# Patient Record
Sex: FEMALE | Race: BLACK OR AFRICAN AMERICAN | Employment: OTHER | URBAN - METROPOLITAN AREA
[De-identification: names, ages, dates, MRNs, and addresses within clinical notes are randomized per-mention and may not be internally consistent; named-entity substitution may affect disease eponyms.]

---

## 2020-07-22 ENCOUNTER — HOSPITAL ENCOUNTER (INPATIENT)
Age: 65
LOS: 2 days | Discharge: HOME | DRG: 066 | End: 2020-07-24
Attending: EMERGENCY MEDICINE | Admitting: HOSPITALIST
Payer: MEDICARE

## 2020-07-22 ENCOUNTER — APPOINTMENT (OUTPATIENT)
Dept: CT IMAGING | Age: 65
DRG: 066 | End: 2020-07-22
Attending: EMERGENCY MEDICINE
Payer: MEDICARE

## 2020-07-22 ENCOUNTER — APPOINTMENT (OUTPATIENT)
Dept: MRI IMAGING | Age: 65
DRG: 066 | End: 2020-07-22
Attending: EMERGENCY MEDICINE
Payer: MEDICARE

## 2020-07-22 DIAGNOSIS — E78.2 MIXED HYPERLIPIDEMIA: ICD-10-CM

## 2020-07-22 DIAGNOSIS — M54.30 SCIATICA: ICD-10-CM

## 2020-07-22 DIAGNOSIS — E11.9 TYPE 2 DIABETES MELLITUS WITHOUT COMPLICATION, WITHOUT LONG-TERM CURRENT USE OF INSULIN (HCC): ICD-10-CM

## 2020-07-22 DIAGNOSIS — M54.2 NECK PAIN: Primary | ICD-10-CM

## 2020-07-22 DIAGNOSIS — I63.511 CEREBROVASCULAR ACCIDENT (CVA) DUE TO STENOSIS OF RIGHT MIDDLE CEREBRAL ARTERY (HCC): ICD-10-CM

## 2020-07-22 DIAGNOSIS — I63.9 CEREBROVASCULAR ACCIDENT (CVA), UNSPECIFIED MECHANISM (HCC): ICD-10-CM

## 2020-07-22 DIAGNOSIS — I10 ESSENTIAL HYPERTENSION, BENIGN: ICD-10-CM

## 2020-07-22 DIAGNOSIS — R20.2 PARESTHESIAS IN LEFT HAND: ICD-10-CM

## 2020-07-22 LAB
ALBUMIN SERPL-MCNC: 3.5 G/DL (ref 3.5–5)
ALBUMIN/GLOB SERPL: 0.7 {RATIO} (ref 1.1–2.2)
ALP SERPL-CCNC: 84 U/L (ref 45–117)
ALT SERPL-CCNC: 14 U/L (ref 12–78)
ANION GAP SERPL CALC-SCNC: 6 MMOL/L (ref 5–15)
AST SERPL-CCNC: 9 U/L (ref 15–37)
BASOPHILS # BLD: 0.1 K/UL (ref 0–0.1)
BASOPHILS NFR BLD: 1 % (ref 0–1)
BILIRUB SERPL-MCNC: 0.5 MG/DL (ref 0.2–1)
BUN SERPL-MCNC: 14 MG/DL (ref 6–20)
BUN/CREAT SERPL: 16 (ref 12–20)
CALCIUM SERPL-MCNC: 9.5 MG/DL (ref 8.5–10.1)
CHLORIDE SERPL-SCNC: 100 MMOL/L (ref 97–108)
CO2 SERPL-SCNC: 30 MMOL/L (ref 21–32)
CREAT SERPL-MCNC: 0.9 MG/DL (ref 0.55–1.02)
DIFFERENTIAL METHOD BLD: NORMAL
EOSINOPHIL # BLD: 0 K/UL (ref 0–0.4)
EOSINOPHIL NFR BLD: 0 % (ref 0–7)
ERYTHROCYTE [DISTWIDTH] IN BLOOD BY AUTOMATED COUNT: 13 % (ref 11.5–14.5)
GLOBULIN SER CALC-MCNC: 4.8 G/DL (ref 2–4)
GLUCOSE BLD STRIP.AUTO-MCNC: 115 MG/DL (ref 65–100)
GLUCOSE BLD STRIP.AUTO-MCNC: 178 MG/DL (ref 65–100)
GLUCOSE SERPL-MCNC: 111 MG/DL (ref 65–100)
HCT VFR BLD AUTO: 42.7 % (ref 35–47)
HGB BLD-MCNC: 14 G/DL (ref 11.5–16)
IMM GRANULOCYTES # BLD AUTO: 0 K/UL (ref 0–0.04)
IMM GRANULOCYTES NFR BLD AUTO: 0 % (ref 0–0.5)
INR PPP: 1 (ref 0.9–1.1)
LYMPHOCYTES # BLD: 2.7 K/UL (ref 0.8–3.5)
LYMPHOCYTES NFR BLD: 29 % (ref 12–49)
MCH RBC QN AUTO: 29.7 PG (ref 26–34)
MCHC RBC AUTO-ENTMCNC: 32.8 G/DL (ref 30–36.5)
MCV RBC AUTO: 90.5 FL (ref 80–99)
MONOCYTES # BLD: 0.9 K/UL (ref 0–1)
MONOCYTES NFR BLD: 10 % (ref 5–13)
NEUTS SEG # BLD: 5.7 K/UL (ref 1.8–8)
NEUTS SEG NFR BLD: 60 % (ref 32–75)
NRBC # BLD: 0 K/UL (ref 0–0.01)
NRBC BLD-RTO: 0 PER 100 WBC
PLATELET # BLD AUTO: 384 K/UL (ref 150–400)
PMV BLD AUTO: 10 FL (ref 8.9–12.9)
POTASSIUM SERPL-SCNC: 3.4 MMOL/L (ref 3.5–5.1)
PROT SERPL-MCNC: 8.3 G/DL (ref 6.4–8.2)
PROTHROMBIN TIME: 10.9 SEC (ref 9–11.1)
RBC # BLD AUTO: 4.72 M/UL (ref 3.8–5.2)
SERVICE CMNT-IMP: ABNORMAL
SERVICE CMNT-IMP: ABNORMAL
SODIUM SERPL-SCNC: 136 MMOL/L (ref 136–145)
TROPONIN I SERPL-MCNC: <0.05 NG/ML
WBC # BLD AUTO: 9.5 K/UL (ref 3.6–11)

## 2020-07-22 PROCEDURE — 74011250636 HC RX REV CODE- 250/636: Performed by: INTERNAL MEDICINE

## 2020-07-22 PROCEDURE — 96374 THER/PROPH/DIAG INJ IV PUSH: CPT

## 2020-07-22 PROCEDURE — 85025 COMPLETE CBC W/AUTO DIFF WBC: CPT

## 2020-07-22 PROCEDURE — 74011250636 HC RX REV CODE- 250/636: Performed by: EMERGENCY MEDICINE

## 2020-07-22 PROCEDURE — 36415 COLL VENOUS BLD VENIPUNCTURE: CPT

## 2020-07-22 PROCEDURE — 74011636320 HC RX REV CODE- 636/320: Performed by: EMERGENCY MEDICINE

## 2020-07-22 PROCEDURE — 99285 EMERGENCY DEPT VISIT HI MDM: CPT

## 2020-07-22 PROCEDURE — 84484 ASSAY OF TROPONIN QUANT: CPT

## 2020-07-22 PROCEDURE — 82962 GLUCOSE BLOOD TEST: CPT

## 2020-07-22 PROCEDURE — 93005 ELECTROCARDIOGRAM TRACING: CPT

## 2020-07-22 PROCEDURE — 80053 COMPREHEN METABOLIC PANEL: CPT

## 2020-07-22 PROCEDURE — 70496 CT ANGIOGRAPHY HEAD: CPT

## 2020-07-22 PROCEDURE — 74011250637 HC RX REV CODE- 250/637: Performed by: INTERNAL MEDICINE

## 2020-07-22 PROCEDURE — 74011250637 HC RX REV CODE- 250/637: Performed by: EMERGENCY MEDICINE

## 2020-07-22 PROCEDURE — 70551 MRI BRAIN STEM W/O DYE: CPT

## 2020-07-22 PROCEDURE — 85610 PROTHROMBIN TIME: CPT

## 2020-07-22 PROCEDURE — 70450 CT HEAD/BRAIN W/O DYE: CPT

## 2020-07-22 PROCEDURE — 65660000000 HC RM CCU STEPDOWN

## 2020-07-22 RX ORDER — ACETAMINOPHEN 325 MG/1
650 TABLET ORAL
Status: DISCONTINUED | OUTPATIENT
Start: 2020-07-22 | End: 2020-07-23

## 2020-07-22 RX ORDER — CYCLOBENZAPRINE HCL 10 MG
10 TABLET ORAL
Qty: 30 TAB | Refills: 0 | Status: SHIPPED | OUTPATIENT
Start: 2020-07-22 | End: 2020-07-22

## 2020-07-22 RX ORDER — SODIUM CHLORIDE 0.9 % (FLUSH) 0.9 %
10 SYRINGE (ML) INJECTION
Status: COMPLETED | OUTPATIENT
Start: 2020-07-22 | End: 2020-07-22

## 2020-07-22 RX ORDER — ACETAMINOPHEN 650 MG/1
650 SUPPOSITORY RECTAL
Status: DISCONTINUED | OUTPATIENT
Start: 2020-07-22 | End: 2020-07-24 | Stop reason: HOSPADM

## 2020-07-22 RX ORDER — ENOXAPARIN SODIUM 100 MG/ML
40 INJECTION SUBCUTANEOUS EVERY 24 HOURS
Status: DISCONTINUED | OUTPATIENT
Start: 2020-07-22 | End: 2020-07-24 | Stop reason: HOSPADM

## 2020-07-22 RX ORDER — MAGNESIUM SULFATE 100 %
4 CRYSTALS MISCELLANEOUS AS NEEDED
Status: DISCONTINUED | OUTPATIENT
Start: 2020-07-22 | End: 2020-07-24 | Stop reason: HOSPADM

## 2020-07-22 RX ORDER — ONDANSETRON 2 MG/ML
4 INJECTION INTRAMUSCULAR; INTRAVENOUS
Status: DISCONTINUED | OUTPATIENT
Start: 2020-07-22 | End: 2020-07-24 | Stop reason: HOSPADM

## 2020-07-22 RX ORDER — ACETAMINOPHEN 500 MG
1000 TABLET ORAL ONCE
Status: COMPLETED | OUTPATIENT
Start: 2020-07-22 | End: 2020-07-22

## 2020-07-22 RX ORDER — DIAZEPAM 10 MG/2ML
1 INJECTION INTRAMUSCULAR
Status: COMPLETED | OUTPATIENT
Start: 2020-07-22 | End: 2020-07-22

## 2020-07-22 RX ORDER — AMLODIPINE BESYLATE 5 MG/1
10 TABLET ORAL DAILY
Status: DISCONTINUED | OUTPATIENT
Start: 2020-07-23 | End: 2020-07-24 | Stop reason: HOSPADM

## 2020-07-22 RX ORDER — LOSARTAN POTASSIUM 100 MG/1
100 TABLET ORAL DAILY
COMMUNITY

## 2020-07-22 RX ORDER — HYDRALAZINE HYDROCHLORIDE 20 MG/ML
10 INJECTION INTRAMUSCULAR; INTRAVENOUS
Status: DISCONTINUED | OUTPATIENT
Start: 2020-07-22 | End: 2020-07-24 | Stop reason: HOSPADM

## 2020-07-22 RX ORDER — ALPRAZOLAM 0.5 MG/1
1 TABLET ORAL
Status: DISCONTINUED | OUTPATIENT
Start: 2020-07-22 | End: 2020-07-24 | Stop reason: HOSPADM

## 2020-07-22 RX ORDER — LOSARTAN POTASSIUM 50 MG/1
100 TABLET ORAL DAILY
Status: DISCONTINUED | OUTPATIENT
Start: 2020-07-23 | End: 2020-07-22

## 2020-07-22 RX ORDER — ASPIRIN 325 MG
325 TABLET ORAL ONCE
Status: COMPLETED | OUTPATIENT
Start: 2020-07-22 | End: 2020-07-22

## 2020-07-22 RX ORDER — LOSARTAN POTASSIUM 50 MG/1
100 TABLET ORAL DAILY
Status: DISCONTINUED | OUTPATIENT
Start: 2020-07-22 | End: 2020-07-24 | Stop reason: HOSPADM

## 2020-07-22 RX ORDER — GUAIFENESIN 100 MG/5ML
81 LIQUID (ML) ORAL DAILY
Status: DISCONTINUED | OUTPATIENT
Start: 2020-07-23 | End: 2020-07-24 | Stop reason: HOSPADM

## 2020-07-22 RX ORDER — ACETAMINOPHEN 325 MG/1
650 TABLET ORAL
Status: DISCONTINUED | OUTPATIENT
Start: 2020-07-23 | End: 2020-07-24 | Stop reason: HOSPADM

## 2020-07-22 RX ORDER — BACLOFEN 10 MG/1
5 TABLET ORAL 2 TIMES DAILY
COMMUNITY

## 2020-07-22 RX ORDER — ACETAMINOPHEN 325 MG/1
650 TABLET ORAL
Status: DISCONTINUED | OUTPATIENT
Start: 2020-07-22 | End: 2020-07-24 | Stop reason: HOSPADM

## 2020-07-22 RX ORDER — ATORVASTATIN CALCIUM 40 MG/1
40 TABLET, FILM COATED ORAL
Status: DISCONTINUED | OUTPATIENT
Start: 2020-07-22 | End: 2020-07-24 | Stop reason: HOSPADM

## 2020-07-22 RX ORDER — DEXTROSE 50 % IN WATER (D50W) INTRAVENOUS SYRINGE
12.5-25 AS NEEDED
Status: DISCONTINUED | OUTPATIENT
Start: 2020-07-22 | End: 2020-07-24 | Stop reason: HOSPADM

## 2020-07-22 RX ORDER — POLYETHYLENE GLYCOL 3350 17 G/17G
17 POWDER, FOR SOLUTION ORAL DAILY
Status: DISCONTINUED | OUTPATIENT
Start: 2020-07-23 | End: 2020-07-24 | Stop reason: HOSPADM

## 2020-07-22 RX ORDER — CYCLOBENZAPRINE HCL 10 MG
5 TABLET ORAL
Status: DISCONTINUED | OUTPATIENT
Start: 2020-07-22 | End: 2020-07-24 | Stop reason: HOSPADM

## 2020-07-22 RX ORDER — INSULIN LISPRO 100 [IU]/ML
INJECTION, SOLUTION INTRAVENOUS; SUBCUTANEOUS
Status: DISCONTINUED | OUTPATIENT
Start: 2020-07-22 | End: 2020-07-24 | Stop reason: HOSPADM

## 2020-07-22 RX ORDER — METFORMIN HYDROCHLORIDE 1000 MG/1
1000 TABLET ORAL 2 TIMES DAILY WITH MEALS
COMMUNITY

## 2020-07-22 RX ADMIN — ENOXAPARIN SODIUM 40 MG: 40 INJECTION SUBCUTANEOUS at 21:41

## 2020-07-22 RX ADMIN — ASPIRIN 325 MG: 325 TABLET, FILM COATED ORAL at 14:27

## 2020-07-22 RX ADMIN — ATORVASTATIN CALCIUM 40 MG: 40 TABLET, FILM COATED ORAL at 21:41

## 2020-07-22 RX ADMIN — ACETAMINOPHEN 650 MG: 325 TABLET ORAL at 17:52

## 2020-07-22 RX ADMIN — Medication 10 ML: at 11:20

## 2020-07-22 RX ADMIN — IOPAMIDOL 100 ML: 755 INJECTION, SOLUTION INTRAVENOUS at 11:20

## 2020-07-22 RX ADMIN — ONDANSETRON 4 MG: 2 INJECTION INTRAMUSCULAR; INTRAVENOUS at 17:21

## 2020-07-22 RX ADMIN — ACETAMINOPHEN 1000 MG: 500 TABLET ORAL at 12:03

## 2020-07-22 RX ADMIN — LOSARTAN POTASSIUM 100 MG: 50 TABLET ORAL at 21:41

## 2020-07-22 RX ADMIN — ALPRAZOLAM 1 MG: 0.5 TABLET ORAL at 21:41

## 2020-07-22 RX ADMIN — DIAZEPAM 1 MG: 5 INJECTION, SOLUTION INTRAMUSCULAR; INTRAVENOUS at 12:04

## 2020-07-22 NOTE — ACP (ADVANCE CARE PLANNING)
Advance Care Planning Note    Name: Johan Walter  YOB: 1955  MRN: [id number]  Admission Date: 7/22/2020 10:23 AM    Date of discussion: 7/22/2020    Active Diagnoses:    Hospital Problems  Date Reviewed: 9/22/2015          Codes Class Noted POA    Stroke (cerebrum) [facility]) ICD-10-CM: I63.9  ICD-9-CM: 434.91  7/22/2020 Unknown                These active diagnoses are of sufficient risk that focused discussion on advance care planning is indicated in order to allow the patient to thoughtfully consider personal goals of care, and if situations arise that prevent the ability to personally give input, to ensure appropriate representation of their personal desires for different levels and aggressiveness of care. Persons present and participating in discussion: Patient Nu Bloom who is AAOx4 and her daughter Thaddeus Smith     Discussion: Code status addressed due to above mentioned medical problems and also her chronic comorbidity /advance age. Patient and MPOA wants her  to be a Full Code. Patient  would like to assign her daughter Thaddeus Smith as the surrogate decision maker. Time Spent:   Total time spent face-to-face in education and discussion:16 minutes.      Kristie Lyon MD  7/22/2020  3:52 PM

## 2020-07-22 NOTE — ED NOTES
Pt in from home with family. Pt reports left hand pain/numbness, headache/earache and neck pain and stiffness since Sunday. Pt states she feels like her left hand is asleep and tingling. Pt states her speech became slurred for several minutes on Sunday and her PCP told her to make an appointment for an ultrasound. Pt reports 6/10 pain at this time and is monitored x2.

## 2020-07-22 NOTE — ED PROVIDER NOTES
EMERGENCY DEPARTMENT HISTORY AND PHYSICAL EXAM          Date: 7/22/2020  Patient Name: Vivek Bentley    History of Presenting Illness     Chief Complaint   Patient presents with    Neck Pain     Ambulatory into the ED with c/o nausea, non-traumatic neck pain, and Lt hand numbness (denies numbness in her arm).  Hand Pain    Nausea       History Provided By: Patient    HPI: Vivek Bentley is a 71 y.o. female, pmhx high cholesterol, DM, fibromyalgia, depression, arthritis and HTN, who presents ambulatory with family to the ED c/o neck pain     Patient notes she started last night with right sided neck pain radiating up into her ear and numbness to her left hand with has progressively worsened through the day. Currently her pain is rated at a 5-6/10; she has taken her routine meds today but nothing extra for the pain. She also notes that she feels nauseated with the pain. Patient specifically denies any recent fevers, chills, vomiting, diarrhea, abd pain, CP, SOB, urinary sxs, changes in BM, or headache. PCP: Ozzie Espinoza MD    Allergies: Percocet, dilaudid and codeine cause nausea  Social Hx: occasional tobacco, -vaping, +EtOH, -Illicit Drugs; Lives with children    There are no other complaints, changes, or physical findings at this time. Current Facility-Administered Medications   Medication Dose Route Frequency Provider Last Rate Last Dose    aspirin tablet 325 mg  325 mg Oral ONCE JuditheeJazmin jones MD         Current Outpatient Medications   Medication Sig Dispense Refill    cyclobenzaprine (FLEXERIL) 10 mg tablet Take 1 Tab by mouth three (3) times daily as needed for Muscle Spasm(s). 30 Tab 0    escitalopram oxalate (LEXAPRO) 10 mg tablet Take 1 Tab by mouth daily. Indications: ANXIETY WITH DEPRESSION 90 Tab 0    ALPRAZolam (XANAX) 1 mg tablet Take 1 Tab by mouth two (2) times daily as needed for Anxiety. Max Daily Amount: 2 mg.  Indications: ANXIETY WITH DEPRESSION 30 Tab 0  amLODIPine (NORVASC) 10 mg tablet Take 1 Tab by mouth daily. 90 Tab 1    atorvastatin (LIPITOR) 10 mg tablet TAKE ONE TABLET BY MOUTH ONCE DAILY 90 Tab 1    Nebulizer Accessories kit Use as directed. 1 Kit 0    glimepiride (AMARYL) 4 mg tablet TAKE ONE TABLET BY MOUTH IN THE MORNING 90 Tab 3    ondansetron (ZOFRAN ODT) 4 mg disintegrating tablet Take 1 Tab by mouth every eight (8) hours as needed for Nausea. 16 Tab 1    metoprolol succinate (TOPROL-XL) 25 mg XL tablet Take 1 Tab by mouth daily. 30 Tab 5    glucose blood VI test strips (ASCENSIA AUTODISC VI, ONE TOUCH ULTRA TEST VI) strip Test blood sugar three time daily. 90 Each 2    acetaminophen (TYLENOL EXTRA STRENGTH) 500 mg tablet Take  by mouth every six (6) hours as needed for Pain.  fluticasone-salmeterol (ADVAIR) 100-50 mcg/dose diskus inhaler Take 1 Puff by inhalation two (2) times a day. 1 Inhaler 2    fluticasone (FLONASE) 50 mcg/actuation nasal spray 2 Sprays by Both Nostrils route daily. 1 Bottle 2    lisinopril-hydrochlorothiazide (PRINZIDE, ZESTORETIC) 20-12.5 mg per tablet Take 2 Tabs by mouth daily. 180 Tab 1    aspirin 81 mg chewable tablet Take 81 mg by mouth daily.  albuterol (PROAIR HFA) 90 mcg/actuation inhaler Take 1 puff by inhalation every four (4) hours as needed for Wheezing or Shortness of Breath. 1 Inhaler 2    loratadine (CLARITIN) 10 mg tablet Take 10 mg by mouth daily.  celecoxib (CELEBREX) 200 mg capsule Take 1 capsule by mouth two (2) times daily as needed for Pain. 180 capsule 1    cholecalciferol, vitamin D3, (VITAMIN D3) 2,000 unit tab Take  by mouth daily.  0.9 % SODIUM CHLORIDE (NASAL MIST NA) by Nasal route.  albuterol (PROVENTIL VENTOLIN) 2.5 mg /3 mL (0.083 %) nebulizer solution 3 mL by Nebulization route every four (4) hours as needed for Wheezing. 1 Package 4    epinephrine (EPIPEN) 0.3 mg/0.3 mL (1:1,000) injection 0.3 mL by IntraMUSCular route once as needed.  1 each 1    zolpidem CR (AMBIEN CR) 12.5 mg tablet Take 1 tablet by mouth nightly as needed. 90 tablet 1       Past History     Past Medical History:  Past Medical History:   Diagnosis Date    Arthritis     Breast cyst     Depressive disorder, not elsewhere classified 6/17/2013    Fibromyalgia 6/17/2013    Hypertension     Menopause     Mixed hyperlipidemia 6/17/2013    Type II or unspecified type diabetes mellitus without mention of complication, not stated as uncontrolled 6/17/2013    Unspecified vitamin D deficiency 6/17/2013       Past Surgical History:  Past Surgical History:   Procedure Laterality Date    ABDOMEN SURGERY PROC UNLISTED  6/2013    Colon resection    HX BREAST BIOPSY Left     cyst removal    HX CHOLECYSTECTOMY      HX COLONOSCOPY  7/28/14    HX HYSTERECTOMY      HX MOHS PROCEDURES      HX OOPHORECTOMY      HX OTHER SURGICAL      cyst removed from spine       Family History:  Family History   Problem Relation Age of Onset    Diabetes Mother     Hypertension Mother     Kidney Disease Mother     Diabetes Father     Heart Disease Father     Hypertension Sister     Stroke Sister     Heart Disease Sister     Hypertension Sister     Hypertension Brother     Hypertension Sister        Social History:  Social History     Tobacco Use    Smoking status: Former Smoker    Tobacco comment: quit 2010   Substance Use Topics    Alcohol use: No    Drug use: Not on file       Allergies: Allergies   Allergen Reactions    Bees [Hymenoptera Allergenic Extract] Nausea Only    Menthol Chest Rub [Camph-Eucalypt-Men-Turp-Pet] Rash    Percocet [Oxycodone-Acetaminophen] Unknown (comments)         Review of Systems   Review of Systems   Constitutional: Negative for activity change, appetite change, chills, fever and unexpected weight change. HENT: Negative for congestion. Eyes: Negative for pain and visual disturbance. Respiratory: Negative for cough and shortness of breath.     Cardiovascular: Negative for chest pain. Gastrointestinal: Negative for abdominal pain, diarrhea, nausea and vomiting. Genitourinary: Negative for dysuria. Musculoskeletal: Positive for gait problem (walks with a cane at baseline), neck pain and neck stiffness. Negative for back pain. Skin: Negative for rash. Neurological: Positive for numbness. Negative for headaches. Physical Exam   Physical Exam  Vitals signs and nursing note reviewed. Constitutional:       Appearance: She is well-developed. She is not diaphoretic. Comments: Depressed appearing female with flattened voice currently with elevated blood pressure in mild-mod distress due to pain. HENT:      Head: Normocephalic and atraumatic. Eyes:      General:         Right eye: No discharge. Left eye: No discharge. Conjunctiva/sclera: Conjunctivae normal.      Pupils: Pupils are equal, round, and reactive to light. Neck:      Musculoskeletal: Normal range of motion and neck supple. Cardiovascular:      Rate and Rhythm: Normal rate and regular rhythm. Heart sounds: Normal heart sounds. No murmur. Pulmonary:      Effort: Pulmonary effort is normal. No respiratory distress. Breath sounds: Normal breath sounds. No stridor. No wheezing, rhonchi or rales. Abdominal:      General: Bowel sounds are normal. There is no distension. Palpations: Abdomen is soft. Tenderness: There is no abdominal tenderness. There is no guarding or rebound. Musculoskeletal: Normal range of motion. Right lower leg: No edema. Left lower leg: No edema. Skin:     General: Skin is warm and dry. Findings: No rash. Neurological:      Mental Status: She is alert and oriented to person, place, and time. Cranial Nerves: No cranial nerve deficit. Motor: No weakness or abnormal muscle tone. Comments: Normal finger-to-nose; negative pronator drift.        Diagnostic Study Results     Labs -     Recent Results (from the past 12 hour(s))   GLUCOSE, POC    Collection Time: 07/22/20 10:58 AM   Result Value Ref Range    Glucose (POC) 115 (H) 65 - 100 mg/dL    Performed by Reid Smith    CBC WITH AUTOMATED DIFF    Collection Time: 07/22/20 10:59 AM   Result Value Ref Range    WBC 9.5 3.6 - 11.0 K/uL    RBC 4.72 3.80 - 5.20 M/uL    HGB 14.0 11.5 - 16.0 g/dL    HCT 42.7 35.0 - 47.0 %    MCV 90.5 80.0 - 99.0 FL    MCH 29.7 26.0 - 34.0 PG    MCHC 32.8 30.0 - 36.5 g/dL    RDW 13.0 11.5 - 14.5 %    PLATELET 723 473 - 093 K/uL    MPV 10.0 8.9 - 12.9 FL    NRBC 0.0 0  WBC    ABSOLUTE NRBC 0.00 0.00 - 0.01 K/uL    NEUTROPHILS 60 32 - 75 %    LYMPHOCYTES 29 12 - 49 %    MONOCYTES 10 5 - 13 %    EOSINOPHILS 0 0 - 7 %    BASOPHILS 1 0 - 1 %    IMMATURE GRANULOCYTES 0 0.0 - 0.5 %    ABS. NEUTROPHILS 5.7 1.8 - 8.0 K/UL    ABS. LYMPHOCYTES 2.7 0.8 - 3.5 K/UL    ABS. MONOCYTES 0.9 0.0 - 1.0 K/UL    ABS. EOSINOPHILS 0.0 0.0 - 0.4 K/UL    ABS. BASOPHILS 0.1 0.0 - 0.1 K/UL    ABS. IMM. GRANS. 0.0 0.00 - 0.04 K/UL    DF AUTOMATED     METABOLIC PANEL, COMPREHENSIVE    Collection Time: 07/22/20 10:59 AM   Result Value Ref Range    Sodium 136 136 - 145 mmol/L    Potassium 3.4 (L) 3.5 - 5.1 mmol/L    Chloride 100 97 - 108 mmol/L    CO2 30 21 - 32 mmol/L    Anion gap 6 5 - 15 mmol/L    Glucose 111 (H) 65 - 100 mg/dL    BUN 14 6 - 20 MG/DL    Creatinine 0.90 0.55 - 1.02 MG/DL    BUN/Creatinine ratio 16 12 - 20      GFR est AA >60 >60 ml/min/1.73m2    GFR est non-AA >60 >60 ml/min/1.73m2    Calcium 9.5 8.5 - 10.1 MG/DL    Bilirubin, total 0.5 0.2 - 1.0 MG/DL    ALT (SGPT) 14 12 - 78 U/L    AST (SGOT) 9 (L) 15 - 37 U/L    Alk.  phosphatase 84 45 - 117 U/L    Protein, total 8.3 (H) 6.4 - 8.2 g/dL    Albumin 3.5 3.5 - 5.0 g/dL    Globulin 4.8 (H) 2.0 - 4.0 g/dL    A-G Ratio 0.7 (L) 1.1 - 2.2     PROTHROMBIN TIME + INR    Collection Time: 07/22/20 10:59 AM   Result Value Ref Range    INR 1.0 0.9 - 1.1      Prothrombin time 10.9 9.0 - 11.1 sec TROPONIN I    Collection Time: 07/22/20 10:59 AM   Result Value Ref Range    Troponin-I, Qt. <0.05 <0.05 ng/mL   EKG, 12 LEAD, INITIAL    Collection Time: 07/22/20 11:05 AM   Result Value Ref Range    Ventricular Rate 78 BPM    Atrial Rate 78 BPM    P-R Interval 166 ms    QRS Duration 78 ms    Q-T Interval 386 ms    QTC Calculation (Bezet) 440 ms    Calculated P Axis 40 degrees    Calculated R Axis -3 degrees    Calculated T Axis 16 degrees    Diagnosis       Normal sinus rhythm  Possible Left atrial enlargement  Left ventricular hypertrophy  No previous ECGs available         Radiologic Studies -   MRI BRAIN WO CONT   Final Result   IMPRESSION:       1. Multiple small acute infarcts in the right frontoparietal white matter. 2. Moderate chronic microvascular ischemic disease. Numerous chronic   microhemorrhages in the bilateral thalami, and few additional scattered   supratentorial and infratentorial chronic microhemorrhages as above. 23X            CT HEAD WO CONT   Final Result   IMPRESSION:   1. No evidence of acute intracranial abnormality. 2. Moderate chronic microvascular ischemic disease. CTA HEAD NECK W CONT   Final Result   IMPRESSION:       CTA Head:   1. No evidence of flow-limiting stenosis. 2. Multifocal atherosclerotic disease as above, including severe stenosis of the   left P2 segment, moderate stenosis of the distal right M1 segment and moderate   stenosis of the bilateral M2 segments. 3. A 2 mm right posterior communicating artery infundibulum versus aneurysm. CTA Neck:   1. No evidence of flow-limiting stenosis. 2. Mild stenosis (less than 50% by NASCET criteria) of the proximal left   internal carotid artery. CT Results  (Last 48 hours)               07/22/20 1116  CT HEAD WO CONT Final result    Impression:  IMPRESSION:   1. No evidence of acute intracranial abnormality. 2. Moderate chronic microvascular ischemic disease.                Narrative: EXAM:  CT HEAD WO CONT       INDICATION:   left arm paresthesia with weakness since Sunday       COMPARISON: None. TECHNIQUE: Unenhanced CT of the head was performed using 5 mm images. Brain and   bone windows were generated. CT dose reduction was achieved through use of a   standardized protocol tailored for this examination and automatic exposure   control for dose modulation. FINDINGS:   The ventricles are normal in size and position. Scattered periventricular and   deep white matter hypodensities, confluent in regions, consistent with moderate   chronic microangiopathic ischemic changes. Basilar cisterns are patent. No   midline shift. There is no evidence of acute infarct, hemorrhage, or extraaxial   fluid collection. Mild mucosal thickening in the bilateral ethmoidal air cells. The mastoid air   cells and middle ears are clear. The orbital contents are within normal limits. There are no significant osseous or extracranial soft tissue lesions. 07/22/20 1116  CTA HEAD NECK W CONT Final result    Impression:  IMPRESSION:        CTA Head:   1. No evidence of flow-limiting stenosis. 2. Multifocal atherosclerotic disease as above, including severe stenosis of the   left P2 segment, moderate stenosis of the distal right M1 segment and moderate   stenosis of the bilateral M2 segments. 3. A 2 mm right posterior communicating artery infundibulum versus aneurysm. CTA Neck:   1. No evidence of flow-limiting stenosis. 2. Mild stenosis (less than 50% by NASCET criteria) of the proximal left   internal carotid artery. Narrative:  EXAM:  CTA HEAD NECK W CONT       INDICATION:   rt neck pain with left arm paresthesias       COMPARISON:  CT head 7/22/2020. CONTRAST:  100 mL of Isovue-370. TECHNIQUE:  Unenhanced  images were obtained to localize the volume for   acquisition.   Multislice helical axial CT angiography was performed from the   aortic arch to the top of the head during uneventful rapid bolus intravenous   contrast administration. Coronal and sagittal reformations and 3D post   processing was performed. CT dose reduction was achieved through use of a   standardized protocol tailored for this examination and automatic exposure   control for dose modulation. FINDINGS:       CTA Head:   There is no evidence of large vessel occlusion or flow-limiting stenosis of the   intracranial internal carotid, anterior cerebral, and middle cerebral arteries. Calcific atherosclerosis of the bilateral carotid siphons with mild multifocal   stenosis on the right. Moderate stenosis of the distal right M1 segment and   moderate stenosis of the proximal right M2 segments. Moderate stenosis of the   proximal left M2 posterior division. The anterior communicating artery is   patent. There is no evidence of large vessel occlusion or flow-limiting stenosis of the   intracranial vertebral arteries, basilar artery, or posterior cerebral arteries. There is moderate stenosis of the distal right P2 segment. There is severe   stenosis in the mid left P2 segment. Mild stenosis of the proximal basilar   artery. The left posterior communicating artery is patent with an infundibulum   at its origin. There is a small 2 mm infundibulum versus aneurysm of the right posterior   communicating artery. The dural venous sinuses and deep cerebral venous system   are patent. No evidence of abnormal enhancement on delayed phase images. CTA NECK:   NASCET method was utilized for calculating stenosis. The aortic arch is unremarkable. The common carotid arteries demonstrate no   significant stenosis. Mild calcific atherosclerosis of the right carotid   bifurcation without significant stenosis. Eccentric calcified plaque in the   proximal left internal carotid artery with mild (less than 50%) stenosis.    Retropharyngeal course of the proximal bilateral internal carotid arteries. There is a codominant vertebrobasilar arterial system. The cervical vertebral   arteries are normal in course, size and contour without significant stenosis. Visualized soft tissues of the neck are unremarkable. Visualized lung apices are   clear. No acute fracture or aggressive osseous lesion. Mild degenerative disc   disease at C3-C4. CXR Results  (Last 48 hours)    None            Medical Decision Making   I am the first provider for this patient. I reviewed the vital signs, available nursing notes, past medical history, past surgical history, family history and social history. Vital Signs-Reviewed the patient's vital signs. Patient Vitals for the past 12 hrs:   Temp Pulse Resp BP SpO2   07/22/20 1230    153/86 99 %   07/22/20 1100    (!) 156/97 100 %   07/22/20 1022 98.2 °F (36.8 °C) (!) 103 17 (!) 159/108 100 %       Pulse Oximetry Analysis - 99% on RA    Cardiac Monitor:   Rate: 95bpm  Rhythm: Normal Sinus Rhythm      Records Reviewed: Nursing Notes, Old Medical Records, Previous Radiology Studies and Previous Laboratory Studies    Provider Notes (Medical Decision Making):   MDM: Middle-aged female presenting with right neck tension and left hand paresthesias. Patient states symptoms started yesterday evening but according to the family she has been complaining of pain since Sunday. Neurologic exam without any acute significant deficit, vascular exam without bruit. Will initiate nonacute evaluation given the time. .    ED Course:   Initial assessment performed. The patients presenting problems have been discussed, and they are in agreement with the care plan formulated and outlined with them. I have encouraged them to ask questions as they arise throughout their visit.     EKG interpretation: (Preliminary)  Rhythm: Normal sinus rhythm at a regular rate of 78 bpm; normal NJ; normal QRS; normal QTC; left axis deviation; T wave flattening noted in lead III.  This EKG was interpreted by ED Provider Marisol Rosario MD    11:30 AM   Spent 3 minutes discussing the risks of smoking and the benefits of smoking cessation as well as the long term sequelae of smoking with the pt who verbalized her understanding. Reviewed strategies for success, including gradually decreasing the number of cigarettes smoked a day. PROGRESS NOTE:    ED Course as of Jul 22 1423 Wed Jul 22, 2020   1259 Patient states she is feeling better. Had a long discussion with patient and her daughter regarding her CT and CT angios results. Recommend MRI of the brain to which patient and family agree. [JT]      ED Course User Index  [JT] Esthela Murray MD      2:23 PM   Patient sleeping but awakens easily. Her blood pressure remained stable. MRI shows multiple acute infarcts. Call placed through the hospitalist pager for admission. CONSULT NOTE:   2:34 PM   Marisol Rosario MD spoke with Dr. Noreen Perez,   Specialty: Hospitalist  Discussed pt's hx, disposition, and available diagnostic and imaging results. Reviewed care plans. Consultant agrees with plans as outlined. She will evaluate the patient for admission. Critical Care Time:   0      Diagnosis     Clinical Impression:   1. Neck pain    2. Paresthesias in left hand    3. Sciatica    4. Cerebrovascular accident (CVA), unspecified mechanism ([address]        PLAN:  1. Admission to the hospital for further treatment and evaluation      Please note, this dictation was completed with [employer], the computer voice recognition software. Quite often unanticipated grammatical, syntax, homophones, and other interpretive errors are inadvertently transcribed by the computer software. Please disregard these errors. Please excuse any errors that have escaped final proof reading.

## 2020-07-22 NOTE — PROGRESS NOTES

## 2020-07-23 ENCOUNTER — APPOINTMENT (OUTPATIENT)
Dept: NON INVASIVE DIAGNOSTICS | Age: 65
DRG: 066 | End: 2020-07-23
Attending: INTERNAL MEDICINE
Payer: MEDICARE

## 2020-07-23 LAB
ANION GAP SERPL CALC-SCNC: 7 MMOL/L (ref 5–15)
ATRIAL RATE: 78 BPM
BUN SERPL-MCNC: 14 MG/DL (ref 6–20)
BUN/CREAT SERPL: 21 (ref 12–20)
CALCIUM SERPL-MCNC: 9.1 MG/DL (ref 8.5–10.1)
CALCULATED P AXIS, ECG09: 40 DEGREES
CALCULATED R AXIS, ECG10: -3 DEGREES
CALCULATED T AXIS, ECG11: 16 DEGREES
CHLORIDE SERPL-SCNC: 101 MMOL/L (ref 97–108)
CHOLEST SERPL-MCNC: 170 MG/DL
CO2 SERPL-SCNC: 27 MMOL/L (ref 21–32)
CREAT SERPL-MCNC: 0.66 MG/DL (ref 0.55–1.02)
DIAGNOSIS, 93000: NORMAL
ECHO AV MEAN GRADIENT: 1.87 MMHG
ECHO AV PEAK GRADIENT: 4.91 MMHG
ECHO AV PEAK GRADIENT: 4.91 MMHG
ECHO AV PEAK VELOCITY: 110.82 CM/S
ECHO AV PEAK VELOCITY: 110.82 CM/S
ECHO AV VTI: 20.37 CM
ECHO LA AREA 4C: 21.63 CM2
ECHO LA VOL 4C: 67.52 ML (ref 22–52)
ECHO LA VOLUME INDEX A4C: 40.74 ML/M2 (ref 16–28)
ECHO LV E' LATERAL VELOCITY: 6.37 CM/S
ECHO LV E' SEPTAL VELOCITY: 5.45 CM/S
ECHO LV EDV A4C: 86.67 ML
ECHO LV EDV INDEX A4C: 52.3 ML/M2
ECHO LV EJECTION FRACTION A4C: 36 PERCENT
ECHO LV ESV A4C: 55.87 ML
ECHO LV ESV INDEX A4C: 33.7 ML/M2
ECHO LVOT PEAK GRADIENT: 4.09 MMHG
ECHO LVOT PEAK GRADIENT: 4.29 MMHG
ECHO LVOT PEAK VELOCITY: 101.16 CM/S
ECHO LVOT PEAK VELOCITY: 103.6 CM/S
ECHO LVOT VTI: 17.11 CM
ECHO MV A VELOCITY: 74.11 CM/S
ECHO MV AREA PHT: 4.18 CM2
ECHO MV E DECELERATION TIME (DT): 0.18 S
ECHO MV E VELOCITY: 72.28 CM/S
ECHO MV E/A RATIO: 0.98
ECHO MV E/E' LATERAL: 11.35
ECHO MV E/E' RATIO (AVERAGED): 12.3
ECHO MV E/E' SEPTAL: 13.26
ECHO MV MAX VELOCITY: 108.63 CM/S
ECHO MV MEAN GRADIENT: 1.57 MMHG
ECHO MV PEAK GRADIENT: 4.72 MMHG
ECHO MV PRESSURE HALF TIME (PHT): 0.05 S
ECHO MV VTI: 21.26 CM
ERYTHROCYTE [DISTWIDTH] IN BLOOD BY AUTOMATED COUNT: 12.8 % (ref 11.5–14.5)
EST. AVERAGE GLUCOSE BLD GHB EST-MCNC: 154 MG/DL
GLUCOSE BLD STRIP.AUTO-MCNC: 106 MG/DL (ref 65–100)
GLUCOSE BLD STRIP.AUTO-MCNC: 131 MG/DL (ref 65–100)
GLUCOSE BLD STRIP.AUTO-MCNC: 167 MG/DL (ref 65–100)
GLUCOSE BLD STRIP.AUTO-MCNC: 172 MG/DL (ref 65–100)
GLUCOSE BLD STRIP.AUTO-MCNC: 180 MG/DL (ref 65–100)
GLUCOSE SERPL-MCNC: 107 MG/DL (ref 65–100)
HBA1C MFR BLD: 7 % (ref 4–5.6)
HCT VFR BLD AUTO: 41.2 % (ref 35–47)
HDLC SERPL-MCNC: 49 MG/DL
HDLC SERPL: 3.5 {RATIO} (ref 0–5)
HGB BLD-MCNC: 13.5 G/DL (ref 11.5–16)
LDLC SERPL CALC-MCNC: 92.2 MG/DL (ref 0–100)
LIPID PROFILE,FLP: NORMAL
LVOT MG: 1.98 MMHG
MCH RBC QN AUTO: 29.8 PG (ref 26–34)
MCHC RBC AUTO-ENTMCNC: 32.8 G/DL (ref 30–36.5)
MCV RBC AUTO: 90.9 FL (ref 80–99)
NRBC # BLD: 0 K/UL (ref 0–0.01)
NRBC BLD-RTO: 0 PER 100 WBC
P-R INTERVAL, ECG05: 166 MS
PLATELET # BLD AUTO: 371 K/UL (ref 150–400)
PMV BLD AUTO: 9.8 FL (ref 8.9–12.9)
POTASSIUM SERPL-SCNC: 3.4 MMOL/L (ref 3.5–5.1)
Q-T INTERVAL, ECG07: 386 MS
QRS DURATION, ECG06: 78 MS
QTC CALCULATION (BEZET), ECG08: 440 MS
RBC # BLD AUTO: 4.53 M/UL (ref 3.8–5.2)
SERVICE CMNT-IMP: ABNORMAL
SODIUM SERPL-SCNC: 135 MMOL/L (ref 136–145)
TRIGL SERPL-MCNC: 144 MG/DL (ref ?–150)
TSH SERPL DL<=0.05 MIU/L-ACNC: 1.69 UIU/ML (ref 0.36–3.74)
VENTRICULAR RATE, ECG03: 78 BPM
VLDLC SERPL CALC-MCNC: 28.8 MG/DL
WBC # BLD AUTO: 8 K/UL (ref 3.6–11)

## 2020-07-23 PROCEDURE — 92610 EVALUATE SWALLOWING FUNCTION: CPT

## 2020-07-23 PROCEDURE — 74011250636 HC RX REV CODE- 250/636: Performed by: INTERNAL MEDICINE

## 2020-07-23 PROCEDURE — 83036 HEMOGLOBIN GLYCOSYLATED A1C: CPT

## 2020-07-23 PROCEDURE — 74011250637 HC RX REV CODE- 250/637: Performed by: PSYCHIATRY & NEUROLOGY

## 2020-07-23 PROCEDURE — 80061 LIPID PANEL: CPT

## 2020-07-23 PROCEDURE — 82962 GLUCOSE BLOOD TEST: CPT

## 2020-07-23 PROCEDURE — 36415 COLL VENOUS BLD VENIPUNCTURE: CPT

## 2020-07-23 PROCEDURE — 74011250637 HC RX REV CODE- 250/637: Performed by: INTERNAL MEDICINE

## 2020-07-23 PROCEDURE — 93306 TTE W/DOPPLER COMPLETE: CPT

## 2020-07-23 PROCEDURE — 97162 PT EVAL MOD COMPLEX 30 MIN: CPT

## 2020-07-23 PROCEDURE — 97165 OT EVAL LOW COMPLEX 30 MIN: CPT

## 2020-07-23 PROCEDURE — 74011250636 HC RX REV CODE- 250/636: Performed by: EMERGENCY MEDICINE

## 2020-07-23 PROCEDURE — 65660000000 HC RM CCU STEPDOWN

## 2020-07-23 PROCEDURE — 97116 GAIT TRAINING THERAPY: CPT

## 2020-07-23 PROCEDURE — 85027 COMPLETE CBC AUTOMATED: CPT

## 2020-07-23 PROCEDURE — 84443 ASSAY THYROID STIM HORMONE: CPT

## 2020-07-23 PROCEDURE — 80048 BASIC METABOLIC PNL TOTAL CA: CPT

## 2020-07-23 PROCEDURE — 74011636637 HC RX REV CODE- 636/637: Performed by: INTERNAL MEDICINE

## 2020-07-23 PROCEDURE — 97535 SELF CARE MNGMENT TRAINING: CPT

## 2020-07-23 RX ORDER — ACETAMINOPHEN 500 MG
1000 TABLET ORAL
Status: DISCONTINUED | OUTPATIENT
Start: 2020-07-23 | End: 2020-07-24 | Stop reason: HOSPADM

## 2020-07-23 RX ORDER — METOPROLOL TARTRATE 25 MG/1
12.5 TABLET, FILM COATED ORAL EVERY 12 HOURS
Status: DISCONTINUED | OUTPATIENT
Start: 2020-07-23 | End: 2020-07-24 | Stop reason: HOSPADM

## 2020-07-23 RX ORDER — LANOLIN ALCOHOL/MO/W.PET/CERES
3 CREAM (GRAM) TOPICAL
Status: DISCONTINUED | OUTPATIENT
Start: 2020-07-23 | End: 2020-07-24 | Stop reason: HOSPADM

## 2020-07-23 RX ORDER — BACLOFEN 10 MG/1
5 TABLET ORAL
Status: DISCONTINUED | OUTPATIENT
Start: 2020-07-23 | End: 2020-07-24 | Stop reason: HOSPADM

## 2020-07-23 RX ORDER — CLOPIDOGREL BISULFATE 75 MG/1
75 TABLET ORAL DAILY
Status: DISCONTINUED | OUTPATIENT
Start: 2020-07-23 | End: 2020-07-24 | Stop reason: HOSPADM

## 2020-07-23 RX ORDER — LOSARTAN POTASSIUM 50 MG/1
100 TABLET ORAL DAILY
Status: DISCONTINUED | OUTPATIENT
Start: 2020-07-24 | End: 2020-07-23

## 2020-07-23 RX ORDER — POTASSIUM CHLORIDE 750 MG/1
40 TABLET, FILM COATED, EXTENDED RELEASE ORAL ONCE
Status: COMPLETED | OUTPATIENT
Start: 2020-07-23 | End: 2020-07-23

## 2020-07-23 RX ORDER — TRAMADOL HYDROCHLORIDE 50 MG/1
50 TABLET ORAL
Status: DISCONTINUED | OUTPATIENT
Start: 2020-07-23 | End: 2020-07-24 | Stop reason: HOSPADM

## 2020-07-23 RX ADMIN — ATORVASTATIN CALCIUM 40 MG: 40 TABLET, FILM COATED ORAL at 21:46

## 2020-07-23 RX ADMIN — POLYETHYLENE GLYCOL 3350 17 G: 17 POWDER, FOR SOLUTION ORAL at 08:14

## 2020-07-23 RX ADMIN — AMLODIPINE BESYLATE 10 MG: 5 TABLET ORAL at 08:14

## 2020-07-23 RX ADMIN — METOPROLOL TARTRATE 12.5 MG: 25 TABLET, FILM COATED ORAL at 21:41

## 2020-07-23 RX ADMIN — ALPRAZOLAM 1 MG: 0.5 TABLET ORAL at 21:40

## 2020-07-23 RX ADMIN — MELATONIN 3 MG: at 21:41

## 2020-07-23 RX ADMIN — INSULIN LISPRO 2 UNITS: 100 INJECTION, SOLUTION INTRAVENOUS; SUBCUTANEOUS at 11:40

## 2020-07-23 RX ADMIN — ONDANSETRON 4 MG: 2 INJECTION INTRAMUSCULAR; INTRAVENOUS at 09:38

## 2020-07-23 RX ADMIN — ASPIRIN 81 MG CHEWABLE TABLET 81 MG: 81 TABLET CHEWABLE at 08:14

## 2020-07-23 RX ADMIN — CLOPIDOGREL BISULFATE 75 MG: 75 TABLET ORAL at 11:40

## 2020-07-23 RX ADMIN — POTASSIUM CHLORIDE 40 MEQ: 750 TABLET, EXTENDED RELEASE ORAL at 11:40

## 2020-07-23 RX ADMIN — ENOXAPARIN SODIUM 40 MG: 40 INJECTION SUBCUTANEOUS at 21:40

## 2020-07-23 RX ADMIN — TRAMADOL HYDROCHLORIDE 50 MG: 50 TABLET, FILM COATED ORAL at 13:19

## 2020-07-23 NOTE — PROGRESS NOTES
Problem: Self Care Deficits Care Plan (Adult)  Goal: *Acute Goals and Plan of Care (Insert Text)  Description:   FUNCTIONAL STATUS PRIOR TO ADMISSION: Patient was modified independent using a single point cane for functional mobility. HOME SUPPORT: The patient lived alone with family to provide assistance. She does all her ADLS and drives    Occupational Therapy Goals  Initiated 7/23/2020  1. Patient will participate in upper extremity therapeutic exercise/activities for coordination and sensory deficits with independence for 10 minutes within 7 day(s). 2. Patient will be modified independent with all buttons and fasteners within 7 days. Outcome: Progressing Towards Goal       OCCUPATIONAL THERAPY EVALUATION  Patient: Dior Bucio (25 y.o. female)  Date: 7/23/2020  Primary Diagnosis: Stroke (cerebrum) [facility]) [I63.9]        Precautions: fall       ASSESSMENT  Based on the objective data described below, the patient presents with minor sensation and coordination deficits in LUE. She states the sensation bother her the most, though she is functional. She is also stable walking with SPC. Educated her on availability of outpatient services to address these deficits if she desires. Focused on general safety with decreased sensation. Will follow up for home HEP for LUE coordination deficits. Current Level of Function Impacting Discharge (ADLs/self-care): supervision to I    Functional Outcome Measure: The patient scored Total A-D  Total A-D (Motor Function): 62/66 on the Fugl-Shipley Assessment which is indicative of mild impairment in upper extremity functional status. Other factors to consider for discharge: none     Patient will benefit from skilled therapy intervention to address the above noted impairments.        PLAN :  Recommendations and Planned Interventions: self care training, functional mobility training, therapeutic exercise, balance training, therapeutic activities, endurance activities, neuromuscular re-education, patient education, home safety training, and family training/education    Frequency/Duration: Patient will be followed by occupational therapy 3 times a week to address goals. Recommendation for discharge: (in order for the patient to meet his/her long term goals)  Outpatient occupational therapy follow up recommended for LUE soordination/sensation deficits    This discharge recommendation:  A follow-up discussion with the attending provider and/or case management is planned    IF patient discharges home will need the following DME: patient owns DME required for discharge       SUBJECTIVE:   Patient stated it bothers me the way my hand feels.     OBJECTIVE DATA SUMMARY:   HISTORY:   Past Medical History:   Diagnosis Date    Arthritis     Breast cyst     Depressive disorder, not elsewhere classified 6/17/2013    Fibromyalgia 6/17/2013    Hypertension     Menopause     Mixed hyperlipidemia 6/17/2013    Type II or unspecified type diabetes mellitus without mention of complication, not stated as uncontrolled 6/17/2013    Unspecified vitamin D deficiency 6/17/2013     Past Surgical History:   Procedure Laterality Date    ABDOMEN SURGERY PROC UNLISTED  6/2013    Colon resection    HX BREAST BIOPSY Left     cyst removal    HX CHOLECYSTECTOMY      HX COLONOSCOPY  7/28/14    HX HYSTERECTOMY      HX MOHS PROCEDURES      HX OOPHORECTOMY      HX OTHER SURGICAL      cyst removed from spine     Expanded or extensive additional review of patient history:     Home Situation  Home Environment: Private residence  # Steps to Enter: 6  Rails to Enter: Yes  [employer] : Bilateral(spaced far apart)  One/Two Story Residence: One story  Living Alone: Yes  Support Systems: Child(lizzette)(kids come by and visit frequently)  Patient Expects to be Discharged to[de-identified] Private residence  Current DME Used/Available at Home: Shower chair, Cane, straight  Tub or Shower Type: Tub/Shower combination    Hand dominance: Right    EXAMINATION OF PERFORMANCE DEFICITS:  Cognitive/Behavioral Status:  Neurologic State: Alert  Orientation Level: Oriented X4  Cognition: Follows commands  Perception: Appears intact  Perseveration: No perseveration noted  Safety/Judgement: Awareness of environment    Skin: intact    Edema: none    Hearing: Auditory  Auditory Impairment: None    Vision/Perceptual:    Tracking: Able to track stimulus in all quadrants w/o difficulty                      Acuity: Impaired near vision; Impaired far vision    Corrective Lenses: Glasses    Range of Motion:    AROM: Within functional limits  PROM: Within functional limits                      Strength:    Strength: Within functional limits                Coordination:  Coordination: Within functional limits  Fine Motor Skills-Upper: Left Impaired;Right Intact    Gross Motor Skills-Upper: Left Intact; Right Intact    Tone & Sensation:    Tone: Normal  Sensation: Impaired(in L hand- 4 fingers)                      Balance:  Sitting: Intact; Without support  Standing: Intact; With support  Standing - Static: Good  Standing - Dynamic : Good    Functional Mobility and Transfers for ADLs:  Bed Mobility:  Rolling: Independent  Supine to Sit: Independent  Sit to Supine: Independent  Scooting: Independent    Transfers:  Sit to Stand: Supervision  Stand to Sit: Supervision  Bed to Chair: Supervision    ADL Assessment:  Feeding: Independent    Oral Facial Hygiene/Grooming: Independent    Bathing: Supervision    Upper Body Dressing: Supervision; Additional time(has some coordination delay and sensation deficits)    Lower Body Dressing: Supervision    Toileting: Supervision                ADL Intervention and task modifications:                           Lower Body Dressing Assistance  Socks:  Independent         Cognitive Retraining  Safety/Judgement: Awareness of environment    Functional Measure:  Fugl-Shipley Assessment of Motor Recovery after Stroke:   Reflex Activity  Flexors/Biceps/Fingers: Can be elicited  Extensors/Triceps: Can be elicited  Reflex Subtotal: 4    Volitional Movement Within Synergies  Shoulder Retraction: Full  Shoulder Elevation: Full  Shoulder Abduction (90 degrees): Full  Shoulder External Rotation: Full  Elbow Flexion: Full  Forearm Supination: Full  Shoulder Adduction/Internal Rotation: Full  Elbow Extension: Full  Forearm Pronation: Full  Subtotal: 18    Volitional Movement Mixing Synergies  Hand to Lumbar Spine: Full  Shoulder Flexion (0-90 degrees): Full  Pronation-Supination: Full  Subtotal: 6    Volitional Movement With Little or No Synergy  Shoulder Abduction (0-90 degrees): Full  Shoulder Flexion ( degrees): Full  Pronation/Supination: Full  Subtotal : 6    Normal Reflex Activity  Biceps, Triceps, Finger Flexors: Full  Subtotal : 2    Upper Extremity Total   Upper Extremity Total: 36    Wrist  Stability at 15 Degree Dorsiflexion: Full  Repeated Dorsiflexion/ Volar Flexion: Full  Stability at 15 Degree Dorsiflexion: Full  Repeated Dorsiflexion/ Volar Flexion: Full  Circumduction: Full  Wrist Total: 10    Hand  Mass Flexion: Full  Mass Extension: Full  Grasp A: Full  Grasp B: Full  Grasp C: Partial  Grasp D: Full  Grasp E: Full  Hand Total: 13    Coordination/Speed  Tremor: Slight  Dysmetria: Slight  Time: 2-5s  Coordination/Speed Total : 3    Total A-D  Total A-D (Motor Function): 62/66     This is a reliable/valid measure of arm function after a neurological event. It has established value to characterize functional status and for measuring spontaneous and therapy-induced recovery; tests proximal and distal motor functions. Fugl-Shipley Assessment - UE scores recorded between five and 30 days post neurologic event can be used to predict UE recovery at six months post neurologic event.   Severe = 0-21 points   Moderately Severe = 22-33 points   Moderate = 34-47 points   Mild = 48-66 points  KATELYN Almazan, Felecia, AN, & TRISTON Munguia (1992). Measurement of motor recovery after stroke: Outcome assessment and sample size requirements. Stroke, 23, pp. 2349-6378.   ------------------------------------------------------------------------------------------------------------------------------------------------------------------  MCID:  Stroke:   Brigid Quintanilla et al, 2001; n = 171; mean age 79 (6) years; assessed within 16 (12) days of stroke, Acute Stroke)  FMA Motor Scores from Admission to Discharge   10 point increase in FMA Upper Extremity = 1.5 change in discharge FIM   10 point increase in FMA Lower Extremity = 1.9 change in discharge FIM  MDC:   Stroke:   Karla Fuentes et al, 2008, n = 14, mean age = 59.9 (14.6) years, assessed on average 14 (6.5) months post stroke, Chronic Stroke)   FMA = 5.2 points for the Upper Extremity portion of the assessment     Occupational Therapy Evaluation Charge Determination   History Examination Decision-Making   LOW Complexity : Brief history review  LOW Complexity : 1-3 performance deficits relating to physical, cognitive , or psychosocial skils that result in activity limitations and / or participation restrictions  LOW Complexity : No comorbidities that affect functional and no verbal or physical assistance needed to complete eval tasks       Based on the above components, the patient evaluation is determined to be of the following complexity level: LOW   Pain Rating:  Mild headache    Activity Tolerance:   Good  Please refer to the flowsheet for vital signs taken during this treatment. After treatment patient left in no apparent distress:    Sitting in chair and Call bell within reach    COMMUNICATION/EDUCATION:   The patients plan of care was discussed with: Physical therapist and Registered nurse. Patient was educated regarding her deficit(s) of L sided coordination and sensation deficits as this relates to her diagnosis of CVA.   She demonstrated Good understanding as evidenced by verbal feedback . Patient and/or family was verbally educated on the BE FAST acronym for signs/symptoms of CVA and TIA. BE FAST was written on patient's communication board  for visual education and reinforcement. All questions answered with patient indicating good understanding. Home safety education was provided and the patient/caregiver indicated understanding., Patient/family have participated as able in goal setting and plan of care. , and Patient/family agree to work toward stated goals and plan of care. This patients plan of care is appropriate for delegation to \[facility] [place] [facility]\"".     Thank you for this referral.  Dipesh Tenorio  Time Calculation: 30 mins

## 2020-07-23 NOTE — PROGRESS NOTES
Problem: TIA/CVA Stroke: 0-24 hours  Goal: Off Pathway (Use only if patient is Off Pathway)  Outcome: Progressing Towards Goal  Goal: Activity/Safety  Outcome: Progressing Towards Goal  Goal: Consults, if ordered  Outcome: Progressing Towards Goal  Goal: Diagnostic Test/Procedures  Outcome: Progressing Towards Goal  Goal: Nutrition/Diet  Outcome: Progressing Towards Goal  Goal: Discharge Planning  Outcome: Progressing Towards Goal  Goal: Medications  Outcome: Progressing Towards Goal  Goal: Respiratory  Outcome: Progressing Towards Goal  Goal: Treatments/Interventions/Procedures  Outcome: Progressing Towards Goal  Goal: Minimize risk of bleeding post-thrombolytic infusion  Outcome: Progressing Towards Goal  Goal: Monitor for complications post-thrombolytic infusion  Outcome: Progressing Towards Goal  Goal: Psychosocial  Outcome: Progressing Towards Goal  Goal: *Hemodynamically stable  Outcome: Progressing Towards Goal  Goal: *Neurologically stable  Description: Absence of additional neurological deficits    Outcome: Progressing Towards Goal  Goal: *Verbalizes anxiety and depression are reduced or absent  Outcome: Progressing Towards Goal  Goal: *Absence of Signs of Aspiration on Current Diet  Outcome: Progressing Towards Goal  Goal: *Absence of deep venous thrombosis signs and symptoms(Stroke Metric)  Outcome: Progressing Towards Goal  Goal: *Ability to perform ADLs and demonstrates progressive mobility and function  Outcome: Progressing Towards Goal  Goal: *Stroke education started(Stroke Metric)  Outcome: Progressing Towards Goal  Goal: *Dysphagia screen performed(Stroke Metric)  Outcome: Progressing Towards Goal  Goal: *Rehab consulted(Stroke Metric)  Outcome: Progressing Towards Goal

## 2020-07-23 NOTE — PROGRESS NOTES
Hospitalist Progress Note    NAME: Myriam Santana   :  1955   MRN:  [id number]       Assessment / Plan:  Acute CVA  Neck pain  CT head -No evidence of acute intracranial abnormality.  Moderate chronic microvascular ischemic disease.   MRI head- Multiple small acute infarcts in the right frontoparietal white matter.  Moderate chronic microvascular ischemic disease. Numerous chronic microhemorrhages in the bilateral thalami, and few additional scattered supratentorial and infratentorial chronic microhemorrhages as above. Jilda Rend evidence of flow-limiting stenosis.  Multifocal atherosclerotic disease as above, including severe stenosis of the left P2 segment, moderate stenosis of the distal right M1 segment and moderate stenosis of the bilateral M2 segments.  A 2 mm right posterior communicating artery infundibulum versus aneurysm.   CTA Neck-  No evidence of flow-limiting stenosis.  Mild stenosis (less than 50% by NASCET criteria) of the proximal left internal carotid artery. Check Echo, labs: TSH wnl ,  A1C 7% . LDL 92  flexeril prn  C/w  ASA and statin.  Pt was not on ASA PTA.  Neurology consulted  PT/OT     hypokalemia  HTN  Cont' losartan and amlodipine, titrate prn. C/w iV hydralazine.   add metoprolol as SBP > 160   Replete K   Anxiety/depression  Cont' home xanax prn  Denies SI/HI     T2DM  On metformin and Januvia at home, will hold  SSI     Code Status: Full  Surrogate Decision Maker: pt's daughter  DVT Prophylaxis: lovenox  GI Prophylaxis: not indicated     Baseline: from home, independent  OP PT     25.0 - 29.9 Overweight / Body mass index is 27.87 kg/m². Subjective:     Chief Complaint / Reason for Physician Visit  FU CVA . C/o neck pain and HA . Also c/o numbness R hand . Discussed with RN events overnight.      Review of Systems:  Symptom Y/N Comments  Symptom Y/N Comments   Fever/Chills n   Chest Pain n    Poor Appetite    Edema     Cough n   Abdominal Pain n    Sputum Joint Pain     SOB/LANCASTER n   Pruritis/Rash     Nausea/vomit n   Tolerating PT/OT     Diarrhea n   Tolerating Diet y    Constipation    Other       Could NOT obtain due to:      Objective:     VITALS:   Last 24hrs VS reviewed since prior progress note. Most recent are:  Patient Vitals for the past 24 hrs:   Temp Pulse Resp BP SpO2   07/23/20 0844    (!) 141/99    07/23/20 0702 98 °F (36.7 °C) 82 16 (!) 144/111 98 %   07/23/20 0422 98.3 °F (36.8 °C) 73 18 (!) 147/105 97 %   07/22/20 2321 97.9 °F (36.6 °C) 81 18 (!) 136/104 98 %   07/22/20 2010  75  (!) 174/110 99 %   07/22/20 1732 99 °F (37.2 °C) 84 18 (!) 163/98 99 %   07/22/20 1230    153/86 99 %   07/22/20 1100    (!) 156/97 100 %     No intake or output data in the 24 hours ending 07/23/20 1055     PHYSICAL EXAM:  General: WD, WN. Alert, cooperative, no acute distress    EENT:  EOMI. Anicteric sclerae. MMM  Resp:  CTA bilaterally, no wheezing or rales. No accessory muscle use  CV:  Regular  rhythm,  No edema  GI:  Soft, Non distended, Non tender.  +Bowel sounds  Neurologic:  Alert and oriented X 3, normal speech, R hand weak   Psych:   Good insight. Not anxious nor agitated  Skin:  No rashes. No jaundice    Reviewed most current lab test results and cultures  YES  Reviewed most current radiology test results   YES  Review and summation of old records today    NO  Reviewed patient's current orders and MAR    YES  PMH/SH reviewed - no change compared to H&P  ________________________________________________________________________  Care Plan discussed with:    Comments   Patient x    Family      RN x    Care Manager     Consultant  x Neuro                      Multidiciplinary team rounds were held today with , nursing, pharmacist and clinical coordinator. Patient's plan of care was discussed; medications were reviewed and discharge planning was addressed.      ________________________________________________________________________  Total NON critical care TIME:  35 Minutes    Total CRITICAL CARE TIME Spent:   Minutes non procedure based      Comments   >50% of visit spent in counseling and coordination of care x    ________________________________________________________________________  Jose Freeman MD     Procedures: see electronic medical records for all procedures/Xrays and details which were not copied into this note but were reviewed prior to creation of Plan. LABS:  I reviewed today's most current labs and imaging studies.   Pertinent labs include:  Recent Labs     07/23/20  0333 07/22/20  1059   WBC 8.0 9.5   HGB 13.5 14.0   HCT 41.2 42.7    384     Recent Labs     07/23/20  0333 07/22/20  1059   * 136   K 3.4* 3.4*    100   CO2 27 30   * 111*   BUN 14 14   CREA 0.66 0.90   CA 9.1 9.5   ALB  --  3.5   TBILI  --  0.5   ALT  --  14   INR  --  1.0       Signed: Jose Freeman MD

## 2020-07-23 NOTE — H&P
Hospitalist Admission Note    NAME: Janusz Hogan   :  1955   MRN:  [id number]     Date/Time:  2020 3:41 PM    Patient PCP: Charly Thomason MD  ______________________________________________________________________  Given the patient's current clinical presentation, I have a high level of concern for decompensation if discharged from the emergency department. Complex decision making was performed, which includes reviewing the patient's available past medical records, laboratory results, and x-ray films. My assessment of this patient's clinical condition and my plan of care is as follows. Note:  This H&P was completed by Dr. Violeta Bloch on 2020 at time of admission. Her original note was classified under Progress note. I copied her note and placed in under H&P category. Assessment / Plan:  Acute CVA  Neck pain  CT head -No evidence of acute intracranial abnormality. Moderate chronic microvascular ischemic disease.   MRI head- Multiple small acute infarcts in the right frontoparietal white matter. Moderate chronic microvascular ischemic disease. Numerous chronic microhemorrhages in the bilateral thalami, and few additional scattered supratentorial and infratentorial chronic microhemorrhages as above.  CTA Head-   No evidence of flow-limiting stenosis. Multifocal atherosclerotic disease as above, including severe stenosis of the left P2 segment, moderate stenosis of the distal right M1 segment and moderate stenosis of the bilateral M2 segments. A 2 mm right posterior communicating artery infundibulum versus aneurysm.   CTA Neck-  No evidence of flow-limiting stenosis. Mild stenosis (less than 50% by NASCET criteria) of the proximal left internal carotid artery. Check Echo, labs: TSH, A1C, Lipid  Add flexeril prn  Start ASA and statin. Pt was not on ASA PTA.   I reviewed PTA meds with her daughter  Neurology consulted  PT/OT    HTN  Cont' losartan and amlodipine, titrate prn. Add iV hydralazine. May need to add another agent if BP not well control    Anxiety/depression  Cont' home xanax prn  Denies SI/HI    T2DM  On metformin and Januvia at home, will hold  SSI    Code Status: Full  Surrogate Decision Maker: pt's daughter  DVT Prophylaxis: lovenox  GI Prophylaxis: not indicated    Baseline: from home, independent      Subjective:   CHIEF COMPLAINT: L hand weakness    HISTORY OF PRESENT ILLNESS:     Maikel Orantes is a 76 y.o.  female who presents to the ER complaining of numbness and weakness to her L hand. Symptoms started \"on a Sunday\". Pt states she came in today because she got to the point where she can't sleep due to pain in R side of her neck, L hand numbness and tingling. She also had some difficulty with slurred speech about 2 weeks ago while she was in the heat, improved when she cooled down. She denies any difficulty with ambulation, weakness in her legs. She currently has a headache and nausea, but no vomiting. Denies any fever, chills, cp, sob, palpitations. Vitals/labs/images reviewed  In the ER, pt had an MRI head with multiple small acute infacrcts in the R frontoparietal white matter. We were asked to admit for work up and evaluation of the above problems.      Past Medical History:   Diagnosis Date    Arthritis     Breast cyst     Depressive disorder, not elsewhere classified 6/17/2013    Fibromyalgia 6/17/2013    Hypertension     Menopause     Mixed hyperlipidemia 6/17/2013    Type II or unspecified type diabetes mellitus without mention of complication, not stated as uncontrolled 6/17/2013    Unspecified vitamin D deficiency 6/17/2013        Past Surgical History:   Procedure Laterality Date    ABDOMEN SURGERY PROC UNLISTED  6/2013    Colon resection    HX BREAST BIOPSY Left     cyst removal    HX CHOLECYSTECTOMY      HX COLONOSCOPY  7/28/14    HX HYSTERECTOMY      HX MOHS PROCEDURES      HX OOPHORECTOMY  HX OTHER SURGICAL      cyst removed from spine       Social History     Tobacco Use    Smoking status: Former Smoker    Tobacco comment: quit 2010   Substance Use Topics    Alcohol use: No        Family History   Problem Relation Age of Onset    Diabetes Mother     Hypertension Mother     Kidney Disease Mother     Diabetes Father     Heart Disease Father     Hypertension Sister     Stroke Sister     Heart Disease Sister     Hypertension Sister     Hypertension Brother     Hypertension Sister      Allergies   Allergen Reactions    Bees [Hymenoptera Allergenic Extract] Nausea Only    Menthol Chest Rub [Camph-Eucalypt-Men-Turp-Pet] Rash    Percocet [Oxycodone-Acetaminophen] Unknown (comments)        Prior to Admission medications    Medication Sig Start Date End Date Taking? Authorizing Provider   metFORMIN (GLUCOPHAGE) 1,000 mg tablet Take 1,000 mg by mouth two (2) times daily (with meals). Yes Provider, Historical   losartan (COZAAR) 100 mg tablet Take 100 mg by mouth daily. Yes Provider, Historical   SITagliptin (Januvia) 100 mg tablet Take 100 mg by mouth daily. Yes Provider, Historical   baclofen (LIORESAL) 10 mg tablet Take 5 mg by mouth three (3) times daily as needed for Muscle Spasm(s). Yes Provider, Historical   amLODIPine (NORVASC) 10 mg tablet Take 1 Tab by mouth daily. 11/19/15  Yes Regina Velarde NP   atorvastatin (LIPITOR) 10 mg tablet TAKE ONE TABLET BY MOUTH ONCE DAILY  Patient taking differently: 20 mg. TAKE ONE TABLET BY MOUTH ONCE DAILY 11/17/15  Yes Regina Velarde NP   Nebulizer Accessories kit Use as directed. 10/1/15  Yes Regina Velarde NP   glucose blood VI test strips (ASCENSIA AUTODISC VI, ONE TOUCH ULTRA TEST VI) strip Test blood sugar three time daily. 6/29/15  Yes Regina Velarde NP   acetaminophen (TYLENOL EXTRA STRENGTH) 500 mg tablet Take  by mouth every six (6) hours as needed for Pain.    Yes Provider, Historical ALPRAZolam (XANAX) 1 mg tablet Take 1 Tab by mouth two (2) times daily as needed for Anxiety. Max Daily Amount: 2 mg. Indications: ANXIETY WITH DEPRESSION 1/19/16   Mauricio Rivera NP   albuterol [facility]) 90 mcg/actuation inhaler Take 1 puff by inhalation every four (4) hours as needed for Wheezing or Shortness of Breath. 12/5/14   Mauricio Rivera NP   cholecalciferol, vitamin D3, (VITAMIN D3) 2,000 unit tab Take  by mouth daily. Provider, Historical   0.9 % SODIUM CHLORIDE (NASAL MIST NA) by Nasal route. Provider, Historical   albuterol (PROVENTIL VENTOLIN) 2.5 mg /3 mL (0.083 %) nebulizer solution 3 mL by Nebulization route every four (4) hours as needed for Wheezing. 11/20/14   KAUR Mckeon       REVIEW OF SYSTEMS:     I am not able to complete the review of systems because:    The patient is intubated and sedated    The patient has altered mental status due to his acute medical problems    The patient has baseline aphasia from prior stroke(s)    The patient has baseline dementia and is not reliable historian    The patient is in acute medical distress and unable to provide information           Total of 12 systems reviewed as follows:       POSITIVE= underlined text  Negative = text not underlined  General:  fever, chills, sweats, generalized weakness, weight loss/gain,      loss of appetite   Eyes:    blurred vision, eye pain, loss of vision, double vision  ENT:    rhinorrhea, pharyngitis   Respiratory:   cough, sputum production, SOB, [place], wheezing, pleuritic pain   Cardiology:   chest pain, palpitations, orthopnea, PND, edema, syncope   Gastrointestinal:  abdominal pain , N/V, diarrhea, dysphagia, constipation, bleeding   Genitourinary:  frequency, urgency, dysuria, hematuria, incontinence   Muskuloskeletal :  arthralgia, myalgia, back pain  Hematology:  easy bruising, nose or gum bleeding, lymphadenopathy   Dermatological: rash, ulceration, pruritis, color change / jaundice  Endocrine:   hot flashes or polydipsia   Neurological:  headache, dizziness, confusion, focal weakness, paresthesia,     Speech difficulties, memory loss, gait difficulty  Psychological: Feelings of anxiety, depression, agitation    Objective:   VITALS:    Visit Vitals  BP (!) 144/111 (BP 1 Location: Left arm, BP Patient Position: At rest)   Pulse 82   Temp 98 °F (36.7 °C)   Resp 16   Ht 5' 1\" (1.549 m)   Wt 66.9 kg (147 lb 7.8 oz)   SpO2 98%   BMI 27.87 kg/m²       PHYSICAL EXAM:    General:    Alert, cooperative, no distress, appears stated age. HEENT: Atraumatic, anicteric sclerae, pink conjunctivae     No oral ulcers, mucosa moist, throat clear, dentition fair  Neck:  Supple, symmetrical,  thyroid: non tender  Lungs:   Clear to auscultation bilaterally. No Wheezing or Rhonchi. No rales. Chest wall:  No tenderness  No Accessory muscle use. Heart:   Regular  rhythm,  No  murmur   No edema  Abdomen:   Soft, non-tender. Not distended. Bowel sounds normal  Extremities: No cyanosis. No clubbing,      Skin turgor normal, Capillary refill normal, Radial dial pulse 2+  Skin:     Not pale. Not Jaundiced  No rashes   Psych:  Good insight. Not depressed. Not anxious or agitated. Neurologic: EOMs intact. No facial asymmetry. No aphasia or slurred speech. Symmetrical strength, some numbness on L hand.   Alert and oriented X 4.     _______________________________________________________________________  Care Plan discussed with:    Comments   Patient x    Family  x    RN x    Care Manager                    Consultant:      _______________________________________________________________________  Expected  Disposition:   Home with Family    HH/PT/OT/RN x   SNF/LTC    WILLIE    ________________________________________________________________________  TOTAL TIME: 72 Minutes    Critical Care Provided     Minutes non procedure based      Comments    x Reviewed previous records   >50% of visit spent in counseling and coordination of care x Discussion with patient and/or family and questions answered       ________________________________________________________________________  Signed: Yaron Fagan MD    Procedures: see electronic medical records for all procedures/Xrays and details which were not copied into this note but were reviewed prior to creation of Plan. LAB DATA REVIEWED:    Recent Results (from the past 24 hour(s))   GLUCOSE, POC    Collection Time: 07/22/20 10:58 AM   Result Value Ref Range    Glucose (POC) 115 (H) 65 - 100 mg/dL    Performed by [place] [facility]) Jonathan Ocampo    CBC WITH AUTOMATED DIFF    Collection Time: 07/22/20 10:59 AM   Result Value Ref Range    WBC 9.5 3.6 - 11.0 K/uL    RBC 4.72 3.80 - 5.20 M/uL    HGB 14.0 11.5 - 16.0 g/dL    HCT 42.7 35.0 - 47.0 %    MCV 90.5 80.0 - 99.0 FL    MCH 29.7 26.0 - 34.0 PG    MCHC 32.8 30.0 - 36.5 g/dL    RDW 13.0 11.5 - 14.5 %    PLATELET 956 277 - 233 K/uL    MPV 10.0 8.9 - 12.9 FL    NRBC 0.0 0  WBC    ABSOLUTE NRBC 0.00 0.00 - 0.01 K/uL    NEUTROPHILS 60 32 - 75 %    LYMPHOCYTES 29 12 - 49 %    MONOCYTES 10 5 - 13 %    EOSINOPHILS 0 0 - 7 %    BASOPHILS 1 0 - 1 %    IMMATURE GRANULOCYTES 0 0.0 - 0.5 %    ABS. NEUTROPHILS 5.7 1.8 - 8.0 K/UL    ABS. LYMPHOCYTES 2.7 0.8 - 3.5 K/UL    ABS. MONOCYTES 0.9 0.0 - 1.0 K/UL    ABS. EOSINOPHILS 0.0 0.0 - 0.4 K/UL    ABS. BASOPHILS 0.1 0.0 - 0.1 K/UL    ABS. IMM.  GRANS. 0.0 0.00 - 0.04 K/UL    DF AUTOMATED     METABOLIC PANEL, COMPREHENSIVE    Collection Time: 07/22/20 10:59 AM   Result Value Ref Range    Sodium 136 136 - 145 mmol/L    Potassium 3.4 (L) 3.5 - 5.1 mmol/L    Chloride 100 97 - 108 mmol/L    CO2 30 21 - 32 mmol/L    Anion gap 6 5 - 15 mmol/L    Glucose 111 (H) 65 - 100 mg/dL    BUN 14 6 - 20 MG/DL    Creatinine 0.90 0.55 - 1.02 MG/DL    BUN/Creatinine ratio 16 12 - 20      GFR est AA >60 >60 ml/min/1.73m2    GFR est non-AA >60 >60 ml/min/1.73m2    Calcium 9.5 8.5 - 10.1 MG/DL    Bilirubin, total 0.5 0.2 - 1.0 MG/DL    ALT (SGPT) 14 12 - 78 U/L    AST (SGOT) 9 (L) 15 - 37 U/L    Alk.  phosphatase 84 45 - 117 U/L    Protein, total 8.3 (H) 6.4 - 8.2 g/dL    Albumin 3.5 3.5 - 5.0 g/dL    Globulin 4.8 (H) 2.0 - 4.0 g/dL    A-G Ratio 0.7 (L) 1.1 - 2.2     PROTHROMBIN TIME + INR    Collection Time: 07/22/20 10:59 AM   Result Value Ref Range    INR 1.0 0.9 - 1.1      Prothrombin time 10.9 9.0 - 11.1 sec   TROPONIN I    Collection Time: 07/22/20 10:59 AM   Result Value Ref Range    Troponin-I, Qt. <0.05 <0.05 ng/mL   EKG, 12 LEAD, INITIAL    Collection Time: 07/22/20 11:05 AM   Result Value Ref Range    Ventricular Rate 78 BPM    Atrial Rate 78 BPM    P-R Interval 166 ms    QRS Duration 78 ms    Q-T Interval 386 ms    QTC Calculation (Bezet) 440 ms    Calculated P Axis 40 degrees    Calculated R Axis -3 degrees    Calculated T Axis 16 degrees    Diagnosis       Normal sinus rhythm  Possible Left atrial enlargement  Left ventricular hypertrophy  No previous ECGs available     GLUCOSE, POC    Collection Time: 07/22/20  9:15 PM   Result Value Ref Range    Glucose (POC) 178 (H) 65 - 100 mg/dL    Performed by Leigh Velázquez [facility]    LIPID PANEL    Collection Time: 07/23/20  3:33 AM   Result Value Ref Range    LIPID PROFILE          Cholesterol, total 170 <200 MG/DL    Triglyceride 144 <150 MG/DL    HDL Cholesterol 49 MG/DL    LDL, calculated 92.2 0 - 100 MG/DL    VLDL, calculated 28.8 MG/DL    CHOL/HDL Ratio 3.5 0.0 - 5.0     HEMOGLOBIN A1C WITH EAG    Collection Time: 07/23/20  3:33 AM   Result Value Ref Range    Hemoglobin A1c 7.0 (H) 4.0 - 5.6 %    Est. average glucose 154 mg/dL   TSH 3RD GENERATION    Collection Time: 07/23/20  3:33 AM   Result Value Ref Range    TSH 1.69 0.36 - 9.01 uIU/mL   METABOLIC PANEL, BASIC    Collection Time: 07/23/20  3:33 AM   Result Value Ref Range    Sodium 135 (L) 136 - 145 mmol/L    Potassium 3.4 (L) 3.5 - 5.1 mmol/L    Chloride 101 97 - 108 mmol/L    CO2 27 21 - 32 mmol/L    Anion gap 7 5 - 15 mmol/L    Glucose 107 (H) 65 - 100 mg/dL    BUN 14 6 - 20 MG/DL    Creatinine 0.66 0.55 - 1.02 MG/DL    BUN/Creatinine ratio 21 (H) 12 - 20      GFR est AA >60 >60 ml/min/1.73m2    GFR est non-AA >60 >60 ml/min/1.73m2    Calcium 9.1 8.5 - 10.1 MG/DL   CBC W/O DIFF    Collection Time: 07/23/20  3:33 AM   Result Value Ref Range    WBC 8.0 3.6 - 11.0 K/uL    RBC 4.53 3.80 - 5.20 M/uL    HGB 13.5 11.5 - 16.0 g/dL    HCT 41.2 35.0 - 47.0 %    MCV 90.9 80.0 - 99.0 FL    MCH 29.8 26.0 - 34.0 PG    MCHC 32.8 30.0 - 36.5 g/dL    RDW 12.8 11.5 - 14.5 %    PLATELET 728 416 - 954 K/uL    MPV 9.8 8.9 - 12.9 FL    NRBC 0.0 0  WBC    ABSOLUTE NRBC 0.00 0.00 - 0.01 K/uL   GLUCOSE, POC    Collection Time: 07/23/20  7:17 AM   Result Value Ref Range    Glucose (POC) 106 (H) 65 - 100 mg/dL    Performed by Edmundo Chiu (PCT)

## 2020-07-23 NOTE — ROUTINE PROCESS
Bedside and Verbal shift change report given to Meek (oncoming nurse) by Chong Francis (offgoing nurse). Report included the following information [facility].     [facility] Phone:   [phone]           Significant changes during shift: echo done, seen by OT, PT, speech        Patient Information     Gino Baer  76 y.o.  7/22/2020 10:23 AM by Bhavin Gaytan MD. Gino Baer was admitted from Home     Problem List          Patient Active Problem List     Diagnosis Date Noted    [facility] 07/22/2020    Sleep apnea 07/16/2015    Insomnia 01/02/2015    Essential hypertension, benign 06/17/2013    Mixed hyperlipidemia 06/17/2013    Type II diabetes mellitus ([facility] 06/17/2013    Fibromyalgia 06/17/2013    Dysthymic disorder 06/17/2013    Depressive disorder, not elsewhere classified 06/17/2013    Unspecified vitamin D deficiency 06/17/2013          Past Medical History:   Diagnosis Date    Arthritis      Breast cyst      Depressive disorder, not elsewhere classified 6/17/2013    Fibromyalgia 6/17/2013    Hypertension      Menopause      Mixed hyperlipidemia 6/17/2013    Type II or unspecified type diabetes mellitus without mention of complication, not stated as uncontrolled 6/17/2013    Unspecified vitamin D deficiency 6/17/2013           Core Measures:     CVA: Yes Yes  CHF:No No  PNA:No No        Activity Status:     Stand by assist        Supplemental O2: (If Applicable)     n/a        LINES AND DRAINS:     PIV        DVT prophylaxis:  Lovenox     Wounds: (If Applicable)     N/A     Patient Safety:     Falls Score Total Score: 0  Safety Level_______  Bed Alarm On? Yes  Sitter?  No     Plan for upcoming shift: safety           Discharge Plan: No ,ongoing     Active Consults:  IP CONSULT TO HOSPITALIST  IP CONSULT TO NEUROLOGY

## 2020-07-23 NOTE — PROGRESS NOTES
PHYSICAL THERAPY EVALUATION WITH DISCHARGE  Patient: Malinda Richter (69 y.o. female)  Date: 7/23/2020  Primary Diagnosis: Stroke (cerebrum) [facility]) [I63.9]       Precautions:          ASSESSMENT  Based on the objective data described below, the patient presents with good strength, good functional mobility, steady gait with SPC, and no balance deficits noted following admission for stroke. Patient is at her functional baseline except for L hand numbness in first 4 digits and moderate occipital headache. Patient does report occasional vision blurring although did not impact gait or balance. Patient with safe and steady gait with no LOB noted. Performed head turns and nods with no LOB noted and no path deviations. Encouraged patient to sit in the chair for all meals and continue ambulating in the room as she is at her baseline. RN in agreement for up ad alexsandra status. Functional Outcome Measure: The patient scored Total: 49/56 on the Vazquez Balance Assessment which is indicative of low fall risk. Other factors to consider for discharge: CVA     Further skilled acute physical therapy is not indicated at this time. PLAN :  Recommendation for discharge: (in order for the patient to meet his/her long term goals)  Outpatient physical therapy follow up recommended for balance- if patient interested    This discharge recommendation:  Has not yet been discussed the attending provider and/or case management    IF patient discharges home will need the following DME: none         SUBJECTIVE:   Patient stated I need to get home because today is my birthday.     OBJECTIVE DATA SUMMARY:   HISTORY:    Past Medical History:   Diagnosis Date    Arthritis     Breast cyst     Depressive disorder, not elsewhere classified 6/17/2013    Fibromyalgia 6/17/2013    Hypertension     Menopause     Mixed hyperlipidemia 6/17/2013    Type II or unspecified type diabetes mellitus without mention of complication, not stated as uncontrolled 6/17/2013    Unspecified vitamin D deficiency 6/17/2013     Past Surgical History:   Procedure Laterality Date    ABDOMEN SURGERY PROC UNLISTED  6/2013    Colon resection    HX BREAST BIOPSY Left     cyst removal    HX CHOLECYSTECTOMY      HX COLONOSCOPY  7/28/14    HX HYSTERECTOMY      HX MOHS PROCEDURES      HX OOPHORECTOMY      HX OTHER SURGICAL      cyst removed from spine       Prior level of function: patient lives alone although her children have been staying with her recently. She is independent with all aspects of functional mobility and ADLS. She reports she occasionally uses a SPC in the home and community. She reports one fall in which she slipped in mud. Personal factors and/or comorbidities impacting plan of care: CVA    Home Situation  Home Environment: Private residence  # Steps to Enter: 6  Rails to Enter: Yes  Hand Rails : Bilateral(spaced far apart)  One/Two Story Residence: One story  Living Alone: Yes  Support Systems: Child(lizzette)(kids come by and visit frequently)  Patient Expects to be Discharged to[de-identified] Private residence  Current DME Used/Available at Home: Shower chair, Cane, straight  Tub or Shower Type: Tub/Shower combination    EXAMINATION/PRESENTATION/DECISION MAKING:   Critical Behavior:  Neurologic State: Alert  Orientation Level: Oriented X4  Cognition: Follows commands  Safety/Judgement: Awareness of environment  Hearing: Auditory  Auditory Impairment: None  Skin:    Edema:   Range Of Motion:  AROM: Within functional limits           PROM: Within functional limits           Strength:    Strength: Within functional limits                    Tone & Sensation:   Tone: Normal              Sensation: Impaired(in L hand- 4 fingers)               Coordination:  Coordination: Within functional limits  Vision:   Tracking: Able to track stimulus in all quadrants w/o difficulty  Acuity: Impaired near vision; Impaired far vision  Corrective Lenses: Glasses  Functional Mobility:  Bed Mobility:  Rolling: Independent  Supine to Sit: Independent  Sit to Supine: Independent  Scooting: Independent  Transfers:  Sit to Stand: Supervision  Stand to Sit: Supervision        Bed to Chair: Supervision              Balance:   Sitting: Intact; Without support  Standing: Intact; With support  Standing - Static: Good  Standing - Dynamic : Good  Ambulation/Gait Training:  Distance (ft): 250 Feet (ft)  Assistive Device: Gait belt;Cane, straight  Ambulation - Level of Assistance: Modified independent     Gait Description (WDL): Exceptions to WDL  Gait Abnormalities: Decreased step clearance              Speed/Mile: Pace decreased (<100 feet/min)  Step Length: Left shortened;Right shortened                     Stairs:  Number of Stairs Trained: 5  Stairs - Level of Assistance: Supervision   Rail Use: Left       Functional Measure  Vazquez Balance Test:    Sitting to Standin  Standing Unsupported: 4  Sitting with Back Unsupported: 4  Standing to Sittin  Transfers: 4  Standing Unsupported with Eyes Closed: 3  Standing Unsupported with Feet Together: 4  Reach Forward with Outstretched Arm: 4   Object: 4  Turn to Look Over Shoulders: 4  Turn 360 Degrees: 4  Alternate Foot on Step/Stool: 3  Standing Unsupported One Foot in Front: 2  Stand on One Le  Total: 49/56         56=Maximum possible score;   0-20=High fall risk  21-40=Moderate fall risk   41-56=Low fall risk        Physical Therapy Evaluation Charge Determination   History Examination Presentation Decision-Making   MEDIUM  Complexity : 1-2 comorbidities / personal factors will impact the outcome/ POC  MEDIUM Complexity : 3 Standardized tests and measures addressing body structure, function, activity limitation and / or participation in recreation  MEDIUM Complexity : Evolving with changing characteristics  Other outcome measures Vazquez  LOW       Based on the above components, the patient evaluation is determined to be of the following complexity level: LOW       Activity Tolerance:   WNL and Good  Please refer to the flowsheet for vital signs taken during this treatment. After treatment patient left in no apparent distress:   Supine in bed, Call bell within reach and Caregiver / family present    COMMUNICATION/EDUCATION:   The patients plan of care was discussed with: Occupational therapist, Registered nurse and Case management. Patient was educated regarding Her deficit(s) of L hand numbness and headache as this relates to Her diagnosis of CVA workup. She demonstrated Good understanding as evidenced by verbal feedback. Patient and/or family was verbally educated on the BE FAST acronym for signs/symptoms of CVA and TIA. BE FAST was written on patient's communication board  for visual education and reinforcement. All questions answered with patient indicating good understanding. Fall prevention education was provided and the patient/caregiver indicated understanding., Patient/family have participated as able in goal setting and plan of care. and Patient/family agree to work toward stated goals and plan of care.     Thank you for this referral.  Aure Izquierdo, PT, DPT   Time Calculation: 23 mins

## 2020-07-23 NOTE — PROGRESS NOTES
Orders received, chart reviewed and patient evaluated by occupational therapy. Pending progression with skilled acute occupational therapy, recommend:  Outpatient occupational therapy follow up recommended for coordination and sensation deficits MAYA     Recommend with nursing patient to complete as able in order to maintain strength, endurance and independence: OOB to chair 3x/day with SBA and functional mobility to the bathroom with SBA and SPC. Thank you for your assistance. Full evaluation to follow.

## 2020-07-23 NOTE — PROGRESS NOTES
SPEECH PATHOLOGY BEDSIDE SWALLOW EVALUATION/DISCHARGE  Patient: Nara Martines (1 y.o. female)  Date: 7/23/2020  Primary Diagnosis: Stroke (cerebrum) ([facility] [address] [I63.9]       Precautions:       ASSESSMENT :  Patient with no oral or pharyngeal dysphagia. Timely and complete mastication of solids. Pharyngeal swallow initiation is timely and hyolaryngeal elevation/excursion functional via palpation. No overt s/s aspiration with any consistency. Patient with clear, fluent speech. She reports only a brief episode of slurred speech. She demonstrated great insight into deficits. She denies any changes with thinking/memory. Skilled acute therapy provided by a speech-language pathologist is not indicated at this time. PLAN :  Recommendations:  Regular/thin  Discharge Recommendations: None     SUBJECTIVE:   Patient stated I ate, took my miralax so I am good.     OBJECTIVE:     Past Medical History:   Diagnosis Date    Arthritis     Breast cyst     Depressive disorder, not elsewhere classified 6/17/2013    Fibromyalgia 6/17/2013    Hypertension     Menopause     Mixed hyperlipidemia 6/17/2013    Type II or unspecified type diabetes mellitus without mention of complication, not stated as uncontrolled 6/17/2013    Unspecified vitamin D deficiency 6/17/2013     Past Surgical History:   Procedure Laterality Date    ABDOMEN SURGERY PROC UNLISTED  6/2013    Colon resection    HX BREAST BIOPSY Left     cyst removal    HX CHOLECYSTECTOMY      HX COLONOSCOPY  7/28/14    HX HYSTERECTOMY      HX MOHS PROCEDURES      HX OOPHORECTOMY      HX OTHER SURGICAL      cyst removed from spine     Prior Level of Function/Home Situation:   Home Situation  Home Environment: Private residence  # Steps to Enter: 6  Rails to Enter: Yes  [employer] : Bilateral(spaced far apart)  One/Two Story Residence: One story  Living Alone: Yes  Support Systems: Child(lizzette)(kids come by and visit frequently)  Patient Expects to be Discharged to[de-identified] Private residence  Current DME Used/Available at Home: Shower chair, Cane, straight  Tub or Shower Type: Tub/Shower combination  Diet prior to admission: regular/thin  Current Diet:  Regular/thin   Cognitive and Communication Status:  Neurologic State: Alert  Orientation Level: Oriented X4  Cognition: Follows commands  Perception: Appears intact  Perseveration: No perseveration noted  Safety/Judgement: Awareness of environment  Oral Assessment:  Oral Assessment  Labial: No impairment  Oral Hygiene: wfl  Lingual: No impairment  Velum: Unable to visualize  Mandible: No impairment  P.O. Trials:  Patient Position: (up in bed)  Vocal quality prior to P.O.: No impairment  Consistency Presented: Thin liquid; Solid;Puree  How Presented: Successive swallows;Straw     Bolus Acceptance: No impairment  Bolus Formation/Control: No impairment     Propulsion: No impairment  Oral Residue: None  Initiation of Swallow: No impairment  Laryngeal Elevation: Functional  Aspiration Signs/Symptoms: None  Pharyngeal Phase Characteristics: No impairment, issues, or problems   Effective Modifications: None  Cues for Modifications: None       Oral Phase Severity: No impairment  Pharyngeal Phase Severity : No impairment  NOMS:   The NOMS functional outcome measure was used to quantify this patient's level of swallowing impairment. Based on the NOMS, the patient was determined to be at level 7 for swallow function     NOMS Swallowing Levels:  Level 1 (CN): NPO  Level 2 (CM): NPO but takes consistency in therapy  Level 3 (CL): Takes less than 50% of nutrition p.o. and continues with nonoral feedings; and/or safe with mod cues; and/or max diet restriction  Level 4 (CK):  Safe swallow but needs mod cues; and/or mod diet restriction; and/or still requires some nonoral feeding/supplements  Level 5 (CJ): Safe swallow with min diet restriction; and/or needs min cues  Level 6 (CI): Independent with p.o.; rare cues; usually self cues; may need to avoid some foods or needs extra time  Level 7 [facility]): Independent for all p.o.  JUSTINA. (2003). National Outcomes Measurement System (NOMS): Adult Speech-Language Pathology User's Guide. Pain:  Pain Scale 1: Numeric (0 - 10)        After treatment:   Patient left in no apparent distress sitting up in chair, Call bell within reach and Nursing notified    COMMUNICATION/EDUCATION:   Patient was educated regarding her deficit(s) of possible dysphagia as this relates to her diagnosis of cva. She demonstrated Excellent understanding as evidenced by verbalization. The patient's plan of care including recommendations, planned interventions, and recommended diet changes were discussed with: Registered nurse.      Thank you for this referral.  Ken Hercules SLP  Time Calculation: 13 mins

## 2020-07-23 NOTE — PROGRESS NOTES
REUBEN:  1) Home w/HH (PT, OT) vs OP PT  2) 2nd IM letter at d/c  3) Blank AMD with discharge papers  4) Care Card application  5) Daughter to transport at d/c    Reason for Admission:   Stroke                   RUR Score:   11 LOW                  Plan for utilizing home health:    Pt may discharge with [facility]    PCP: First and Last name:  Kal Balderrama or Tyron Thomason   Name of Practice: [id number] [facility]   Are you a current patient: Yes/No: Yes   Approximate date of last visit: 7-13-20   Can you participate in a virtual visit with your PCP: Yes                    Current Advanced Directive/Advance Care Plan: None on file. Pt would like blank AMD                         Transition of Care Plan:                      Pt is a 76year old  female admitted with stroke. Pt was alert and oriented when speaking to CM. Pt verified demographics, emergency contact, and PCP. Pt lives alone in a single story house with 6 steps to get in. Pt is independent with ADLs and uses a cane. Pt drives herself but her daughter Candido Sharma will pick her up at discharge. Pt has not had HH or SNF in the past. Pt uses the [address] in [place] or the pharmacy at the [facility] and sometimes has trouble paying for her medication. Pt has good support with her children and grandchildren and did not identify any concerns for discharge at this time. Case management consult noted. Pt will likely discharge home with OP PT but she may want [facility] services. Floor CM will follow up on blank AMD and Care Card application. CM will continue to follow up and assist with any needs she may have. Care Management Interventions  PCP Verified by CM: Yes  Mode of Transport at Discharge:  Other (see comment)(daughter)  Transition of Care Consult (CM Consult): Discharge Planning  Discharge Durable Medical Equipment: No  Health Maintenance Reviewed: Yes  Physical Therapy Consult: Yes  Occupational Therapy Consult: Yes  Speech Therapy Consult: Yes  Current Support Network: Own Home, Lives Alone  Confirm Follow Up Transport: Self  The Patient and/or Patient Representative was Provided with a Choice of Provider and Agrees with the Discharge Plan?: No  Freedom of Choice List was Provided with Basic Dialogue that Supports the Patient's Individualized Plan of Care/Goals, Treatment Preferences and Shares the Quality Data Associated with the Providers?: No  [facility] Resource Information Provided?: No    Talitha Schaumann, RN ,PAULO  [facility]  [phone]

## 2020-07-23 NOTE — CONSULTS
Date of Consultation:  July 23, 2020    Referring Physician: Steffi Zaidi MD     Reason for Consultation:  Stroke     Chief Complaint   Patient presents with    Neck Pain     Ambulatory into the ED with c/o nausea, non-traumatic neck pain, and Lt hand numbness (denies numbness in her arm).  Hand Pain    Nausea       History of Present Illness:   Gino Baer is a 76 y.o. female with hx of hypertension, hyperlipidemia and diabetes who is admitted tot the hospital after she was found to have a stroke in the right MCA territory. Patient reports that she was at her baseline state of health and then developed started developing pain along the right side of her neck and behind the ear. He also reports that she had tingling and numbness in the left hand. As her symptoms worsen and she was unable to sleep she was brought to the emergency room for evaluation. Patient does report that she has previously had symptoms of slurred speech that lasted a few seconds in the last month as well as left hand weakness again transient in nature. She did not seek medical attention for the symptoms. While in the emergency room patient underwent an MRI of the brain which showed areas of diffusion restriction within the right frontoparietal lobe. He also underwent a CTA head and neck which showed no large vessel occlusion but areas of multifocal stenosis in the intracerebral vasculature. This morning patient reports that she is doing well however still does have some neck stiffness and tightness on the right side.   Additionally she does have some tingling in her left upper extremity however this is mostly resolved    Past Medical History:   Diagnosis Date    Arthritis     Breast cyst     Depressive disorder, not elsewhere classified 6/17/2013    Fibromyalgia 6/17/2013    Hypertension     Menopause     Mixed hyperlipidemia 6/17/2013    Type II or unspecified type diabetes mellitus without mention of complication, not stated as uncontrolled 6/17/2013    Unspecified vitamin D deficiency 6/17/2013        Past Surgical History:   Procedure Laterality Date    ABDOMEN SURGERY PROC UNLISTED  6/2013    Colon resection    HX BREAST BIOPSY Left     cyst removal    HX CHOLECYSTECTOMY      HX COLONOSCOPY  7/28/14    HX HYSTERECTOMY      HX MOHS PROCEDURES      HX OOPHORECTOMY      HX OTHER SURGICAL      cyst removed from spine        Family History   Problem Relation Age of Onset    Diabetes Mother     Hypertension Mother     Kidney Disease Mother     Diabetes Father     Heart Disease Father     Hypertension Sister     Stroke Sister     Heart Disease Sister     Hypertension Sister     Hypertension Brother     Hypertension Sister         Social History     Tobacco Use    Smoking status: Former Smoker    Tobacco comment: quit 2010   Substance Use Topics    Alcohol use: No        Allergies   Allergen Reactions    Bees [Hymenoptera Allergenic Extract] Nausea Only    Menthol Chest Rub [Camph-Eucalypt-Men-Turp-Pet] Rash    Percocet [Oxycodone-Acetaminophen] Unknown (comments)        Prior to Admission medications    Medication Sig Start Date End Date Taking? Authorizing Provider   metFORMIN (GLUCOPHAGE) 1,000 mg tablet Take 1,000 mg by mouth two (2) times daily (with meals). Yes Provider, Historical   losartan (COZAAR) 100 mg tablet Take 100 mg by mouth daily. Yes Provider, Historical   SITagliptin (Januvia) 100 mg tablet Take 100 mg by mouth daily. Yes Provider, Historical   baclofen (LIORESAL) 10 mg tablet Take 5 mg by mouth three (3) times daily as needed for Muscle Spasm(s). Yes Provider, Historical   amLODIPine (NORVASC) 10 mg tablet Take 1 Tab by mouth daily. 11/19/15  Yes Mauricio Rivera NP   atorvastatin (LIPITOR) 10 mg tablet TAKE ONE TABLET BY MOUTH ONCE DAILY  Patient taking differently: 20 mg.  TAKE ONE TABLET BY MOUTH ONCE DAILY 11/17/15  Yes Mauricio Rivera NP   Nebulizer Accessories kit Use as directed. 10/1/15  Yes Mary Guan NP   glucose blood VI test strips (ASCENSIA AUTODISC VI, ONE TOUCH ULTRA TEST VI) strip Test blood sugar three time daily. 6/29/15  Yes Mary Guan NP   acetaminophen (TYLENOL EXTRA STRENGTH) 500 mg tablet Take  by mouth every six (6) hours as needed for Pain. Yes Provider, Historical   ALPRAZolam (XANAX) 1 mg tablet Take 1 Tab by mouth two (2) times daily as needed for Anxiety. Max Daily Amount: 2 mg. Indications: ANXIETY WITH DEPRESSION 1/19/16   Mary Guan NP   albuterol [facility] HFA) 90 mcg/actuation inhaler Take 1 puff by inhalation every four (4) hours as needed for Wheezing or Shortness of Breath. 12/5/14   Mary Guan NP   cholecalciferol, vitamin D3, (VITAMIN D3) 2,000 unit tab Take  by mouth daily. Provider, Historical   0.9 % SODIUM CHLORIDE (NASAL MIST NA) by Nasal route. Provider, Historical   albuterol (PROVENTIL VENTOLIN) 2.5 mg /3 mL (0.083 %) nebulizer solution 3 mL by Nebulization route every four (4) hours as needed for Wheezing.  11/20/14   KAUR Robles       Review of Systems:    General, constitutional: negative  Eyes, vision: negative  Ears, nose, throat: negative  Cardiovascular, heart: negative  Respiratory: negative  Gastrointestinal: negative  Genitourinary: negative  Musculoskeletal: negative  Skin and integumentary: negative  Psychiatric: negative  Endocrine: negative  Neurological: negative, except for HPI  Hematologic/lymphatic: negative  Allergy/immunology: negative    PHYSICAL EXAMINATION:   Visit Vitals  BP (!) 141/99 (BP 1 Location: Left arm, BP Patient Position: Sitting)   Pulse 82   Temp 98 °F (36.7 °C)   Resp 16   Ht 5' 1\" (1.549 m)   Wt 147 lb 7.8 oz (66.9 kg)   SpO2 98%   BMI 27.87 kg/m²       Physical Exam:  General:  no acute distress  Neck: no carotid bruits  Lungs: clear to auscultation  Heart:  no murmurs, regular rate and rhythm   Lower extremity: no edema    Neurological exam:  Mental Status: Awake, alert, oriented to person, place and time  Attention and Concentration: able to state the days of the week backwards   Speech and Language: No dysarthria. Able to name, repeat and follow commands   Fund of knowledge was preserved    Cranial nerves: II-XII  Pupils equal and reactive, visual fields intact by confrontation   Extraocular movements intact, no evidence of nystagmus or ptosis   Facial sensation intact   Facial movements symmetric, very slight nasolabial fold flattening on the left at rest.  Hearing intact to soft rub bilaterally   Shoulder shrug symmetric and strong   Tongue protrusion full and midline without fasciculation or atrophy    Motor:   Normal tone and Bulk   Drift: No evidence of pronator drift     Strength testing:   deltoid triceps biceps Wrist ext. Wrist flex. intrinsics   Right 5 5 5 5 5 5   Left 5 5 5 5 5 5      Hip flex. Hip ext. Knee ext. Knee flex Dorsi flex Plantar flex   Right  5 5 5 5 5 5   Left  5 5 5 5 5 5       Sensory:  Upper extremity: Incision intact to light touch and pinprick. No extinction  Lower extremity: Length dependent sensation loss to temperature in the lower extremities. Reflexes:   Biceps Triceps  Brachiorad Patellar Achilles Plantar Hoffmans   Right  2 2 2 2 2 Down Neg   Left  2 2 2 2 2 Down Neg      Cerebellar testing:  No dysmetria. Normal rapid alternating movements; finger-to-nose and heel-to- shin testing are within normal limits. Romberg: absent    Gait: Deferred due to patient safety.     LAB DATA REVIEWED:    Lab Results   Component Value Date/Time    Hemoglobin A1c 7.0 (H) 07/23/2020 03:33 AM    Sodium 135 (L) 07/23/2020 03:33 AM    Potassium 3.4 (L) 07/23/2020 03:33 AM    Chloride 101 07/23/2020 03:33 AM    Glucose 107 (H) 07/23/2020 03:33 AM    BUN 14 07/23/2020 03:33 AM    Creatinine 0.66 07/23/2020 03:33 AM    Calcium 9.1 07/23/2020 03:33 AM    WBC 8.0 07/23/2020 03:33 AM    HCT 41.2 07/23/2020 03:33 AM    HGB 13.5 07/23/2020 03:33 AM    PLATELET 955 75/32/7137 03:33 AM       Stroke workup    MRI Brain  Reviewed MRI brain which showed areas of diffusion restriction in the right frontoparietal hemisphere. These do appear to be watershed type strokes. Additionally, there was significant amount of T2 hyperintensities in the periventricular area. Also old microhemorrhages were also noted in the bilateral thalami. CTA head and neck   Independently reviewed which showed some mild stenosis at the left internal carotid artery just distal to the bifurcation. Additionally there appeared to be some mild atherosclerotic disease at the bilateral siphons stenosis of the distal right M1.    TTE:   Pending    Stroke labs:    HgBA1c    Lab Results   Component Value Date/Time    Hemoglobin A1c 7.0 (H) 07/23/2020 03:33 AM       LDL   Lab Results   Component Value Date/Time    LDL, calculated 92.2 07/23/2020 03:33 AM       EKG: Normal sinus rhythm      Assessment and Plan:   Angie Escalera is a 76 y.o. female with hx of hypertension, hyperlipidemia and diabetes who presents to the hospital with right-sided neck pain found to have a right MCA stroke. Right MCA stroke: Etiology secondary to intracranial atherosclerosis as her CTA showed right M1 stenosis. - Recommend maintaining SBP goal is less than 140/90 (this is the long term goal)   - would recommend to continue 81mg daily for secondary stroke prevention.   Add Plavix 75 mg per SAMMPRIS trial for 3 months.  - Risk factor modification: Globin A1c 7% and LDL 92.   - if LDL > 70, would start atorvastatin 40mg daily    - please check hepatic panel prior to initiation of statin  - please obtain TTE to rule out intracardiac thrombus   - would monitor on telemetry to rule out arrhythmias    - please obtain PT/OT and speech consultations     Hypertension:  - Recommend maintaining SBP goal is less than 140/90 (this is the long term goal)   - restart home medications     Diabetes: HbA1c 7.0%  - continue anti-glycemic agents. She is currently at goal for hemoglobin A1c. Hyperlipidemia: LDL 92  Continue atorvastatin 40 mg daily for an LDL goal of less than 70. We discussed extensively the importance of lifestyle modification including smoking cessation, diet, and incorporating exercise into daily routine. Thank you for the consult, will sign off. Please call with additional questions. Please have patient follow-up in neurology clinic in approximately 3 to 4 weeks.       Jacky Rodriguez MD

## 2020-07-23 NOTE — PROGRESS NOTES
Bedside and Verbal shift change report given to [address] [place] [address] (oncoming nurse) by Kolton Ayers (offgoing nurse). Report included the following information [facility]. [facility] Phone:   [phone]        Significant changes during shift: none          Patient Information    Zahra Amaya  76 y.o.  7/22/2020 10:23 AM by Marvin Ambrosio MD. Zahra Amaya was admitted from Home    Problem List    Patient Active Problem List    Diagnosis Date Noted    Stroke (cerebrum) [facility]) 07/22/2020    Sleep apnea 07/16/2015    Insomnia 01/02/2015    Essential hypertension, benign 06/17/2013    Mixed hyperlipidemia 06/17/2013    Type II diabetes mellitus ([facility] [address] 06/17/2013    Fibromyalgia 06/17/2013    Dysthymic disorder 06/17/2013    Depressive disorder, not elsewhere classified 06/17/2013    Unspecified vitamin D deficiency 06/17/2013     Past Medical History:   Diagnosis Date    Arthritis     Breast cyst     Depressive disorder, not elsewhere classified 6/17/2013    Fibromyalgia 6/17/2013    Hypertension     Menopause     Mixed hyperlipidemia 6/17/2013    Type II or unspecified type diabetes mellitus without mention of complication, not stated as uncontrolled 6/17/2013    Unspecified vitamin D deficiency 6/17/2013         Core Measures:    CVA: Yes Yes  CHF:No No  PNA:No No      Activity Status:    Stand by assist      Supplemental O2: (If Applicable)    n/a      LINES AND DRAINS:    PIV      DVT prophylaxis:  Lovenox    Wounds: (If Applicable)    N/A    Patient Safety:    Falls Score Total Score: 0  Safety Level_______  Bed Alarm On? Yes  Sitter?  No    Plan for upcoming shift: neuro, echo, pt,ot, slp        Discharge Plan: No ,ongoing    Active Consults:  IP CONSULT TO HOSPITALIST  IP CONSULT TO NEUROLOGY

## 2020-07-24 VITALS
OXYGEN SATURATION: 98 % | RESPIRATION RATE: 16 BRPM | SYSTOLIC BLOOD PRESSURE: 130 MMHG | HEART RATE: 66 BPM | DIASTOLIC BLOOD PRESSURE: 90 MMHG | BODY MASS INDEX: 27.75 KG/M2 | HEIGHT: 61 IN | TEMPERATURE: 97.9 F | WEIGHT: 147 LBS

## 2020-07-24 LAB
GLUCOSE BLD STRIP.AUTO-MCNC: 129 MG/DL (ref 65–100)
SERVICE CMNT-IMP: ABNORMAL

## 2020-07-24 PROCEDURE — 74011250637 HC RX REV CODE- 250/637: Performed by: PSYCHIATRY & NEUROLOGY

## 2020-07-24 PROCEDURE — 82962 GLUCOSE BLOOD TEST: CPT

## 2020-07-24 PROCEDURE — 74011250637 HC RX REV CODE- 250/637: Performed by: INTERNAL MEDICINE

## 2020-07-24 RX ORDER — GUAIFENESIN 100 MG/5ML
81 LIQUID (ML) ORAL DAILY
Qty: 30 TAB | Refills: 2 | Status: SHIPPED | OUTPATIENT
Start: 2020-07-25 | End: 2020-10-23

## 2020-07-24 RX ORDER — METOPROLOL TARTRATE 25 MG/1
12.5 TABLET, FILM COATED ORAL EVERY 12 HOURS
Qty: 30 TAB | Refills: 2 | Status: SHIPPED | OUTPATIENT
Start: 2020-07-24 | End: 2020-10-22

## 2020-07-24 RX ORDER — CLOPIDOGREL BISULFATE 75 MG/1
75 TABLET ORAL DAILY
Qty: 30 TAB | Refills: 2 | Status: SHIPPED | OUTPATIENT
Start: 2020-07-25 | End: 2020-10-23

## 2020-07-24 RX ORDER — CYCLOBENZAPRINE HCL 10 MG
5 TABLET ORAL
Qty: 21 TAB | Refills: 0 | Status: SHIPPED | OUTPATIENT
Start: 2020-07-24 | End: 2020-08-07

## 2020-07-24 RX ADMIN — POLYETHYLENE GLYCOL 3350 17 G: 17 POWDER, FOR SOLUTION ORAL at 08:26

## 2020-07-24 RX ADMIN — METOPROLOL TARTRATE 12.5 MG: 25 TABLET, FILM COATED ORAL at 08:24

## 2020-07-24 RX ADMIN — AMLODIPINE BESYLATE 10 MG: 5 TABLET ORAL at 08:23

## 2020-07-24 RX ADMIN — ASPIRIN 81 MG CHEWABLE TABLET 81 MG: 81 TABLET CHEWABLE at 08:23

## 2020-07-24 RX ADMIN — LOSARTAN POTASSIUM 100 MG: 50 TABLET ORAL at 08:23

## 2020-07-24 RX ADMIN — CLOPIDOGREL BISULFATE 75 MG: 75 TABLET ORAL at 08:23

## 2020-07-24 NOTE — PROGRESS NOTES
Problem: Patient Education: Go to Patient Education Activity  Goal: Patient/Family Education  Outcome: Progressing Towards Goal     Problem: TIA/CVA Stroke: 0-24 hours  Goal: Off Pathway (Use only if patient is Off Pathway)  Outcome: Progressing Towards Goal  Goal: Activity/Safety  Outcome: Progressing Towards Goal  Goal: Consults, if ordered  Outcome: Progressing Towards Goal  Goal: Diagnostic Test/Procedures  Outcome: Progressing Towards Goal  Goal: Nutrition/Diet  Outcome: Progressing Towards Goal  Goal: Discharge Planning  Outcome: Progressing Towards Goal  Goal: Medications  Outcome: Progressing Towards Goal  Goal: Respiratory  Outcome: Progressing Towards Goal  Goal: Treatments/Interventions/Procedures  Outcome: Progressing Towards Goal  Goal: Minimize risk of bleeding post-thrombolytic infusion  Outcome: Progressing Towards Goal  Goal: Monitor for complications post-thrombolytic infusion  Outcome: Progressing Towards Goal  Goal: Psychosocial  Outcome: Progressing Towards Goal  Goal: *Hemodynamically stable  Outcome: Progressing Towards Goal  Goal: *Neurologically stable  Description: Absence of additional neurological deficits    Outcome: Progressing Towards Goal  Goal: *Verbalizes anxiety and depression are reduced or absent  Outcome: Progressing Towards Goal  Goal: *Absence of Signs of Aspiration on Current Diet  Outcome: Progressing Towards Goal  Goal: *Absence of deep venous thrombosis signs and symptoms(Stroke Metric)  Outcome: Progressing Towards Goal  Goal: *Ability to perform ADLs and demonstrates progressive mobility and function  Outcome: Progressing Towards Goal  Goal: *Stroke education started(Stroke Metric)  Outcome: Progressing Towards Goal  Goal: *Dysphagia screen performed(Stroke Metric)  Outcome: Progressing Towards Goal  Goal: *Rehab consulted(Stroke Metric)  Outcome: Progressing Towards Goal     Problem: TIA/CVA Stroke: Day 2 Until Discharge  Goal: Off Pathway (Use only if patient is Off Pathway)  Outcome: Progressing Towards Goal  Goal: Activity/Safety  Outcome: Progressing Towards Goal  Goal: Diagnostic Test/Procedures  Outcome: Progressing Towards Goal  Goal: Nutrition/Diet  Outcome: Progressing Towards Goal  Goal: Discharge Planning  Outcome: Progressing Towards Goal  Goal: Medications  Outcome: Progressing Towards Goal  Goal: Respiratory  Outcome: Progressing Towards Goal  Goal: Treatments/Interventions/Procedures  Outcome: Progressing Towards Goal  Goal: Psychosocial  Outcome: Progressing Towards Goal  Goal: *Verbalizes anxiety and depression are reduced or absent  Outcome: Progressing Towards Goal  Goal: *Absence of aspiration  Outcome: Progressing Towards Goal  Goal: *Absence of deep venous thrombosis signs and symptoms(Stroke Metric)  Outcome: Progressing Towards Goal  Goal: *Optimal pain control at patient's stated goal  Outcome: Progressing Towards Goal  Goal: *Tolerating diet  Outcome: Progressing Towards Goal  Goal: *Ability to perform ADLs and demonstrates progressive mobility and function  Outcome: Progressing Towards Goal  Goal: *Stroke education continued(Stroke Metric)  Outcome: Progressing Towards Goal     Problem: Ischemic Stroke: Discharge Outcomes  Goal: *Verbalizes anxiety and depression are reduced or absent  Outcome: Progressing Towards Goal  Goal: *Verbalize understanding of risk factor modification(Stroke Metric)  Outcome: Progressing Towards Goal  Goal: *Hemodynamically stable  Outcome: Progressing Towards Goal  Goal: *Absence of aspiration pneumonia  Outcome: Progressing Towards Goal  Goal: *Aware of needed dietary changes  Outcome: Progressing Towards Goal  Goal: *Verbalize understanding of prescribed medications including anti-coagulants, anti-lipid, and/or anti-platelets(Stroke Metric)  Outcome: Progressing Towards Goal  Goal: *Tolerating diet  Outcome: Progressing Towards Goal  Goal: *Aware of follow-up diagnostics related to anticoagulants  Outcome: Progressing Towards Goal  Goal: *Ability to perform ADLs and demonstrates progressive mobility and function  Outcome: Progressing Towards Goal  Goal: *Absence of DVT(Stroke Metric)  Outcome: Progressing Towards Goal  Goal: *Absence of aspiration  Outcome: Progressing Towards Goal  Goal: *Optimal pain control at patient's stated goal  Outcome: Progressing Towards Goal  Goal: *Home safety concerns addressed  Outcome: Progressing Towards Goal  Goal: *Describes available resources and support systems  Outcome: Progressing Towards Goal  Goal: *Verbalizes understanding of activation of EMS(911) for stroke symptoms(Stroke Metric)  Outcome: Progressing Towards Goal  Goal: *Understands and describes signs and symptoms to report to providers(Stroke Metric)  Outcome: Progressing Towards Goal  Goal: *Neurolgocially stable (absence of additional neurological deficits)  Outcome: Progressing Towards Goal  Goal: *Verbalizes importance of follow-up with primary care physician(Stroke Metric)  Outcome: Progressing Towards Goal  Goal: *Smoking cessation discussed,if applicable(Stroke Metric)  Outcome: Progressing Towards Goal  Goal: *Depression screening completed(Stroke Metric)  Outcome: Progressing Towards Goal     Problem: Diabetes Self-Management  Goal: *Disease process and treatment process  Description: Define diabetes and identify own type of diabetes; list 3 options for treating diabetes. Outcome: Progressing Towards Goal  Goal: *Incorporating nutritional management into lifestyle  Description: Describe effect of type, amount and timing of food on blood glucose; list 3 methods for planning meals. Outcome: Progressing Towards Goal  Goal: *Incorporating physical activity into lifestyle  Description: State effect of exercise on blood glucose levels.   Outcome: Progressing Towards Goal  Goal: *Developing strategies to promote health/change behavior  Description: Define the ABC's of diabetes; identify appropriate screenings, schedule and personal plan for screenings. Outcome: Progressing Towards Goal  Goal: *Using medications safely  Description: State effect of diabetes medications on diabetes; name diabetes medication taking, action and side effects. Outcome: Progressing Towards Goal  Goal: *Monitoring blood glucose, interpreting and using results  Description: Identify recommended blood glucose targets  and personal targets. Outcome: Progressing Towards Goal  Goal: *Prevention, detection, treatment of acute complications  Description: List symptoms of hyper- and hypoglycemia; describe how to treat low blood sugar and actions for lowering  high blood glucose level. Outcome: Progressing Towards Goal  Goal: *Prevention, detection and treatment of chronic complications  Description: Define the natural course of diabetes and describe the relationship of blood glucose levels to long term complications of diabetes.   Outcome: Progressing Towards Goal  Goal: *Developing strategies to address psychosocial issues  Description: Describe feelings about living with diabetes; identify support needed and support network  Outcome: Progressing Towards Goal  Goal: *Insulin pump training  Outcome: Progressing Towards Goal  Goal: *Sick day guidelines  Outcome: Progressing Towards Goal  Goal: *Patient Specific Goal (EDIT GOAL, INSERT TEXT)  Outcome: Progressing Towards Goal     Problem: Patient Education: Go to Patient Education Activity  Goal: Patient/Family Education  Outcome: Progressing Towards Goal

## 2020-07-24 NOTE — PROGRESS NOTES
Discharge instructions including changes in medications and follow  up appointments reviewed with patient and daughter. They verbalized understanding. Discharge by volunteer via [facility]

## 2020-07-24 NOTE — PROGRESS NOTES
REUBEN Plan:    * Home with f/u apts, DME (shower chair) provided by [facility] supply, order for out-pt therapy    > [facility] supply to deliver requested DME (shower chair) to pt's home  > CM confirmed pt has PCP f/u apt secured for d/c; details reflected in AVS   > CM provided blank copy of AMD for review per pt's request  > CM provided Care Card application per request  > Pt's daughter to provide transportation at d/c    12:26 PM: CM confirmed [address] is able to provide requested DME (shower chair) to pt's home. No further CM needs identified. CM notified pt's nurse of d/c. Initial note: CM acknowledged d/c. CM reviewed pt's chart and noted updates prior to moving forward with d/c planning. CM met with pt at bedside to check in and discuss REUBEN plan for d/c re: home with out-pt therapy. Pt confirmed being agreeable to d/c plan. Pt requested for CM to assist in securing a shower chair for d/c; CM will work to process DME request & will report updates as they become available. CM provided pt with Care Care application as well as blank copy of AMD per request. Pt's daughter will provide transportation at d/c. Medicare pt has received, reviewed, and signed 2nd IM letter informing them of their right to appeal the discharge. Signed copy has been placed on pt bedside chart. Care Management Interventions  PCP Verified by CM: Yes  Mode of Transport at Discharge: Other (see comment)(pt's daughter to provide transportation at d/c)  Transition of Care Consult (CM Consult):  Other, Discharge Planning(f/u apts & out-pt therapy)  Discharge Durable Medical Equipment: Yes(DME (shower chair) to be delivered by [facility])  Health Maintenance Reviewed: Yes  Physical Therapy Consult: Yes  Occupational Therapy Consult: Yes  Speech Therapy Consult: Yes  Current Support Network: Own Home, Family Lives [place], Lives Alone  Confirm Follow Up Transport: Family  The Patient and/or Patient Representative was Provided with a Choice of Provider and Agrees with the Discharge Plan?: No  Freedom of Choice List was Provided with Basic Dialogue that Supports the Patient's Individualized Plan of Care/Goals, Treatment Preferences and Shares the Quality Data Associated with the Providers?: No   Resource Information Provided?: No  Discharge Location  Discharge Placement: Home with outpatient services(f/u apts, DME (shower chair), & out-pt therapy)    Lavelle Keenan, [address] [place] [address], [address] [place] [address]

## 2020-07-24 NOTE — PROGRESS NOTES
1900--bedside report received from [address] [place] [address], rn  Pt resting in bed oriented x 4, denies pain, nausea, call bell in reach assessment as noted    2200--rn xanax given at pt request, call bell in reach assessment as noted    0400--pt resting quietly, has no complaints at this time    0700--bedside report given to napoleon reynoso who is assuming care of pt

## 2020-07-24 NOTE — DISCHARGE SUMMARY
Hospitalist Discharge Summary     Patient ID:  Dorota Clay  [id number]  76 y.o.  1955 7/22/2020    PCP on record: Cornelius Vargas MD    Admit date: 7/22/2020  Discharge date and time: 7/24/2020    DISCHARGE DIAGNOSIS:  Acute CVA  Neck pain  hypokalemia  HTN  Anxiety/depression  T2DM      CONSULTATIONS:  IP CONSULT TO HOSPITALIST  IP CONSULT TO NEUROLOGY    Excerpted HPI from H&P of DR HUSSEIN :  Ibeth Waters is a 71 y.o.   female who presents to the ER complaining of numbness and weakness to her L hand.  Symptoms started \"on a Sunday\".  Pt states she came in today because she got to the point where she can't sleep due to pain in R side of her neck, L hand numbness and tingling.  She also had some difficulty with slurred speech about 2 weeks ago while she was in the heat, improved when she cooled down.  She denies any difficulty with ambulation, weakness in her legs.  She currently has a headache and nausea, but no vomiting.  Denies any fever, chills, cp, sob, palpitations.    Vitals/labs/images reviewed  In the ER, pt had an MRI head with multiple small acute infacrcts in the R frontoparietal white matter.     We were asked to admit for work up and evaluation of the above problems.              ______________________________________________________________________  DISCHARGE SUMMARY/HOSPITAL COURSE:  for full details see H&P, daily progress notes, labs, consult notes. Acute CVA  Neck pain  CT head -No evidence of acute intracranial abnormality.  Moderate chronic microvascular ischemic disease.   MRI head- Multiple small acute infarcts in the right frontoparietal white matter.  Moderate chronic microvascular ischemic disease. Numerous chronic microhemorrhages in the bilateral thalami, and few additional scattered supratentorial and infratentorial chronic microhemorrhages as above.    CTA Head-   No evidence of flow-limiting stenosis.  Multifocal atherosclerotic disease as above, including severe stenosis of the left P2 segment, moderate stenosis of the distal right M1 segment and moderate stenosis of the bilateral M2 segments.  A 2 mm right posterior communicating artery infundibulum versus aneurysm.   CTA Neck-  No evidence of flow-limiting stenosis.  Mild stenosis (less than 50% by NASCET criteria) of the proximal left internal carotid artery. Check Echo, labs: TSH wnl ,  A1C 7% . LDL 92  flexeril prn  C/w  ASA/ plavix  and statin.  .  Neurology consulted  PT/OT recommended OP PT      Hypokalemia repleted   HTN  Cont' losartan and amlodipine, titrate prn. C/w iV hydralazine.   add metoprolol as SBP > 160   Replete K   Anxiety/depression  Cont' home xanax prn  Denies SI/HI     T2DM  On metformin and Januvia at home, will hold  SSI   ______________________________________________________________________  Patient seen and examined by me on discharge day. Pertinent Findings:  Gen:    Not in distress  Chest: Clear lungs  CVS:   Regular rhythm. No edema  Abd:  Soft, not distended, not tender  Neuro:  Alert, orientedx4  _______________________________________________________________________  DISCHARGE MEDICATIONS:   Current Discharge Medication List      START taking these medications    Details   aspirin 81 mg chewable tablet Take 1 Tab by mouth daily for 90 days. Qty: 30 Tab, Refills: 2      clopidogreL (PLAVIX) 75 mg tab Take 1 Tab by mouth daily for 90 days. Qty: 30 Tab, Refills: 2      metoprolol tartrate (LOPRESSOR) 25 mg tablet Take 0.5 Tabs by mouth every twelve (12) hours for 90 days. Qty: 30 Tab, Refills: 2      OTHER Outpatient physical therapy twice a week for 3mois   To be renewed by pcp  Qty: 1 Act, Refills: 0         CONTINUE these medications which have CHANGED    Details   cyclobenzaprine (FLEXERIL) 10 mg tablet Take 0.5 Tabs by mouth three (3) times daily as needed for Muscle Spasm(s) for up to 14 days.   Qty: 21 Tab, Refills: 0         CONTINUE these medications which have NOT CHANGED    Details   metFORMIN (GLUCOPHAGE) 1,000 mg tablet Take 1,000 mg by mouth two (2) times daily (with meals). losartan (COZAAR) 100 mg tablet Take 100 mg by mouth daily. SITagliptin (Januvia) 100 mg tablet Take 100 mg by mouth daily. baclofen (LIORESAL) 10 mg tablet Take 5 mg by mouth three (3) times daily as needed for Muscle Spasm(s). amLODIPine (NORVASC) 10 mg tablet Take 1 Tab by mouth daily. Qty: 90 Tab, Refills: 1    Associated Diagnoses: Essential hypertension, benign      atorvastatin (LIPITOR) 10 mg tablet TAKE ONE TABLET BY MOUTH ONCE DAILY  Qty: 90 Tab, Refills: 1      Nebulizer Accessories kit Use as directed. Qty: 1 Kit, Refills: 0      glucose blood VI test strips (ASCENSIA AUTODISC VI, ONE TOUCH ULTRA TEST VI) strip Test blood sugar three time daily. Qty: 90 Each, Refills: 2      acetaminophen (TYLENOL EXTRA STRENGTH) 500 mg tablet Take  by mouth every six (6) hours as needed for Pain. ALPRAZolam (XANAX) 1 mg tablet Take 1 Tab by mouth two (2) times daily as needed for Anxiety. Max Daily Amount: 2 mg. Indications: ANXIETY WITH DEPRESSION  Qty: 30 Tab, Refills: 0    Associated Diagnoses: Depression      albuterol (PROAIR HFA) 90 mcg/actuation inhaler Take 1 puff by inhalation every four (4) hours as needed for Wheezing or Shortness of Breath. Qty: 1 Inhaler, Refills: 2    Associated Diagnoses: Asthma, mild intermittent, uncomplicated      cholecalciferol, vitamin D3, (VITAMIN D3) 2,000 unit tab Take  by mouth daily. 0.9 % SODIUM CHLORIDE (NASAL MIST NA) by Nasal route. albuterol (PROVENTIL VENTOLIN) 2.5 mg /3 mL (0.083 %) nebulizer solution 3 mL by Nebulization route every four (4) hours as needed for Wheezing.   Qty: 1 Package, Refills: 4    Associated Diagnoses: Bronchospasm, acute         STOP taking these medications       traMADol (ULTRAM) 50 mg tablet Comments:   Reason for Stopping:                 Patient Follow Up Instructions:    Activity: Activity as tolerated  Diet: Cardiac Diet  Wound Care: None needed    Follow-up Information     Follow up With Specialties Details Why Contact Info    Renee Brunson MD Internal Medicine Schedule an appointment as soon as possible for a visit  [facility]  [phone]      [facility] EMERGENCY DEPT Emergency Medicine  If symptoms worsen [address] [facility] [place] [address]    Renee Brunson MD Internal Medicine Go on 8/6/2020 For PCP hospital follow up appointment at 9:40AM  [facility]  [phone]          ________________________________________________________________    Risk of deterioration: Low    Condition at Discharge:  Stable  __________________________________________________________________    Disposition  outpatient PT     ____________________________________________________________________    Code Status: Full Code  ___________________________________________________________________      Total time in minutes spent coordinating this discharge (includes going over instructions, follow-up, prescriptions, and preparing report for sign off to her PCP) :  35 minutes    Signed:  Panda Blackwood MD

## 2020-07-24 NOTE — DISCHARGE INSTRUCTIONS
Patient Education        Neck Pain: Care Instructions  Your Care Instructions    You can have neck pain anywhere from the bottom of your head to the top of your shoulders. It can spread to the upper back or arms. Injuries, painting a ceiling, sleeping with your neck twisted, staying in one position for too long, and many other activities can cause neck pain. Most neck pain gets better with home care. Your doctor may recommend medicine to relieve pain or relax your muscles. He or she may suggest exercise and physical therapy to increase flexibility and relieve stress. You may need to wear a special (cervical) collar to support your neck for a day or two. Follow-up care is a key part of your treatment and safety. Be sure to make and go to all appointments, and call your doctor if you are having problems. It's also a good idea to know your test results and keep a list of the medicines you take. How can you care for yourself at home? · Try using a heating pad on a low or medium setting for 15 to 20 minutes every 2 or 3 hours. Try a warm shower in place of one session with the heating pad. · You can also try an ice pack for 10 to 15 minutes every 2 to 3 hours. Put a thin cloth between the ice and your skin. · Take pain medicines exactly as directed. ¨ If the doctor gave you a prescription medicine for pain, take it as prescribed. ¨ If you are not taking a prescription pain medicine, ask your doctor if you can take an over-the-counter medicine. · If your doctor recommends a cervical collar, wear it exactly as directed. When should you call for help? Call your doctor now or seek immediate medical care if:  ? · You have new or worsening numbness in your arms, buttocks or legs. ? · You have new or worsening weakness in your arms or legs. (This could make it hard to stand up.)   ? · You lose control of your bladder or bowels. ? Watch closely for changes in your health, and be sure to contact your doctor if:  ? · Your neck pain is getting worse. ? · You are not getting better after 1 week. ? · You do not get better as expected. Where can you learn more? Go to http://manish-anthony.info/. Enter [id number] in the search box to learn more about \"Neck Pain: Care Instructions. \"  Current as of: March 21, 2017  Content Version: 11.5  © 3206-1151 [employer]. Care instructions adapted under license by [employer] (which disclaims liability or warranty for this information). If you have questions about a medical condition or this instruction, always ask your healthcare professional. [address] any warranty or liability for your use of this information. Patient Education        Torticollis: Care Instructions  Your Care Instructions  Torticollis is a severe tightness of the muscles on one side of the neck. The tight muscles can make the head turn to one side, lean to one side, or be pulled forward or backward. It is also called wryneck. Your doctor asked questions about your health and examined you. You may also have had X-rays or other tests. If your doctor thinks another medical problem is causing your tight neck muscles, you may need more tests. Torticollis usually gets better with home care. Your doctor may have you take medicine to relieve pain or relax your muscles. He or she may suggest exercise and physical therapy to help increase flexibility and relieve stress. Your doctor may also have you wear a special collar, called a cervical collar, for a day or two. The collar may help make your neck more comfortable. Follow-up care is a key part of your treatment and safety. Be sure to make and go to all appointments, and call your doctor if you are having problems. It's also a good idea to know your test results and keep a list of the medicines you take. How can you care for yourself at home? · Be safe with medicines.  Read and follow all instructions on the label.  ? If the doctor gave you a prescription medicine for pain, take it as prescribed. ? If you are not taking a prescription pain medicine, ask your doctor if you can take an over-the-counter medicine. · Try using a heating pad on a low or medium setting for 15 to 20 minutes every 2 or 3 hours. Try a warm shower in place of one session with the heating pad. · Try using an ice pack for 10 to 15 minutes every 2 to 3 hours. Put a thin cloth between the ice and your skin. · If your doctor recommends a cervical collar, wear it exactly as directed. When should you call for help? Call your doctor now or seek immediate medical care if:  · You have new or worse numbness in your arms, buttocks, or legs. · You have new or worse weakness in your arms or legs. · Your neck pain gets worse. · You lose bladder or bowel control. Watch closely for changes in your health, and be sure to contact your doctor if:  · You do not get better as expected. Where can you learn more? Go to http://manish-anthony.info/  Enter V110 in the search box to learn more about \"Torticollis: Care Instructions. \"  Current as of: March 2, 2020               Content Version: 12.5  © 8758-4753 [employer]. Care instructions adapted under license by [employer] (which disclaims liability or warranty for this information). If you have questions about a medical condition or this instruction, always ask your healthcare professional. [place] [address] any warranty or liability for your use of this information. DISCHARGE DIAGNOSIS:  Acute CVA  Neck pain  hypokalemia  HTN  Anxiety/depression  T2DM  MEDICATIONS:  · It is important that you take the medication exactly as they are prescribed. · Keep your medication in the bottles provided by the pharmacist and keep a list of the medication names, dosages, and times to be taken in your wallet.    · Do not take other medications without consulting your doctor. Pain Management: per above medications    What to do at [address]:  Cardiac Diet    Recommended activity: Activity as tolerated    If you have questions regarding the hospital related prescriptions or hospital related issues please call [address] [place] [address] at . You can always direct your questions to your primary care doctor if you are unable to reach your hospital physician; your PCP works as an extension of your hospital doctor just like your hospital doctor is an extension of your PCP for your time at the hospital [facility]).     If you experience any of the following symptoms then please call your primary care physician or return to the emergency room if you cannot get hold of your doctor:  Fever, chills, nausea, vomiting, diarrhea, change in mentation, falling, bleeding, shortness of breath

## 2020-07-25 ENCOUNTER — PATIENT OUTREACH (OUTPATIENT)
Dept: FAMILY MEDICINE CLINIC | Age: 65
End: 2020-07-25

## 2020-07-27 NOTE — PROGRESS NOTES
Patient contacted regarding recent discharge and COVID-19 risk. Discussed COVID-19 related testing which was not done at this time. Test results were not done. Patient informed of results, if available? n/a    Care Transition Nurse/ Ambulatory Care Manager contacted the patient by telephone to perform post discharge assessment. Verified name and  with patient as identifiers. Patient has following risk factors of: diabetes. CTN/ACM reviewed discharge instructions, medical action plan and red flags related to discharge diagnosis. Reviewed and educated them on any new and changed medications related to discharge diagnosis. Advised obtaining a 90-day supply of all daily and as-needed medications. Advance Care Planning:   Does patient have an Advance Directive: not on file; education provided     Education provided regarding infection prevention, and signs and symptoms of COVID-19 and when to seek medical attention with patient who verbalized understanding. Discussed exposure protocols and quarantine from [address] you at higher risk for severe illness  and given an opportunity for questions and concerns. The patient agrees to contact the COVID-19 hotline [phone] or PCP office for questions related to their healthcare. CTN/ACM provided contact information for future reference. From CDC: Are you at higher risk for severe illness?  Wash your hands often.  Avoid close contact (6 feet, which is about two arm lengths) with people who are sick.  Put distance between yourself and other people if COVID-19 is spreading in your community.  Clean and disinfect frequently touched surfaces.  Avoid all cruise travel and non-essential air travel.  Call your healthcare professional if you have concerns about COVID-19 and your underlying condition or if you are sick.     For more information on steps you can take to protect yourself, see CDC's How to Protect Yourself Patient/family/caregiver given information for [employer] and agrees to enroll no  Patient's preferred e-mail:  n/a  Patient's preferred phone number: n/a  Based on Loop alert triggers, patient will be contacted by nurse care manager for worsening symptoms. Plan for follow-up call in 7-14 days based on severity of symptoms and risk factors.

## 2020-08-03 ENCOUNTER — OFFICE VISIT (OUTPATIENT)
Dept: PRIMARY CARE CLINIC | Age: 65
End: 2020-08-03

## 2020-08-03 DIAGNOSIS — Z20.828 EXPOSURE TO SARS-ASSOCIATED CORONAVIRUS: Primary | ICD-10-CM

## 2020-08-05 LAB — SARS-COV-2, NAA: NOT DETECTED

## 2020-08-14 ENCOUNTER — PATIENT OUTREACH (OUTPATIENT)
Dept: FAMILY MEDICINE CLINIC | Age: 65
End: 2020-08-14

## 2020-08-14 NOTE — PROGRESS NOTES
Patient resolved from Transition of Care episode on 8/14/20. ACM/CTN was unsuccessful at contacting this patient today. Patient/family was provided the following resources and education related to COVID-19 during the initial call:                         Signs, symptoms and red flags related to COVID-19            CDC exposure and quarantine guidelines            [facility] exposure contact - [phone]            Contact for their local Department of Health                 Patient has not had any additional ED or hospital visits. No further outreach scheduled with this CTN/ACM. Episode of Care resolved. Patient has this CTN/ACM contact information if future needs arise.

## 2020-08-24 ENCOUNTER — HOSPITAL ENCOUNTER (EMERGENCY)
Age: 65
Discharge: HOME | End: 2020-08-25
Attending: EMERGENCY MEDICINE
Payer: MEDICARE

## 2020-08-24 DIAGNOSIS — Z86.69 H/O SLEEP APNEA: ICD-10-CM

## 2020-08-24 DIAGNOSIS — R53.81 MALAISE AND FATIGUE: Primary | ICD-10-CM

## 2020-08-24 DIAGNOSIS — R53.83 MALAISE AND FATIGUE: Primary | ICD-10-CM

## 2020-08-24 DIAGNOSIS — G89.29 OTHER CHRONIC PAIN: ICD-10-CM

## 2020-08-24 DIAGNOSIS — Z86.73 HISTORY OF RECENT STROKE: ICD-10-CM

## 2020-08-24 LAB
ALBUMIN SERPL-MCNC: 3.7 G/DL (ref 3.5–5)
ALBUMIN/GLOB SERPL: 0.8 {RATIO} (ref 1.1–2.2)
ALP SERPL-CCNC: 83 U/L (ref 45–117)
ALT SERPL-CCNC: 14 U/L (ref 12–78)
ANION GAP SERPL CALC-SCNC: 3 MMOL/L (ref 5–15)
AST SERPL-CCNC: 10 U/L (ref 15–37)
BASOPHILS # BLD: 0.1 K/UL (ref 0–0.1)
BASOPHILS NFR BLD: 1 % (ref 0–1)
BILIRUB SERPL-MCNC: 0.2 MG/DL (ref 0.2–1)
BUN SERPL-MCNC: 17 MG/DL (ref 6–20)
BUN/CREAT SERPL: 21 (ref 12–20)
CALCIUM SERPL-MCNC: 9.9 MG/DL (ref 8.5–10.1)
CHLORIDE SERPL-SCNC: 99 MMOL/L (ref 97–108)
CK SERPL-CCNC: 97 U/L (ref 26–192)
CO2 SERPL-SCNC: 32 MMOL/L (ref 21–32)
CREAT SERPL-MCNC: 0.8 MG/DL (ref 0.55–1.02)
DIFFERENTIAL METHOD BLD: ABNORMAL
EOSINOPHIL # BLD: 0.1 K/UL (ref 0–0.4)
EOSINOPHIL NFR BLD: 1 % (ref 0–7)
ERYTHROCYTE [DISTWIDTH] IN BLOOD BY AUTOMATED COUNT: 12.6 % (ref 11.5–14.5)
GLOBULIN SER CALC-MCNC: 4.8 G/DL (ref 2–4)
GLUCOSE SERPL-MCNC: 143 MG/DL (ref 65–100)
HCT VFR BLD AUTO: 38.8 % (ref 35–47)
HGB BLD-MCNC: 12.6 G/DL (ref 11.5–16)
IMM GRANULOCYTES # BLD AUTO: 0 K/UL (ref 0–0.04)
IMM GRANULOCYTES NFR BLD AUTO: 0 % (ref 0–0.5)
LYMPHOCYTES # BLD: 3.3 K/UL (ref 0.8–3.5)
LYMPHOCYTES NFR BLD: 31 % (ref 12–49)
MCH RBC QN AUTO: 29.4 PG (ref 26–34)
MCHC RBC AUTO-ENTMCNC: 32.5 G/DL (ref 30–36.5)
MCV RBC AUTO: 90.7 FL (ref 80–99)
MONOCYTES # BLD: 1 K/UL (ref 0–1)
MONOCYTES NFR BLD: 9 % (ref 5–13)
NEUTS SEG # BLD: 6.3 K/UL (ref 1.8–8)
NEUTS SEG NFR BLD: 58 % (ref 32–75)
NRBC # BLD: 0 K/UL (ref 0–0.01)
NRBC BLD-RTO: 0 PER 100 WBC
PLATELET # BLD AUTO: 412 K/UL (ref 150–400)
PMV BLD AUTO: 9.9 FL (ref 8.9–12.9)
POTASSIUM SERPL-SCNC: 4.3 MMOL/L (ref 3.5–5.1)
PROT SERPL-MCNC: 8.5 G/DL (ref 6.4–8.2)
RBC # BLD AUTO: 4.28 M/UL (ref 3.8–5.2)
RBC MORPH BLD: ABNORMAL
SODIUM SERPL-SCNC: 134 MMOL/L (ref 136–145)
WBC # BLD AUTO: 10.8 K/UL (ref 3.6–11)

## 2020-08-24 PROCEDURE — 80053 COMPREHEN METABOLIC PANEL: CPT

## 2020-08-24 PROCEDURE — 36415 COLL VENOUS BLD VENIPUNCTURE: CPT

## 2020-08-24 PROCEDURE — 85025 COMPLETE CBC W/AUTO DIFF WBC: CPT

## 2020-08-24 PROCEDURE — 83880 ASSAY OF NATRIURETIC PEPTIDE: CPT

## 2020-08-24 PROCEDURE — 99285 EMERGENCY DEPT VISIT HI MDM: CPT

## 2020-08-24 PROCEDURE — 99284 EMERGENCY DEPT VISIT MOD MDM: CPT

## 2020-08-24 PROCEDURE — 93005 ELECTROCARDIOGRAM TRACING: CPT

## 2020-08-24 PROCEDURE — 82550 ASSAY OF CK (CPK): CPT

## 2020-08-25 ENCOUNTER — APPOINTMENT (OUTPATIENT)
Dept: GENERAL RADIOLOGY | Age: 65
End: 2020-08-25
Attending: EMERGENCY MEDICINE
Payer: MEDICARE

## 2020-08-25 VITALS
BODY MASS INDEX: 27.85 KG/M2 | HEIGHT: 61 IN | DIASTOLIC BLOOD PRESSURE: 87 MMHG | SYSTOLIC BLOOD PRESSURE: 128 MMHG | WEIGHT: 147.49 LBS | OXYGEN SATURATION: 93 % | TEMPERATURE: 98.5 F | RESPIRATION RATE: 14 BRPM | HEART RATE: 66 BPM

## 2020-08-25 LAB
ARTERIAL PATENCY WRIST A: ABNORMAL
ATRIAL RATE: 61 BPM
BASE EXCESS BLD CALC-SCNC: 4 MMOL/L
BDY SITE: ABNORMAL
BNP SERPL-MCNC: 112 PG/ML
CA-I BLD-SCNC: 1.3 MMOL/L (ref 1.12–1.32)
CALCULATED P AXIS, ECG09: 52 DEGREES
CALCULATED R AXIS, ECG10: 33 DEGREES
CALCULATED T AXIS, ECG11: 55 DEGREES
DIAGNOSIS, 93000: NORMAL
GAS FLOW.O2 O2 DELIVERY SYS: ABNORMAL L/MIN
HCO3 BLD-SCNC: 28 MMOL/L (ref 22–26)
P-R INTERVAL, ECG05: 182 MS
PCO2 BLD: 42.4 MMHG (ref 35–45)
PH BLD: 7.43 [PH] (ref 7.35–7.45)
PO2 BLD: 85 MMHG (ref 80–100)
Q-T INTERVAL, ECG07: 406 MS
QRS DURATION, ECG06: 88 MS
QTC CALCULATION (BEZET), ECG08: 408 MS
SAO2 % BLD: 97 % (ref 92–97)
SPECIMEN TYPE: ABNORMAL
TOTAL RESP. RATE, ITRR: 15
VENTRICULAR RATE, ECG03: 61 BPM

## 2020-08-25 PROCEDURE — 82803 BLOOD GASES ANY COMBINATION: CPT

## 2020-08-25 PROCEDURE — 36600 WITHDRAWAL OF ARTERIAL BLOOD: CPT

## 2020-08-25 PROCEDURE — 71046 X-RAY EXAM CHEST 2 VIEWS: CPT

## 2020-08-25 NOTE — ED NOTES
I have reviewed discharge instructions with the patient. The patient verbalized understanding. Pt stable. IV removed. Follow up with neurology, sleep medicine and PCP.  Ambulatory, discharged to home with daughter

## 2020-08-25 NOTE — ED PROVIDER NOTES
EMERGENCY DEPARTMENT HISTORY AND PHYSICAL EXAM     ----------------------------------------------------------------------------  Please note that this dictation was completed with Voicendo, the computer voice recognition software. Quite often unanticipated grammatical, syntax, homophones, and other interpretive errors are inadvertently transcribed by the computer software. Please disregard these errors. Please excuse any errors that have escaped final proofreading  ----------------------------------------------------------------------------      Date: 8/24/2020  Patient Name: Isra Morgan    History of Presenting Illness     Chief Complaint   Patient presents with    Shortness of Breath     Pt reports SOB x 7 days intermittently.  Chest Pain     Pt reports chest pressure x 7 days intermittently as well. History Provided By:  Patient    HPI: Isra Morgan is a 76 y.o. female, with significant pmhx of recent CVA with admission last month, diabetes, hypertension, chronic pain, who presents via private vehicle to the ED with c/o generally not feeling well over the last week. Notes that she is been having ongoing issues with her chronic pain for which she takes baclofen. Patient's family member also notes recent tweaks in her medication regime for her baclofen. Previously taking 20 mg as needed up to 3 times a day. Notes that the primary care doctor provided her with a prescription for and a tapered to slowly increase her dosage up to 50 mg 3 times a day scheduled versus as needed. Patient has not started on this taper. Denies any missed medications. Notes that she has been undergoing physical therapy since her stroke admission. Notes that her pain issues on no different than they had been previously. Notes having associated intermittent nausea, weakness, chest pressure.   Patient continues on to discuss the fact that she was previously diagnosed with sleep apnea and was supposed to receive a CPAP machine but has not received one in the last 2 years. Patient's family member notes that she is often tired throughout the day with intermittent episodes of napping. Patient also specifically denies any associated fevers, chills,  diarrhea, abd pain, changes in BM, urinary sxs, or headache. Social Hx: denies tobacco  denies EtOH , denies Illicit Drugs    There are no other complaints, changes, or physical findings at this time. PCP: Ron Cartagena MD    Allergies   Allergen Reactions    Bees [Hymenoptera Allergenic Extract] Nausea Only    Menthol Chest Rub [Camph-Eucalypt-Men-Turp-Pet] Rash    Percocet [Oxycodone-Acetaminophen] Unknown (comments)       Current Outpatient Medications   Medication Sig Dispense Refill    aspirin 81 mg chewable tablet Take 1 Tab by mouth daily for 90 days. 30 Tab 2    clopidogreL (PLAVIX) 75 mg tab Take 1 Tab by mouth daily for 90 days. 30 Tab 2    metoprolol tartrate (LOPRESSOR) 25 mg tablet Take 0.5 Tabs by mouth every twelve (12) hours for 90 days. 30 Tab 2    metFORMIN (GLUCOPHAGE) 1,000 mg tablet Take 1,000 mg by mouth two (2) times daily (with meals).  SITagliptin (Januvia) 100 mg tablet Take 100 mg by mouth daily.  baclofen (LIORESAL) 10 mg tablet Take 5 mg by mouth three (3) times daily as needed for Muscle Spasm(s).  ALPRAZolam (XANAX) 1 mg tablet Take 1 Tab by mouth two (2) times daily as needed for Anxiety. Max Daily Amount: 2 mg. Indications: ANXIETY WITH DEPRESSION 30 Tab 0    cholecalciferol, vitamin D3, (VITAMIN D3) 2,000 unit tab Take  by mouth daily.  OTHER Outpatient physical therapy twice a week for 3mois   To be renewed by pcp 1 Act 0    losartan (COZAAR) 100 mg tablet Take 100 mg by mouth daily.  amLODIPine (NORVASC) 10 mg tablet Take 1 Tab by mouth daily. 90 Tab 1    atorvastatin (LIPITOR) 10 mg tablet TAKE ONE TABLET BY MOUTH ONCE DAILY (Patient taking differently: 20 mg.  TAKE ONE TABLET BY MOUTH ONCE DAILY) 90 Tab 1    Nebulizer Accessories kit Use as directed. 1 Kit 0    glucose blood VI test strips (ASCENSIA AUTODISC VI, ONE TOUCH ULTRA TEST VI) strip Test blood sugar three time daily. 90 Each 2    acetaminophen (TYLENOL EXTRA STRENGTH) 500 mg tablet Take  by mouth every six (6) hours as needed for Pain.  albuterol (PROAIR HFA) 90 mcg/actuation inhaler Take 1 puff by inhalation every four (4) hours as needed for Wheezing or Shortness of Breath. 1 Inhaler 2    0.9 % SODIUM CHLORIDE (NASAL MIST NA) by Nasal route.  albuterol (PROVENTIL VENTOLIN) 2.5 mg /3 mL (0.083 %) nebulizer solution 3 mL by Nebulization route every four (4) hours as needed for Wheezing.  1 Package 4       Past History     Past Medical History:  Past Medical History:   Diagnosis Date    Arthritis     Breast cyst     Depressive disorder, not elsewhere classified 6/17/2013    Fibromyalgia 6/17/2013    Hypertension     Menopause     Mixed hyperlipidemia 6/17/2013    Type II or unspecified type diabetes mellitus without mention of complication, not stated as uncontrolled 6/17/2013    Unspecified vitamin D deficiency 6/17/2013       Past Surgical History:  Past Surgical History:   Procedure Laterality Date    ABDOMEN SURGERY PROC UNLISTED  6/2013    Colon resection    HX BREAST BIOPSY Left     cyst removal    HX CHOLECYSTECTOMY      HX COLONOSCOPY  7/28/14    HX HYSTERECTOMY      HX MOHS PROCEDURES      HX OOPHORECTOMY      HX OTHER SURGICAL      cyst removed from spine       Family History:  Family History   Problem Relation Age of Onset    Diabetes Mother     Hypertension Mother     Kidney Disease Mother     Diabetes Father     Heart Disease Father     Hypertension Sister     Stroke Sister     Heart Disease Sister     Hypertension Sister     Hypertension Brother     Hypertension Sister        Social History:  Social History     Tobacco Use    Smoking status: Former Smoker    Tobacco comment: quit 2010 Substance Use Topics    Alcohol use: No    Drug use: Never       Allergies: Allergies   Allergen Reactions    Bees [Hymenoptera Allergenic Extract] Nausea Only    Menthol Chest Rub [Camph-Eucalypt-Men-Turp-Pet] Rash    Percocet [Oxycodone-Acetaminophen] Unknown (comments)         Review of Systems   Review of Systems   Constitutional: Negative. Negative for fever. Eyes: Negative. Respiratory: Positive for chest tightness and shortness of breath. Cardiovascular: Negative for chest pain. Gastrointestinal: Negative for abdominal pain, nausea and vomiting. Endocrine: Negative. Genitourinary: Negative. Negative for difficulty urinating, dysuria and hematuria. Musculoskeletal: Negative. Skin: Negative. Neurological: Positive for weakness. Psychiatric/Behavioral: Positive for sleep disturbance. Negative for suicidal ideas. All other systems reviewed and are negative. Physical Exam   Physical Exam  Vitals signs and nursing note reviewed. Constitutional:       General: She is not in acute distress. Appearance: She is well-developed. She is obese. She is not diaphoretic. HENT:      Head: Normocephalic and atraumatic. Nose: Nose normal.   Eyes:      General: No scleral icterus. Conjunctiva/sclera: Conjunctivae normal.   Neck:      Musculoskeletal: Normal range of motion. Trachea: No tracheal deviation. Cardiovascular:      Rate and Rhythm: Normal rate and regular rhythm. Heart sounds: Normal heart sounds. No murmur. No friction rub. Pulmonary:      Effort: Pulmonary effort is normal. No respiratory distress. Breath sounds: Normal breath sounds. No stridor. No decreased breath sounds, wheezing or rales. Comments: Pt able to speak in full, unlabored sentences. Abdominal:      General: Bowel sounds are normal. There is no distension. Palpations: Abdomen is soft. Tenderness: There is no abdominal tenderness. There is no rebound. Musculoskeletal: Normal range of motion. General: No tenderness. Skin:     General: Skin is warm and dry. Findings: No rash. Neurological:      General: No focal deficit present. Mental Status: She is alert and oriented to person, place, and time. Cranial Nerves: No cranial nerve deficit. Psychiatric:         Speech: Speech normal.         Behavior: Behavior normal.         Thought Content: Thought content normal.         Judgment: Judgment normal.           Diagnostic Study Results     Labs -     Recent Results (from the past 12 hour(s))   EKG, 12 LEAD, INITIAL    Collection Time: 08/24/20  8:03 PM   Result Value Ref Range    Ventricular Rate 61 BPM    Atrial Rate 61 BPM    P-R Interval 182 ms    QRS Duration 88 ms    Q-T Interval 406 ms    QTC Calculation (Bezet) 408 ms    Calculated P Axis 52 degrees    Calculated R Axis 33 degrees    Calculated T Axis 55 degrees    Diagnosis       Normal sinus rhythm  Normal ECG  When compared with ECG of 22-JUL-2020 11:05,  Nonspecific T wave abnormality no longer evident in Inferior leads     CBC WITH AUTOMATED DIFF    Collection Time: 08/24/20  8:22 PM   Result Value Ref Range    WBC 10.8 3.6 - 11.0 K/uL    RBC 4.28 3.80 - 5.20 M/uL    HGB 12.6 11.5 - 16.0 g/dL    HCT 38.8 35.0 - 47.0 %    MCV 90.7 80.0 - 99.0 FL    MCH 29.4 26.0 - 34.0 PG    MCHC 32.5 30.0 - 36.5 g/dL    RDW 12.6 11.5 - 14.5 %    PLATELET 761 (H) 052 - 400 K/uL    MPV 9.9 8.9 - 12.9 FL    NRBC 0.0 0  WBC    ABSOLUTE NRBC 0.00 0.00 - 0.01 K/uL    NEUTROPHILS 58 32 - 75 %    LYMPHOCYTES 31 12 - 49 %    MONOCYTES 9 5 - 13 %    EOSINOPHILS 1 0 - 7 %    BASOPHILS 1 0 - 1 %    IMMATURE GRANULOCYTES 0 0.0 - 0.5 %    ABS. NEUTROPHILS 6.3 1.8 - 8.0 K/UL    ABS. LYMPHOCYTES 3.3 0.8 - 3.5 K/UL    ABS. MONOCYTES 1.0 0.0 - 1.0 K/UL    ABS. EOSINOPHILS 0.1 0.0 - 0.4 K/UL    ABS. BASOPHILS 0.1 0.0 - 0.1 K/UL    ABS. IMM.  GRANS. 0.0 0.00 - 0.04 K/UL    DF AUTOMATED      RBC COMMENTS NORMOCYTIC, NORMOCHROMIC     METABOLIC PANEL, COMPREHENSIVE    Collection Time: 08/24/20  8:22 PM   Result Value Ref Range    Sodium 134 (L) 136 - 145 mmol/L    Potassium 4.3 3.5 - 5.1 mmol/L    Chloride 99 97 - 108 mmol/L    CO2 32 21 - 32 mmol/L    Anion gap 3 (L) 5 - 15 mmol/L    Glucose 143 (H) 65 - 100 mg/dL    BUN 17 6 - 20 MG/DL    Creatinine 0.80 0.55 - 1.02 MG/DL    BUN/Creatinine ratio 21 (H) 12 - 20      GFR est AA >60 >60 ml/min/1.73m2    GFR est non-AA >60 >60 ml/min/1.73m2    Calcium 9.9 8.5 - 10.1 MG/DL    Bilirubin, total 0.2 0.2 - 1.0 MG/DL    ALT (SGPT) 14 12 - 78 U/L    AST (SGOT) 10 (L) 15 - 37 U/L    Alk. phosphatase 83 45 - 117 U/L    Protein, total 8.5 (H) 6.4 - 8.2 g/dL    Albumin 3.7 3.5 - 5.0 g/dL    Globulin 4.8 (H) 2.0 - 4.0 g/dL    A-G Ratio 0.8 (L) 1.1 - 2.2     CK W/ REFLX CKMB    Collection Time: 08/24/20  8:22 PM   Result Value Ref Range    CK 97 26 - 192 U/L   NT-PRO BNP    Collection Time: 08/24/20  8:22 PM   Result Value Ref Range    NT pro- <125 PG/ML   POC EG7    Collection Time: 08/25/20  1:24 AM   Result Value Ref Range    Calcium, ionized (POC) 1.30 1.12 - 1.32 mmol/L    pH (POC) 7.43 7.35 - 7.45      pCO2 (POC) 42.4 35.0 - 45.0 MMHG    pO2 (POC) 85 80 - 100 MMHG    HCO3 (POC) 28.0 (H) 22 - 26 MMOL/L    Base excess (POC) 4 mmol/L    sO2 (POC) 97 92 - 97 %    Site RIGHT BRACHIAL      Device: ROOM AIR      Allens test (POC) N/A      Specimen type (POC) ARTERIAL      Total resp. rate 15         Radiologic Studies -   XR CHEST PA LAT   Final Result   IMPRESSION: No acute cardiopulmonary process. Unchanged linear scarring in the   lingula. CT Results  (Last 48 hours)    None        CXR Results  (Last 48 hours)               08/25/20 0133  XR CHEST PA LAT Final result    Impression:  IMPRESSION: No acute cardiopulmonary process. Unchanged linear scarring in the   lingula.            Narrative:  EXAM:  XR CHEST PA LAT       INDICATION:   sob       COMPARISON: Chest radiograph 7/3/2018. FINDINGS: PA and lateral radiographs of the chest were obtained. No evidence of focal consolidation. Unchanged linear scarring in the lingula. No   pleural effusion or pneumothorax. Heart, christa, mediastinum are within normal   limits. No acute osseous abnormalities. Medical Decision Making   I am the first provider for this patient. I reviewed the vital signs, available nursing notes, past medical history, past surgical history, family history and social history. Vital Signs-Reviewed the patient's vital signs. Patient Vitals for the past 12 hrs:   Temp Pulse Resp BP SpO2   08/25/20 0215  60 12  (!) 87 %   08/25/20 0200  62 14  91 %   08/25/20 0145  (!) 57 14  97 %   08/25/20 0115  (!) 56 14 128/87 97 %   08/25/20 0100  63 23 135/83 98 %   08/25/20 0045  66 12  99 %   08/25/20 0030  60 9 (!) 147/99 98 %   08/25/20 0015  (!) 58 11 (!) 134/97 98 %   08/24/20 2345  (!) 59 15 145/85 98 %   08/24/20 2330  61 13  98 %   08/24/20 2315  (!) 57 14  98 %   08/24/20 2015 98.5 °F (36.9 °C) 65 18 (!) 161/107 98 %       Pulse Oximetry Analysis - 98% on RA    Cardiac Monitor:   Rate: 60 bpm  Rhythm: nsr      Provider Notes (Medical Decision Making):     DDX:  Sequela of sleep apnea, medication side effect, pneumonia, fluid overload    Plan:  Labs, chest x-ray, BNP    Impression:  Chronic fatigue, shortness of breath, deconditioning    ED Course:   Initial assessment performed. The patients presenting problems have been discussed, and they are in agreement with the care plan formulated and outlined with them. I have encouraged them to ask questions as they arise throughout their visit.     I reviewed our electronic medical record system for any past medical records that were available that may contribute to the patients current condition, the nursing notes and and vital signs from today's visit    Nursing notes will be reviewed as they become available in realtime while the pt has been in the ED. Coco Sahu MD    HYPERTENSION COUNSELING:  Patient made aware of their elevated blood pressure and is instructed to follow up this week with their Primary Care or [address] [address] for a recheck (should they be discharged.) Patient is counseled regarding consequences of chronic, uncontrolled hypertension including kidney disease, heart disease, stroke or even death. Patient states their understanding    I personally reviewed/interpreted pt's imaging. Agree with official read by radiology as noted above. Coco Sahu MD      2:24 AM  Progress note:  Pt noted to be feeling better, ready for discharge. Discussed lab and imaging findings with pt, specifically noting negative findings on chest x-ray. Pt will follow up with primary care, sleep medicine, neurology, pain management as instructed. All questions have been answered, pt voiced understanding and agreement with plan. Specific return precautions provided in addition to instructions for pt to return to the ED immediately should sx worsen at any time. Coco Sahu MD           Critical Care Time:     none      Diagnosis     Clinical Impression:   1. Malaise and fatigue    2. H/O sleep apnea    3. History of recent stroke    4. Other chronic pain        PLAN:  1. Current Discharge Medication List        2.    Follow-up Information     Follow up With Specialties Details Why Contact Info    April Goodwin MD Internal Medicine   [address] [place] [address]  [phone]      Melvin Preston MD Physical Medicine and Rehabilitation Schedule an appointment as soon as possible for a visit in 2 days  [facility]   [phone] [id number] [phone]      Margaret Moulton MD Neurology Schedule an appointment as soon as possible for a visit in 2 days  [facility]  [address] [facility] [address] [facility] Sleep Medicine Schedule an appointment as soon as possible for a visit in 2 days  [address]  [address] [facility]  [phone]        Return to ED if worse     Disposition:  2:25 AM  The patient's results have been reviewed with family and/or caregiver. They verbally convey their understanding and agreement of the patient's signs, symptoms, diagnosis, treatment and prognosis and additionally agree to follow up as recommended in the discharge instructions or to return to the Emergency Room should the patient's condition change prior to their follow-up appointment. The family and/or caregiver verbally agrees with the care-plan and all of their questions have been answered. The discharge instructions have also been provided to the them with educational information regarding the patient's diagnosis as well a list of reasons why the patient would want to return to the ER prior to their follow-up appointment should their condition change. Claude Saddler, MD            This note will not be viewable in [address]

## 2020-08-25 NOTE — DISCHARGE INSTRUCTIONS
Patient Education        Chronic Pain: Care Instructions  Your Care Instructions     Chronic pain is pain that lasts a long time (months or even years) and may or may not have a clear cause. It is different from acute pain, which usually does have a clear cause--like an injury or illness--and gets better over time. Chronic pain:  · Lasts over time but may vary from day to day. · Does not go away despite efforts to end it. · May disrupt your sleep and lead to fatigue. · May cause depression or anxiety. · May make your muscles tense, causing more pain. · Can disrupt your work, hobbies, home life, and relationships with friends and family. Chronic pain is a very real condition. It is not just in your head. Treatment can help and usually includes several methods used together, such as medicines, physical therapy, exercise, and other treatments. Learning how to relax and changing negative thought patterns can also help you cope. Chronic pain is complex. Taking an active role in your treatment will help you better manage your pain. Tell your doctor if you have trouble dealing with your pain. You may have to try several things before you find what works best for you. Follow-up care is a key part of your treatment and safety. Be sure to make and go to all appointments, and call your doctor if you are having problems. It's also a good idea to know your test results and keep a list of the medicines you take. How can you care for yourself at home? · Pace yourself. Break up large jobs into smaller tasks. Save harder tasks for days when you have less pain, or go back and forth between hard tasks and easier ones. Take rest breaks. · Relax, and reduce stress. Relaxation techniques such as deep breathing or meditation can help. · Keep moving. Gentle, daily exercise can help reduce pain over the long run. Try low- or no-impact exercises such as walking, swimming, and stationary biking.  Do stretches to stay flexible. · Try heat, cold packs, and massage. · Get enough sleep. Chronic pain can make you tired and drain your energy. Talk with your doctor if you have trouble sleeping because of pain. · Think positive. Your thoughts can affect your pain level. Do things that you enjoy to distract yourself when you have pain instead of focusing on the pain. See a movie, read a book, listen to music, or spend time with a friend. · If you think you are depressed, talk to your doctor about treatment. · Keep a daily pain diary. Record how your moods, thoughts, sleep patterns, activities, and medicine affect your pain. You may find that your pain is worse during or after certain activities or when you are feeling a certain emotion. Having a record of your pain can help you and your doctor find the best ways to treat your pain. · Take pain medicines exactly as directed. ? If the doctor gave you a prescription medicine for pain, take it as prescribed. ? If you are not taking a prescription pain medicine, ask your doctor if you can take an over-the-counter medicine. Reducing constipation caused by pain medicine  · Include fruits, vegetables, beans, and whole grains in your diet each day. These foods are high in fiber. · Drink plenty of fluids, enough so that your urine is light yellow or clear like water. If you have kidney, heart, or liver disease and have to limit fluids, talk with your doctor before you increase the amount of fluids you drink. · If your doctor recommends it, get more exercise. Walking is a good choice. Bit by bit, increase the amount you walk every day. Try for at least 30 minutes on most days of the week. · Schedule time each day for a bowel movement. A daily routine may help. Take your time and do not strain when having a bowel movement. When should you call for help? Call your doctor now or seek immediate medical care if:  · Your pain gets worse or is out of control.   · You feel down or blue, or you do not enjoy things like you once did. You may be depressed, which is common in people with chronic pain. Depression can be treated. · You have vomiting or cramps for more than 2 hours. Watch closely for changes in your health, and be sure to contact your doctor if:  · You cannot sleep because of pain. · You are very worried or anxious about your pain. · You have trouble taking your pain medicine. · You have any concerns about your pain medicine. · You have trouble with bowel movements, such as:  ? No bowel movement in 3 days. ? Blood in the anal area, in your stool, or on the toilet paper. ? Diarrhea for more than 24 hours. Where can you learn more? Go to http://www.gray.com/  Enter N004 in the search box to learn more about \"Chronic Pain: Care Instructions. \"  Current as of: November 20, 2019               Content Version: 12.5  © 8810-1909 [employer]. Care instructions adapted under license by [employer] (which disclaims liability or warranty for this information). If you have questions about a medical condition or this instruction, always ask your healthcare professional. [place] [address] any warranty or liability for your use of this information. Patient Education        Fatigue: Care Instructions  Your Care Instructions     Fatigue is a feeling of tiredness, exhaustion, or lack of energy. You may feel fatigue because of too much or not enough activity. It can also come from stress, lack of sleep, boredom, and poor diet. Many medical problems, such as viral infections, can cause fatigue. Emotional problems, especially depression, are often the cause of fatigue. Fatigue is most often a symptom of another problem. Treatment for fatigue depends on the cause. For example, if you have fatigue because you have a certain health problem, treating this problem also treats your fatigue.  If depression or anxiety is the cause, treatment may help. Follow-up care is a key part of your treatment and safety. Be sure to make and go to all appointments, and call your doctor if you are having problems. It's also a good idea to know your test results and keep a list of the medicines you take. How can you care for yourself at home? · Get regular exercise. But don't overdo it. Go back and forth between rest and exercise. · Get plenty of rest.  · Eat a healthy diet. Do not skip meals, especially breakfast.  · Reduce your use of caffeine, tobacco, and alcohol. Caffeine is most often found in coffee, tea, cola drinks, and chocolate. · Limit medicines that can cause fatigue. This includes tranquilizers and cold and allergy medicines. When should you call for help? Watch closely for changes in your health, and be sure to contact your doctor if:  · You have new symptoms such as fever or a rash. · Your fatigue gets worse. · You have been feeling down, depressed, or hopeless. Or you may have lost interest in things that you usually enjoy. · You are not getting better as expected. Where can you learn more? Go to http://manish-anthony.info/  Enter [id number] in the search box to learn more about \"Fatigue: Care Instructions. \"  Current as of: June 26, 2019               Content Version: 12.5  © 5440-9951 [employer]. Care instructions adapted under license by [employer] (which disclaims liability or warranty for this information). If you have questions about a medical condition or this instruction, always ask your healthcare professional. [place] [address] any warranty or liability for your use of this information. Patient Education        Muscle Conditioning: Exercises  Introduction  Here are some examples of exercises for muscle conditioning. Start each exercise slowly. Ease off the exercise if you start to have pain.   Your doctor or physical therapist will tell you when you can start these exercises and which ones will work best for you. How to do the exercises  Wall push-ups   When you can do this exercise against a wall comfortably (without your muscles feeling tired), you can try it against a counter. Start with 5 repetitions again and work up to 8 to 12. You can then slowly progress to the end of a couch or a sturdy chair, and finally to the floor. 1. Stand facing a wall, about 12 to 18 inches away. 2. Place your hands on the wall at shoulder height. 3. Slowly bend your elbows and bring your face toward the wall, moving your hips and shoulders forward together. 4. Push slowly back to the starting position. 5. Start with 5 repetitions and work up to 8 to 12. 6. Rest for a minute, and repeat the exercise. Knee extension   If this exercise becomes easy, you can add a light weight around your ankle or tie an elastic resistance band to a chair leg and one ankle. 1. While sitting in a chair, straighten one leg and hold while you slowly count to 5. Be sure you do not lock your knee. 2. Repeat 8 to 12 times. 3. Rest for a minute, and repeat the exercise. 4. Do the same exercise with the other leg. Side-lying leg lift   If this exercise becomes easy, you can add a light weight around your ankle or tie an elastic resistance band to both ankles. 1. Lie on your side, with your legs extended. Keep your hips straight up and down during this exercise. Do not let your top hip rock toward the back. Support your head with your hand, and place the other hand on the floor near your waist.  2. Slowly raise your upper leg until it is about in line with your shoulder. Keep your toes pointed forward. 3. Slowly lower your leg to the starting position. 4. Repeat 8 to 12 times. 5. Rest for a minute, and repeat the exercise. 6. Turn to your other side and do the same exercise with your other leg. Shallow standing knee bends   1.  Stand with your hands lightly resting on a counter or chair in front of you with your feet shoulder-width apart. 2. Slowly bend your knees so that you squat down just like you were going to sit in a chair. Make sure your knees do not go in front of your toes. 3. Lower yourself about 6 inches. Your heels should remain on the floor at all times. 4. Rise slowly to a standing position. 5. Repeat 8 to 12 times. 6. Rest for a minute, and repeat the exercise. Follow-up care is a key part of your treatment and safety. Be sure to make and go to all appointments, and call your doctor if you are having problems. It's also a good idea to know your test results and keep a list of the medicines you take. Where can you learn more? Go to http://manish-anthony.info/  Enter P608 in the search box to learn more about \"Muscle Conditioning: Exercises. \"  Current as of: January 16, 2020               Content Version: 12.5  © 2006-2020 [employer]. Care instructions adapted under license by [employer] (which disclaims liability or warranty for this information). If you have questions about a medical condition or this instruction, always ask your healthcare professional. [place] [address] any warranty or liability for your use of this information. Patient Education        Weakness: Care Instructions  Your Care Instructions     Weakness is a lack of physical or muscle strength. You may feel that you need to make extra effort to move your arms, legs, or other muscles. Generalized weakness means that you feel weak in most areas of your body. Another type of weakness may affect just one muscle or group of muscles. You may feel weak and tired after you have done too much activity, such as taking an extra-long hike. This is not a serious problem. It often goes away on its own. Feeling weak can also be caused by medical conditions like thyroid problems, depression, or a virus. Sometimes the cause can be serious.  Your doctor may want to do more tests to try to find the cause of the weakness. The doctor has checked you carefully, but problems can develop later. If you notice any problems or new symptoms, get medical treatment right away. Follow-up care is a key part of your treatment and safety. Be sure to make and go to all appointments, and call your doctor if you are having problems. It's also a good idea to know your test results and keep a list of the medicines you take. How can you care for yourself at home? · Rest when you feel tired. · Be safe with medicines. If your doctor prescribed medicine, take it exactly as prescribed. Call your doctor if you think you are having a problem with your medicine. You will get more details on the specific medicines your doctor prescribes. · Do not skip meals. Eating a balanced diet may increase your energy level. · Get some physical activity every day, but do not get too tired. When should you call for help? Call your doctor now or seek immediate medical care if:  · You have new or worse weakness. · You are dizzy or lightheaded, or you feel like you may faint. Watch closely for changes in your health, and be sure to contact your doctor if:  · You do not get better as expected. Where can you learn more? Go to http://manish-anthony.info/  Enter V492 in the search box to learn more about \"Weakness: Care Instructions. \"  Current as of: June 26, 2019               Content Version: 12.5  © 4215-8095 [employer]. Care instructions adapted under license by [employer] (which disclaims liability or warranty for this information). If you have questions about a medical condition or this instruction, always ask your healthcare professional. [address] any warranty or liability for your use of this information.

## 2020-09-01 ENCOUNTER — VIRTUAL VISIT (OUTPATIENT)
Dept: SLEEP MEDICINE | Age: 65
End: 2020-09-01

## 2020-09-01 ENCOUNTER — OFFICE VISIT (OUTPATIENT)
Dept: NEUROLOGY | Age: 65
End: 2020-09-01
Payer: MEDICARE

## 2020-09-01 VITALS
BODY MASS INDEX: 27.75 KG/M2 | HEIGHT: 61 IN | DIASTOLIC BLOOD PRESSURE: 70 MMHG | HEART RATE: 82 BPM | RESPIRATION RATE: 18 BRPM | SYSTOLIC BLOOD PRESSURE: 127 MMHG | TEMPERATURE: 98.7 F | WEIGHT: 147 LBS | OXYGEN SATURATION: 98 %

## 2020-09-01 DIAGNOSIS — M54.2 NECK PAIN: Primary | ICD-10-CM

## 2020-09-01 PROCEDURE — 99215 OFFICE O/P EST HI 40 MIN: CPT | Performed by: PSYCHIATRY & NEUROLOGY

## 2020-09-01 RX ORDER — TIZANIDINE 2 MG/1
2 TABLET ORAL
Qty: 20 TAB | Refills: 1 | Status: SHIPPED | OUTPATIENT
Start: 2020-09-01

## 2020-09-01 NOTE — PROGRESS NOTES
Patient unable to continue NP VV due to technical issues. Patient's daughter reported previous sleep study at [facility] 4-5 years ago. Will obtain copy of study and reschedule appointment. Patient and daughter in agreement.

## 2020-09-01 NOTE — PROGRESS NOTES
Reason for Consultation:  Stroke     Chief Complaint   Patient presents with   [facility] Follow Up     Stroke f/u pt c/o neck pain    Tingling     hands        History of Present Illness:   Isabel Gupta is a 76 y.o. female with hx of hypertension, hyperlipidemia and diabetes who is admitted tot the hospital after she was found to have a stroke in the right MCA territory. Patient reports that she was at her baseline state of health and then developed started developing pain along the right side of her neck and behind the ear. He also reports that she had tingling and numbness in the left hand. As her symptoms worsen and she was unable to sleep she was brought to the emergency room for evaluation. Patient does report that she has previously had symptoms of slurred speech that lasted a few seconds in the last month as well as left hand weakness again transient in nature. She did not seek medical attention for the symptoms. While in the emergency room patient underwent an MRI of the brain which showed areas of diffusion restriction within the right frontoparietal lobe. He also underwent a CTA head and neck which showed no large vessel occlusion but areas of multifocal stenosis in the intracerebral vasculature. Interval hx   Since discharge from the hospital patient has been seen in the emergency room for ongoing neck pain as well as stiffness. Patient reports that if she turns her head in a certain direction she develops a sharp shooting pain that goes from her head into her neck. Reports that this has significantly limited her ability working with physical therapy as well as with her sleep. Does report that there is some improvement in the pain when she worked with physical therapy however it is only short-lived. She is additionally taking baclofen 20 mg 3 times a day however due to sedation she is only taking this at night.   In regards to stroke she has had no further symptoms of weakness, numbness, tingling, speech changes or vision changes. She is currently taking aspirin and Plavix and has been compliant with her medications. Past Medical History:   Diagnosis Date    Arthritis     Breast cyst     Depressive disorder, not elsewhere classified 6/17/2013    Fibromyalgia 6/17/2013    Hypertension     Menopause     Mixed hyperlipidemia 6/17/2013    Type II or unspecified type diabetes mellitus without mention of complication, not stated as uncontrolled 6/17/2013    Unspecified vitamin D deficiency 6/17/2013        Past Surgical History:   Procedure Laterality Date    ABDOMEN SURGERY PROC UNLISTED  6/2013    Colon resection    HX BREAST BIOPSY Left     cyst removal    HX CHOLECYSTECTOMY      HX COLONOSCOPY  7/28/14    HX HYSTERECTOMY      HX MOHS PROCEDURES      HX OOPHORECTOMY      HX OTHER SURGICAL      cyst removed from spine        Family History   Problem Relation Age of Onset    Diabetes Mother     Hypertension Mother     Kidney Disease Mother     Diabetes Father     Heart Disease Father     Hypertension Sister     Stroke Sister     Heart Disease Sister     Hypertension Sister     Hypertension Brother     Hypertension Sister         Social History     Tobacco Use    Smoking status: Former Smoker    Smokeless tobacco: Never Used    Tobacco comment: quit 2010   Substance Use Topics    Alcohol use: No        Allergies   Allergen Reactions    Bees [Hymenoptera Allergenic Extract] Nausea Only    Menthol Chest Rub [Camph-Eucalypt-Men-Turp-Pet] Rash    Percocet [Oxycodone-Acetaminophen] Unknown (comments)        Prior to Admission medications    Medication Sig Start Date End Date Taking? Authorizing Provider   aspirin 81 mg chewable tablet Take 1 Tab by mouth daily for 90 days. 7/25/20 10/23/20 Yes Denton Salvador MD   clopidogreL (PLAVIX) 75 mg tab Take 1 Tab by mouth daily for 90 days.  7/25/20 10/23/20 Yes Denton Salvador MD   metoprolol tartrate (LOPRESSOR) 25 mg tablet Take 0.5 Tabs by mouth every twelve (12) hours for 90 days. 7/24/20 10/22/20 Yes Elmo Figueredo MD   OTHER Outpatient physical therapy twice a week for 3mois   To be renewed by pcp 7/24/20  Yes Elmo Figueredo MD   metFORMIN (GLUCOPHAGE) 1,000 mg tablet Take 1,000 mg by mouth two (2) times daily (with meals). Yes Provider, Historical   losartan (COZAAR) 100 mg tablet Take 100 mg by mouth daily. Yes Provider, Historical   SITagliptin (Januvia) 100 mg tablet Take 100 mg by mouth daily. Yes Provider, Historical   baclofen (LIORESAL) 10 mg tablet Take 5 mg by mouth two (2) times a day. Yes Provider, Historical   ALPRAZolam (XANAX) 1 mg tablet Take 1 Tab by mouth two (2) times daily as needed for Anxiety. Max Daily Amount: 2 mg. Indications: ANXIETY WITH DEPRESSION 1/19/16  Yes Brice Calle NP   amLODIPine (NORVASC) 10 mg tablet Take 1 Tab by mouth daily. 11/19/15  Yes Brice Calle NP   atorvastatin (LIPITOR) 10 mg tablet TAKE ONE TABLET BY MOUTH ONCE DAILY  Patient taking differently: 20 mg. TAKE ONE TABLET BY MOUTH ONCE DAILY 11/17/15  Yes Brice Calle NP   Nebulizer Accessories kit Use as directed. 10/1/15  Yes Brice Calle NP   glucose blood VI test strips (ASCENSIA AUTODISC VI, ONE TOUCH ULTRA TEST VI) strip Test blood sugar three time daily. 6/29/15  Yes Brice Calle NP   acetaminophen (TYLENOL EXTRA STRENGTH) 500 mg tablet Take  by mouth every six (6) hours as needed for Pain. Yes Provider, Historical   albuterol (PROAIR HFA) 90 mcg/actuation inhaler Take 1 puff by inhalation every four (4) hours as needed for Wheezing or Shortness of Breath. 12/5/14  Yes Brice Calle NP   cholecalciferol, vitamin D3, (VITAMIN D3) 2,000 unit tab Take  by mouth daily. Yes Provider, Historical   0.9 % SODIUM CHLORIDE (NASAL MIST NA) by Nasal route.    Yes Provider, Historical   albuterol (PROVENTIL VENTOLIN) 2.5 mg /3 mL (0.083 %) nebulizer solution 3 mL by Nebulization route every four (4) hours as needed for Wheezing. 11/20/14  Yes KAUR Larson       Review of Systems:    General, constitutional: negative  Eyes, vision: negative  Ears, nose, throat: negative  Cardiovascular, heart: negative  Respiratory: negative  Gastrointestinal: negative  Genitourinary: negative  Musculoskeletal: negative  Skin and integumentary: negative  Psychiatric: negative  Endocrine: negative  Neurological: negative, except for HPI  Hematologic/lymphatic: negative  Allergy/immunology: negative    PHYSICAL EXAMINATION:   Visit Vitals  /70 (BP 1 Location: Left arm, BP Patient Position: Sitting)   Pulse 82   Temp 98.7 °F (37.1 °C) (Oral)   Resp 18   Ht 5' 1\" (1.549 m)   Wt 147 lb (66.7 kg)   SpO2 98%   BMI 27.78 kg/m²       Physical Exam:  General:  no acute distress  Neck: no carotid bruits  Lungs: clear to auscultation  Heart:  no murmurs, regular rate and rhythm   Lower extremity: no edema    Neurological exam:  Mental Status: Awake, alert, oriented to person, place and time  Attention and Concentration: able to state the days of the week backwards   Speech and Language: No dysarthria. Able to name, repeat and follow commands   Fund of knowledge was preserved    Cranial nerves: II-XII  Pupils equal and reactive, visual fields intact by confrontation   Extraocular movements intact, no evidence of nystagmus or ptosis   Facial sensation intact   Facial movements symmetric, very slight nasolabial fold flattening on the left at rest.  Hearing intact to soft rub bilaterally   Shoulder shrug symmetric and strong   Tongue protrusion full and midline without fasciculation or atrophy    Motor:   Normal tone and Bulk   Drift: No evidence of pronator drift     Strength testing:   deltoid triceps biceps Wrist ext. Wrist flex. intrinsics   Right 5 5 5 5 5 5   Left 5 5 5 5 5 5      Hip flex. Hip ext. Knee ext.   Knee flex Dorsi flex Plantar flex   Right  5 5 5 5 5 5   Left 5 5 5 5 5 5       Sensory:  Upper extremity: Incision intact to light touch and pinprick. No extinction  Lower extremity: Length dependent sensation loss to temperature in the lower extremities. Reflexes:   Biceps Triceps  Brachiorad Patellar Achilles Plantar Hoffmans   Right  2 2 2 2 2 Down Neg   Left  2 2 2 2 2 Down Neg      Cerebellar testing:  No dysmetria. Normal rapid alternating movements; finger-to-nose and heel-to- shin testing are within normal limits. Romberg: absent    Gait: Steady however keeps her head stiff    LAB DATA REVIEWED:    Lab Results   Component Value Date/Time    Hemoglobin A1c 7.0 (H) 07/23/2020 03:33 AM    Sodium 134 (L) 08/24/2020 08:22 PM    Potassium 4.3 08/24/2020 08:22 PM    Chloride 99 08/24/2020 08:22 PM    Glucose 143 (H) 08/24/2020 08:22 PM    BUN 17 08/24/2020 08:22 PM    Creatinine 0.80 08/24/2020 08:22 PM    Calcium 9.9 08/24/2020 08:22 PM    WBC 10.8 08/24/2020 08:22 PM    HCT 38.8 08/24/2020 08:22 PM    HGB 12.6 08/24/2020 08:22 PM    PLATELET 885 (H) 19/48/0354 08:22 PM       Stroke workup    MRI Brain  Reviewed MRI brain which showed areas of diffusion restriction in the right frontoparietal hemisphere. These do appear to be watershed type strokes. Additionally, there was significant amount of T2 hyperintensities in the periventricular area. Also old microhemorrhages were also noted in the bilateral thalami. CTA head and neck   Independently reviewed which showed some mild stenosis at the left internal carotid artery just distal to the bifurcation. Additionally there appeared to be some mild atherosclerotic disease at the bilateral siphons stenosis of the distal right M1.    TTE:   TTE with EF of 55 to 60%. There is some left ventricular diastolic dysfunction.     Stroke labs:    HgBA1c    Lab Results   Component Value Date/Time    Hemoglobin A1c 7.0 (H) 07/23/2020 03:33 AM       LDL   Lab Results   Component Value Date/Time    LDL, calculated 92.2 07/23/2020 03:33 AM       EKG: Normal sinus rhythm      Assessment and Plan:   Baron Cruz is a 76 y.o. female with hx of hypertension, hyperlipidemia and diabetes who presents to the hospital with right-sided neck pain found to have a right MCA stroke. Right MCA stroke: Etiology secondary to intracranial atherosclerosis as her CTA showed right M1 stenosis. - Recommend maintaining SBP goal is less than 140/90 (this is the long term goal)   - would recommend to continue 81mg daily for secondary stroke prevention. Continue Plavix 75 mg per SAMMPRIS trial for 3 months.  - Risk factor modification: Hemoglobin A1c 7% and LDL 92.   - if LDL > 70, continue atorvastatin 40mg daily   - Patient was counselled on stroke education and understands if has additional symptoms of slurred speech, weakness, sensation loss or vision loss to call 911  - Discussed with benefits of a low fat, low sodium diet with the addition of multiple servings of fruits and vegetables. Also discussed importance of physical activity, at least 30 min 3 times a day. - Counselled on the importance of medication compliance     Neck pain: Unclear etiology however concerning for possible radiculopathy. She does not have any evidence of hypo-or hyperreflexia on neurological exam.  -We will obtain an MRI of the brain with and without contrast to determine etiology. Hypertension:  - Recommend maintaining SBP goal is less than 140/90 (this is the long term goal)   -PCP to manage antihypertensives    Diabetes: HbA1c 7.0%  - continue anti-glycemic agents. She is currently at goal for hemoglobin A1c. Hyperlipidemia: LDL 92  Continue atorvastatin 40 mg daily for an LDL goal of less than 70. We discussed extensively the importance of lifestyle modification including smoking cessation, diet, and incorporating exercise into daily routine.        Jhoana Riddle MD

## 2020-09-01 NOTE — PATIENT INSTRUCTIONS
Neck: Exercises  Introduction  Here are some examples of exercises for you to try. The exercises may be suggested for a condition or for rehabilitation. Start each exercise slowly. Ease off the exercises if you start to have pain. You will be told when to start these exercises and which ones will work best for you. How to do the exercises  Neck stretch   1. This stretch works best if you keep your shoulder down as you lean away from it. To help you remember to do this, start by relaxing your shoulders and lightly holding on to your thighs or your chair. 2. Tilt your head toward your shoulder and hold for 15 to 30 seconds. Let the weight of your head stretch your muscles. 3. If you would like a little added stretch, use your hand to gently and steadily pull your head toward your shoulder. For example, keeping your right shoulder down, lean your head to the left. 4. Repeat 2 to 4 times toward each shoulder. Diagonal neck stretch   1. Turn your head slightly toward the direction you will be stretching, and tilt your head diagonally toward your chest and hold for 15 to 30 seconds. 2. If you would like a little added stretch, use your hand to gently and steadily pull your head forward on the diagonal.  3. Repeat 2 to 4 times toward each side. Dorsal glide stretch   The dorsal glide stretches the back of the neck. If you feel pain, do not glide so far back. Some people find this exercise easier to do while lying on their backs with an ice pack on the neck. 1. Sit or stand tall and look straight ahead. 2. Slowly tuck your chin as you glide your head backward over your body  3. Hold for a count of 6, and then relax for up to 10 seconds. 4. Repeat 8 to 12 times. Chest and shoulder stretch   1. Sit or stand tall and glide your head backward as in the dorsal glide stretch. 2. Raise both arms so that your hands are next to your ears.   3. Take a deep breath, and as you breathe out, lower your elbows down and behind your back. You will feel your shoulder blades slide down and together, and at the same time you will feel a stretch across your chest and the front of your shoulders. 4. Hold for about 6 seconds, and then relax for up to 10 seconds. 5. Repeat 8 to 12 times. Strengthening: Hands on head   1. Move your head backward, forward, and side to side against gentle pressure from your hands, holding each position for about 6 seconds. 2. Repeat 8 to 12 times. Follow-up care is a key part of your treatment and safety. Be sure to make and go to all appointments, and call your doctor if you are having problems. It's also a good idea to know your test results and keep a list of the medicines you take. Where can you learn more? Go to http://manish-anthony.info/  Enter P975 in the search box to learn more about \"Neck: Exercises. \"  Current as of: March 2, 2020               Content Version: 12.5  © 2006-2020 [employer]. Care instructions adapted under license by [employer] (which disclaims liability or warranty for this information). If you have questions about a medical condition or this instruction, always ask your healthcare professional. [address] any warranty or liability for your use of this information. Learning About an Ischemic Stroke  What is an ischemic stroke? An ischemic (say \"mux-KHS-oftv\") stroke occurs when a blood clot blocks a blood vessel in the brain. This means that blood cannot flow to some part of the brain. Without blood and the oxygen it carries, this part of the brain starts to die. The part of the body controlled by the damaged area of the brain cannot work properly. This is different from a hemorrhagic (say \"gnk-hhv-RO-jick\") stroke, which happens when a blood vessel in the brain has burst open or has started to leak. The brain damage from a stroke starts within minutes.  Quick treatment can help limit damage to the brain and make recovery more likely. People who have had a stroke may have a hard time talking, understanding things, and making decisions. They may have to relearn daily activities, such as how to eat, bathe, and dress. How well someone recovers from a stroke depends on how quickly the person gets to the hospital, where in the brain the stroke happened, and how severe it was. Training and therapy also make a difference in how well people recover. What are the symptoms? If you have any of these symptoms, [id number] other emergency services right away:   · You have symptoms of a stroke. These may include:  ? Sudden numbness, tingling, weakness, or loss of movement in your face, arm, or leg, especially on only one side of your body. ? Sudden vision changes. ? Sudden trouble speaking. ? Sudden confusion or trouble understanding simple statements. ? Sudden problems with walking or balance. ? A sudden, severe headache that is different from past headaches. See your doctor if you have symptoms that seem like a stroke, even if they go away quickly. You may have had a transient ischemic attack (TIA), sometimes called a mini-stroke. A TIA is a warning that a stroke may happen soon. Getting early treatment for a TIA can help prevent a stroke. What causes an ischemic stroke? An ischemic stroke is caused by a blood clot that blocks blood flow in the brain. The most common causes of blood clots include:  · Hardening of the arteries (atherosclerosis). This is caused by high blood pressure, diabetes, high cholesterol, or smoking. · Atrial fibrillation. How is ischemic stroke treated? Emergency treatment may be done to restore blood flow to the brain using medicine or a procedure. You may take medicines to help prevent another stroke. Ask your doctor if a stroke rehab program is right for you. How can you prevent another stroke? · Work with your doctor to treat any health problems you have.  High blood pressure, high cholesterol, atrial fibrillation, and diabetes all raise your chances of having a stroke. · Be safe with medicines. Take your medicine exactly as prescribed. Call your doctor if you think you are having a problem with your medicine. · Have a healthy lifestyle. ? Do not smoke or allow others to smoke around you. If you need help quitting, talk to your doctor about stop-smoking programs and medicines. These can increase your chances of quitting for good. Smoking makes a stroke more likely. ? Limit alcohol to 2 drinks a day for men and 1 drink a day for women. ? Lose weight if you need to. A healthy weight will help you keep your heart and body healthy. ? Be active. Ask your doctor what type and level of activity is safe for you.  ? Eat heart-healthy foods, like fruits, vegetables, and high-fiber foods. Follow-up care is a key part of your treatment and safety. Be sure to make and go to all appointments, and call your doctor if you are having problems. It's also a good idea to know your test results and keep a list of the medicines you take. Where can you learn more? Go to http://manish-anthony.info/  Enter D161 in the search box to learn more about \"Learning About an Ischemic Stroke. \"  Current as of: March 4, 2020               Content Version: 12.5  © 9256-5209 [employer]. Care instructions adapted under license by [employer] (which disclaims liability or warranty for this information). If you have questions about a medical condition or this instruction, always ask your healthcare professional. [place] [address] any warranty or liability for your use of this information.

## 2020-09-08 DIAGNOSIS — M54.2 NECK PAIN: ICD-10-CM

## 2020-09-10 ENCOUNTER — HOSPITAL ENCOUNTER (OUTPATIENT)
Dept: MRI IMAGING | Age: 65
Discharge: HOME | End: 2020-09-10
Attending: PSYCHIATRY & NEUROLOGY
Payer: MEDICARE

## 2020-09-10 PROCEDURE — A9575 INJ GADOTERATE MEGLUMI 0.1ML: HCPCS | Performed by: PSYCHIATRY & NEUROLOGY

## 2020-09-10 PROCEDURE — 74011250636 HC RX REV CODE- 250/636: Performed by: PSYCHIATRY & NEUROLOGY

## 2020-09-10 PROCEDURE — 72156 MRI NECK SPINE W/O & W/DYE: CPT

## 2020-09-10 RX ORDER — GADOTERATE MEGLUMINE 376.9 MG/ML
15 INJECTION INTRAVENOUS
Status: COMPLETED | OUTPATIENT
Start: 2020-09-10 | End: 2020-09-10

## 2020-09-10 RX ADMIN — GADOTERATE MEGLUMINE 13 ML: 376.9 INJECTION INTRAVENOUS at 17:50

## 2020-09-17 ENCOUNTER — HOSPITAL ENCOUNTER (OUTPATIENT)
Dept: MRI IMAGING | Age: 65
Discharge: HOME | End: 2020-09-17
Attending: NURSE PRACTITIONER
Payer: MEDICARE

## 2020-09-17 DIAGNOSIS — Z98.1 S/P SPINAL FUSION: ICD-10-CM

## 2020-09-17 DIAGNOSIS — M54.50 LOW BACK PAIN, UNSPECIFIED BACK PAIN LATERALITY, UNSPECIFIED CHRONICITY, UNSPECIFIED WHETHER SCIATICA PRESENT: ICD-10-CM

## 2020-09-17 DIAGNOSIS — M51.36 DDD (DEGENERATIVE DISC DISEASE), LUMBAR: ICD-10-CM

## 2020-09-17 PROCEDURE — 72148 MRI LUMBAR SPINE W/O DYE: CPT

## 2020-11-02 ENCOUNTER — DOCUMENTATION ONLY (OUTPATIENT)
Dept: SLEEP MEDICINE | Age: 65
End: 2020-11-02

## 2020-12-09 ENCOUNTER — NURSE TRIAGE (OUTPATIENT)
Dept: OTHER | Facility: CLINIC | Age: 65
End: 2020-12-09

## 2020-12-09 NOTE — TELEPHONE ENCOUNTER
Reason for Disposition   COVID-19, questions about    Answer Assessment - Initial Assessment Questions  1. COVID-19 CLOSE CONTACT: \"Who is the person with the confirmed or suspected COVID-19 infection that you were exposed to? \"      Both daughters tested + for covid 5 days ago     2. PLACE of CONTACT: \"Where were you when you were exposed to COVID-19? \" (e.g., home, school, medical waiting room; which city?)      They were living in the same home with the caller. 3. TYPE of CONTACT: \"How much contact was there? \" (e.g., sitting next to, live in same house, work in same office, same building)      Same house     4. DURATION of CONTACT: \"How long were you in contact with the COVID-19 patient? \" (e.g., a few seconds, passed by person, a few minutes, 15 minutes or longer, live with the patient)      Lives with the patients, was not in the same room with them. States has been trying to stay away in separate rooms, but does use the same kitchen. 5. MASK: \"Were you wearing a mask? \" \"Was the other person wearing a mask? \" Note: wearing a mask reduces the risk of an otherwise close contact. She has been wearing a mask and also the daughters were too. 6. DATE of CONTACT: \"When did you have contact with a COVID-19 patient? \" (e.g., how many days ago)      Dec 4-Dec 8th    7. COMMUNITY SPREAD: \"Are there lots of cases of COVID-19 (community spread) where you live? \" (See public health department website, if unsure)          8. SYMPTOMS: \"Do you have any symptoms? \" (e.g., fever, cough, breathing difficulty, loss of taste or smell)     Denies any symptoms     9. PREGNANCY OR POSTPARTUM: \"Is there any chance you are pregnant? \" \"When was your last menstrual period? \" \"Did you deliver in the last 2 weeks? \"       10. HIGH RISK: \"Do you have any heart or lung problems? Do you have a weak immune system? \" (e.g., heart failure, COPD, asthma, HIV positive, chemotherapy, renal failure, diabetes mellitus, sickle cell anemia, obesity)        Diabetic, stroke     11. TRAVEL: \"Have you traveled out of the country recently? \" If so, \"When and where? \" Also ask about out-of-state travel, since the CDC has identified some high-risk cities for community spread in the [address] [place] [address] [address]. Note: Travel becomes less relevant if there is widespread community transmission where the patient lives. No    Protocols used: CORONAVIRUS (COVID-19 ) EXPOSURE-ADULT-[place]    POD 7     Caller transferred to RN Triage. Caller states both of her daughters tested + for covid on 12/4 and 12/5. Caller was tested on 12/5, and results were negative. Pt asymptomatic at this time. Caller wanted to re-schedule her non-urgent appt. [facility] and will reschedule pt after her quarantine period after 12/21. Care advice as documented and pt verbalizes understanding. Attention provider: Thank you for allowing me to participate in the care of your patient. Please do not respond through this encounter as the response is not directed to a shared pool.

## 2021-01-07 ENCOUNTER — OFFICE VISIT (OUTPATIENT)
Dept: NEUROLOGY | Age: 66
End: 2021-01-07
Payer: MEDICARE

## 2021-01-07 DIAGNOSIS — I63.511 CEREBROVASCULAR ACCIDENT (CVA) DUE TO STENOSIS OF RIGHT MIDDLE CEREBRAL ARTERY (HCC): Primary | ICD-10-CM

## 2021-01-07 PROCEDURE — 99443 PR PHYS/QHP TELEPHONE EVALUATION 21-30 MIN: CPT | Performed by: PSYCHIATRY & NEUROLOGY

## 2021-01-07 NOTE — PROGRESS NOTES
Astrid Bradley is a 76 y.o. female, evaluated via audio-only technology on 1/7/2021 for Stroke (\" no compliants\")  . Assessment & Plan:   Assessment and Plan:   Astrid Bradley is a 76 y.o. female with hx of hypertension, hyperlipidemia and diabetes who presents to the hospital with right-sided neck pain found to have a right MCA stroke.       Right MCA stroke: Etiology secondary to intracranial atherosclerosis as her CTA showed right M1 stenosis. - Recommend maintaining SBP goal is less than 140/90 (this is the long term goal)   - would recommend to continue 81mg daily for secondary stroke prevention.  - Risk factor modification: Hemoglobin A1c 7.8% and .              - if LDL > 70, continue atorvastatin 40mg daily   -Can consider at next visit obtaining a CTA head and neck to determine the progression of the stenosis. - Patient was counselled on stroke education and understands if has additional symptoms of slurred speech, weakness, sensation loss or vision loss to call 911  - Discussed with benefits of a low fat, low sodium diet with the addition of multiple servings of fruits and vegetables. Also discussed importance of physical activity, at least 30 min 3 times a day. - Counselled on the importance of medication compliance   -We extensively discussed the importance of diet and exercise and how her risk factors can worsen the atherosclerosis seen on her CTA. I did discuss that this could cause another stroke in the same territory if she does not work on her risk factors. Patient verbalized understanding     Neck pain: Unclear etiology however concerning for possible radiculopathy.   She does not have any evidence of hypo-or hyperreflexia on neurological exam. MRI C spine showed degenerative disc disease, is being followed by ortho, received injections in the neck with improvement   - refer to physical therapy for ongoing management.      Hypertension:  - Recommend maintaining SBP goal is less than 140/90 (this is the long term goal)   -PCP to manage antihypertensives     Diabetes: HbA1c 7.0%  - continue anti-glycemic agents. She is currently at goal for hemoglobin A1c.     Hyperlipidemia: LDL 92  Continue atorvastatin 40 mg daily for an LDL goal of less than 70.    12  Subjective:   History of Present Illness:   Elizabeth Cordero is a 76 y.o. female with hx of hypertension, hyperlipidemia and diabetes who is admitted tot the hospital after she was found to have a stroke in the right MCA territory. Patient reports that she was at her baseline state of health and then developed started developing pain along the right side of her neck and behind the ear. He also reports that she had tingling and numbness in the left hand. As her symptoms worsen and she was unable to sleep she was brought to the emergency room for evaluation. Patient does report that she has previously had symptoms of slurred speech that lasted a few seconds in the last month as well as left hand weakness again transient in nature. She did not seek medical attention for the symptoms.     While in the emergency room patient underwent an MRI of the brain which showed areas of diffusion restriction within the right frontoparietal lobe. She also underwent a CTA head and neck which showed no large vessel occlusion but areas of multifocal stenosis in the intracerebral vasculature.     Interval hx   Since patient was last seen, she continues to complain of neck pain. She was seen Ortho who started doing injections in her neck. She has had some improvement in her symptoms. She tried physical therapy initially however given the severe pain she was unable to complete the sessions.   That her pain is better managed she would like to try physical therapy again  She reports that she checks her blood pressure every few days and it has been normal.  She reports that her diet has not been great recently and most recent LDL and hemoglobin A1c are increased since she was last seen. We discussed extensively diet and exercise  In regards to stroke she has had no further symptoms of weakness, numbness, tingling, speech changes or vision changes. She is currently taking aspirin and Plavix and has been compliant with her medications. Prior to Admission medications    Medication Sig Start Date End Date Taking? Authorizing Provider   tiZANidine (ZANAFLEX) 2 mg tablet Take 1 Tab by mouth three (3) times daily as needed for Muscle Spasm(s). 9/1/20  Yes Nathalia Ramos MD   OTHER Outpatient physical therapy twice a week for 3mois   To be renewed by pcp 7/24/20  Yes Favio Johansen MD   metFORMIN (GLUCOPHAGE) 1,000 mg tablet Take 1,000 mg by mouth two (2) times daily (with meals). Yes Provider, Historical   losartan (COZAAR) 100 mg tablet Take 100 mg by mouth daily. Yes Provider, Historical   SITagliptin (Januvia) 100 mg tablet Take 100 mg by mouth daily. Yes Provider, Historical   baclofen (LIORESAL) 10 mg tablet Take 5 mg by mouth two (2) times a day. Yes Provider, Historical   ALPRAZolam (XANAX) 1 mg tablet Take 1 Tab by mouth two (2) times daily as needed for Anxiety. Max Daily Amount: 2 mg. Indications: ANXIETY WITH DEPRESSION 1/19/16  Yes Cris Gomez NP   amLODIPine (NORVASC) 10 mg tablet Take 1 Tab by mouth daily. 11/19/15  Yes Cris Gomez NP   atorvastatin (LIPITOR) 10 mg tablet TAKE ONE TABLET BY MOUTH ONCE DAILY  Patient taking differently: 20 mg. TAKE ONE TABLET BY MOUTH ONCE DAILY 11/17/15  Yes Cris Gomez NP   Nebulizer Accessories kit Use as directed. 10/1/15  Yes Cris Gomez NP   glucose blood VI test strips (ASCENSIA AUTODISC VI, ONE TOUCH ULTRA TEST VI) strip Test blood sugar three time daily. 6/29/15  Yes Cris Gomez NP   acetaminophen (TYLENOL EXTRA STRENGTH) 500 mg tablet Take  by mouth every six (6) hours as needed for Pain.    Yes Provider, Historical   albuterol (PROAIR HFA) 90 mcg/actuation inhaler Take 1 puff by inhalation every four (4) hours as needed for Wheezing or Shortness of Breath. 12/5/14  Yes Cris Gomez NP   cholecalciferol, vitamin D3, (VITAMIN D3) 2,000 unit tab Take  by mouth daily. Yes Provider, Historical   0.9 % SODIUM CHLORIDE (NASAL MIST NA) by Nasal route. Yes Provider, Historical   albuterol (PROVENTIL VENTOLIN) 2.5 mg /3 mL (0.083 %) nebulizer solution 3 mL by Nebulization route every four (4) hours as needed for Wheezing.  11/20/14  Yes KAUR Smith     Patient Active Problem List    Diagnosis Date Noted    [facility]    Sleep apnea 07/16/2015    Insomnia 01/02/2015    Essential hypertension, benign 06/17/2013    Mixed hyperlipidemia 06/17/2013    Type II diabetes mellitus ([facility] 06/17/2013    Fibromyalgia 06/17/2013    Dysthymic disorder 06/17/2013    Depressive disorder, not elsewhere classified 06/17/2013    Unspecified vitamin D deficiency 06/17/2013     Allergies   Allergen Reactions    Bees [Hymenoptera Allergenic Extract] Nausea Only    Menthol Chest Rub [Camph-Eucalypt-Men-Turp-Pet] Rash    Percocet [Oxycodone-Acetaminophen] Unknown (comments)     Past Medical History:   Diagnosis Date    Arthritis     Breast cyst     Depressive disorder, not elsewhere classified 6/17/2013    Fibromyalgia 6/17/2013    Hypertension     Menopause     Mixed hyperlipidemia 6/17/2013    Type II or unspecified type diabetes mellitus without mention of complication, not stated as uncontrolled 6/17/2013    Unspecified vitamin D deficiency 6/17/2013     Past Surgical History:   Procedure Laterality Date    HX BREAST BIOPSY Left     cyst removal    HX CHOLECYSTECTOMY      HX COLONOSCOPY  7/28/14    HX HYSTERECTOMY      HX MOHS PROCEDURES      HX OOPHORECTOMY      HX OTHER SURGICAL      cyst removed from spine    PA ABDOMEN SURGERY PROC UNLISTED  6/2013    Colon resection     Family History   Problem Relation Age of Onset    Diabetes Mother     Hypertension Mother     Kidney Disease Mother     Diabetes Father     Heart Disease Father     Hypertension Sister     Stroke Sister     Heart Disease Sister     Hypertension Sister     Hypertension Brother     Hypertension Sister      Social History     Tobacco Use    Smoking status: Former Smoker    Smokeless tobacco: Never Used    Tobacco comment: quit 2010   Substance Use Topics    Alcohol use: No       ROS    No flowsheet data found. Waylon Barton, who was evaluated through a patient-initiated, synchronous (real-time) audio only encounter, and/or her healthcare decision maker, is aware that it is a billable service, with coverage as determined by her insurance carrier. She provided verbal consent to proceed: Yes. She has not had a related appointment within my department in the past 7 days or scheduled within the next 24 hours.       Total Time: 24 min     Nieves Powell MD

## 2021-01-27 ENCOUNTER — TELEPHONE (OUTPATIENT)
Dept: NEUROLOGY | Age: 66
End: 2021-01-27

## 2021-01-27 NOTE — TELEPHONE ENCOUNTER
----- Message from Ana Forrest sent at 1/27/2021  2:46 PM EST -----  Regarding: Dr Neri Number first and last name:      Reason for call:following up regarding ref that was discussed during her virtual visit on 1/7/2021 for ref and order for [facility]   Address: [address] [facility], [place], [facility] [address]   Phone: [phone] required yes/no and why:yes to confirm order was sent       Best contact number(s):[phone]      Details to clarify the request:  Pt was told  she would receive something in the mail also and so far nothing has came.     Ana Forrest

## 2021-04-08 ENCOUNTER — OFFICE VISIT (OUTPATIENT)
Dept: NEUROLOGY | Age: 66
End: 2021-04-08
Payer: MEDICARE

## 2021-04-08 VITALS
WEIGHT: 138 LBS | SYSTOLIC BLOOD PRESSURE: 138 MMHG | DIASTOLIC BLOOD PRESSURE: 90 MMHG | TEMPERATURE: 97 F | BODY MASS INDEX: 26.06 KG/M2 | HEART RATE: 90 BPM | OXYGEN SATURATION: 96 % | HEIGHT: 61 IN

## 2021-04-08 DIAGNOSIS — M54.2 NECK PAIN: ICD-10-CM

## 2021-04-08 DIAGNOSIS — I63.9 CEREBROVASCULAR ACCIDENT (CVA), UNSPECIFIED MECHANISM (HCC): Primary | ICD-10-CM

## 2021-04-08 DIAGNOSIS — E08.9 DIABETES MELLITUS DUE TO UNDERLYING CONDITION WITHOUT COMPLICATION, WITHOUT LONG-TERM CURRENT USE OF INSULIN (HCC): ICD-10-CM

## 2021-04-08 PROCEDURE — G8752 SYS BP LESS 140: HCPCS | Performed by: PSYCHIATRY & NEUROLOGY

## 2021-04-08 PROCEDURE — 2022F DILAT RTA XM EVC RTNOPTHY: CPT | Performed by: PSYCHIATRY & NEUROLOGY

## 2021-04-08 PROCEDURE — G8536 NO DOC ELDER MAL SCRN: HCPCS | Performed by: PSYCHIATRY & NEUROLOGY

## 2021-04-08 PROCEDURE — 1090F PRES/ABSN URINE INCON ASSESS: CPT | Performed by: PSYCHIATRY & NEUROLOGY

## 2021-04-08 PROCEDURE — G8427 DOCREV CUR MEDS BY ELIG CLIN: HCPCS | Performed by: PSYCHIATRY & NEUROLOGY

## 2021-04-08 PROCEDURE — G9717 DOC PT DX DEP/BP F/U NT REQ: HCPCS | Performed by: PSYCHIATRY & NEUROLOGY

## 2021-04-08 PROCEDURE — G9899 SCRN MAM PERF RSLTS DOC: HCPCS | Performed by: PSYCHIATRY & NEUROLOGY

## 2021-04-08 PROCEDURE — G8419 CALC BMI OUT NRM PARAM NOF/U: HCPCS | Performed by: PSYCHIATRY & NEUROLOGY

## 2021-04-08 PROCEDURE — 1101F PT FALLS ASSESS-DOCD LE1/YR: CPT | Performed by: PSYCHIATRY & NEUROLOGY

## 2021-04-08 PROCEDURE — 3046F HEMOGLOBIN A1C LEVEL >9.0%: CPT | Performed by: PSYCHIATRY & NEUROLOGY

## 2021-04-08 PROCEDURE — 3017F COLORECTAL CA SCREEN DOC REV: CPT | Performed by: PSYCHIATRY & NEUROLOGY

## 2021-04-08 PROCEDURE — 99215 OFFICE O/P EST HI 40 MIN: CPT | Performed by: PSYCHIATRY & NEUROLOGY

## 2021-04-08 PROCEDURE — G8755 DIAS BP > OR = 90: HCPCS | Performed by: PSYCHIATRY & NEUROLOGY

## 2021-04-08 PROCEDURE — G8400 PT W/DXA NO RESULTS DOC: HCPCS | Performed by: PSYCHIATRY & NEUROLOGY

## 2021-04-08 NOTE — PROGRESS NOTES
CC: follow up stroke     History of Present Illness:   Ibeth Waters is a 76 y.o. female with hx of hypertension, hyperlipidemia and diabetes who is admitted tot the hospital after she was found to have a stroke in the right MCA territory. Brittany Sanders reports that she was at her baseline state of health and then developed started developing pain along the right side of her neck and behind the ear.  He also reports that she had tingling and numbness in the left hand.  As her symptoms worsen and she was unable to sleep she was brought to the emergency room for evaluation. Brittany Sanders does report that she has previously had symptoms of slurred speech that lasted a few seconds in the last month as well as left hand weakness again transient in nature.  She did not seek medical attention for the symptoms.     While in the emergency room patient underwent an MRI of the brain which showed areas of diffusion restriction within the right frontoparietal lobe.  She also underwent a CTA head and neck which showed no large vessel occlusion but areas of multifocal stenosis in the intracerebral vasculature.     Interval hx   Since patient was last seen, she continues to complain of neck pain. She reports she was given Toradol injection which helped more for the pain. Additionally she started having right hip pain and was found to have severe arthritis. This is also being treated with Toradol injections. In regards to stroke she has had no further symptoms of weakness, numbness, tingling, speech changes or vision changes.  She is currently taking aspirin and Plavix and has been compliant with her medications.     Past Medical History:   Diagnosis Date    Arthritis     Breast cyst     Depressive disorder, not elsewhere classified 6/17/2013    Fibromyalgia 6/17/2013    Hypertension     Menopause     Mixed hyperlipidemia 6/17/2013    Type II or unspecified type diabetes mellitus without mention of complication, not stated as uncontrolled 6/17/2013    Unspecified vitamin D deficiency 6/17/2013        Past Surgical History:   Procedure Laterality Date    HX BREAST BIOPSY Left     cyst removal    HX CHOLECYSTECTOMY      HX COLONOSCOPY  7/28/14    HX HYSTERECTOMY      HX MOHS PROCEDURES      HX OOPHORECTOMY      HX OTHER SURGICAL      cyst removed from spine    [place] ABDOMEN SURGERY PROC UNLISTED  6/2013    Colon resection        Family History   Problem Relation Age of Onset    Diabetes Mother     Hypertension Mother     Kidney Disease Mother     Diabetes Father     Heart Disease Father     Hypertension Sister     Stroke Sister     Heart Disease Sister     Hypertension Sister     Hypertension Brother     Hypertension Sister         Social History     Tobacco Use    Smoking status: Former Smoker    Smokeless tobacco: Never Used    Tobacco comment: quit 2010   Substance Use Topics    Alcohol use: No        Allergies   Allergen Reactions    Bees [Hymenoptera Allergenic Extract] Nausea Only    Menthol Chest Rub [Camph-Eucalypt-Men-Turp-Pet] Rash    Percocet [Oxycodone-Acetaminophen] Unknown (comments)        Prior to Admission medications    Medication Sig Start Date End Date Taking? Authorizing Provider   tiZANidine (ZANAFLEX) 2 mg tablet Take 1 Tab by mouth three (3) times daily as needed for Muscle Spasm(s). 9/1/20  Yes Marija Edwards MD   OTHER Outpatient physical therapy twice a week for 3mois   To be renewed by pcp 7/24/20  Yes Ritu Gutierrez MD   metFORMIN (GLUCOPHAGE) 1,000 mg tablet Take 1,000 mg by mouth two (2) times daily (with meals). Yes Provider, Historical   losartan (COZAAR) 100 mg tablet Take 100 mg by mouth daily. Yes Provider, Historical   SITagliptin (Januvia) 100 mg tablet Take 100 mg by mouth daily. Yes Provider, Historical   baclofen (LIORESAL) 10 mg tablet Take 5 mg by mouth two (2) times a day.    Yes Provider, Historical   ALPRAZolam (XANAX) 1 mg tablet Take 1 Tab by mouth two (2) times daily as needed for Anxiety. Max Daily Amount: 2 mg. Indications: ANXIETY WITH DEPRESSION 1/19/16  Yes Leilani Steward NP   amLODIPine (NORVASC) 10 mg tablet Take 1 Tab by mouth daily. 11/19/15  Yes Leilani Steward NP   atorvastatin (LIPITOR) 10 mg tablet TAKE ONE TABLET BY MOUTH ONCE DAILY  Patient taking differently: 20 mg. TAKE ONE TABLET BY MOUTH ONCE DAILY 11/17/15  Yes Leilani Steward NP   Nebulizer Accessories kit Use as directed. 10/1/15  Yes Leilani Steward NP   glucose blood VI test strips (ASCENSIA AUTODISC VI, ONE TOUCH ULTRA TEST VI) strip Test blood sugar three time daily. 6/29/15  Yes Leilani Steward NP   acetaminophen (TYLENOL EXTRA STRENGTH) 500 mg tablet Take  by mouth every six (6) hours as needed for Pain. Yes Provider, Historical   albuterol (PROAIR HFA) 90 mcg/actuation inhaler Take 1 puff by inhalation every four (4) hours as needed for Wheezing or Shortness of Breath. 12/5/14  Yes Leilani Steward NP   cholecalciferol, vitamin D3, (VITAMIN D3) 2,000 unit tab Take  by mouth daily. Yes Provider, Historical   0.9 % SODIUM CHLORIDE (NASAL MIST NA) by Nasal route. Yes Provider, Historical   albuterol (PROVENTIL VENTOLIN) 2.5 mg /3 mL (0.083 %) nebulizer solution 3 mL by Nebulization route every four (4) hours as needed for Wheezing.  11/20/14  Yes KAUR Fong       Review of Systems:  General, constitutional: negative  Eyes, vision: negative  Ears, nose, throat: negative  Cardiovascular, heart: negative  Respiratory: negative  Gastrointestinal: negative  Genitourinary: negative  Musculoskeletal: negative  Skin and integumentary: negative  Psychiatric: negative  Endocrine: negative  Neurological: negative, except for HPI  Hematologic/lymphatic: negative  Allergy/immunology: negative    Visit Vitals  BP (!) 138/90   Pulse 90   Temp 97 °F (36.1 °C)   Ht 5' 1\" (1.549 m)   Wt 138 lb (62.6 kg)   SpO2 96%   BMI 26.07 kg/m²       Physical Exam:  General:  no acute distress  Neck: no carotid bruits  Lungs: clear to auscultation  Heart:  no murmurs, regular rate and rhythm   Lower extremity: no edema    Neurological exam:  Mental Status: Awake, alert, oriented to person, place and time  Attention and Concentration: able to state the days of the week backwards   Speech and Language: No dysarthria. Able to name, repeat and follow commands   Fund of knowledge was preserved    Cranial nerves: II-XII  Pupils equal and reactive, visual fields intact by confrontation   Extraocular movements intact, no evidence of nystagmus or ptosis   Facial sensation intact   Facial movements symmetric   Hearing intact to soft rub bilaterally   Shoulder shrug symmetric and strong   Tongue protrusion full and midline without fasciculation or atrophy    Motor:   Normal tone and Bulk   Drift: No evidence of pronator drift     Strength testing:   deltoid triceps biceps Wrist ext. Wrist flex. intrinsics   Right 5 5 5 5 5 5   Left 5 5 5 5 5 5      Hip flex. Hip ext. Knee ext. Knee flex Dorsi flex Plantar flex   Right  5 5 5 5 5 5   Left  5 5 5 5 5 5       Sensory:  Intact to light touch and pinprick. No extinction    Reflexes:     Biceps Triceps  Brachiorad Patellar Achilles Plantar Hoffmans   Right  2 2 2 2 2 Down Neg   Left  2 2 2 2 2 Down Neg        Cerebellar testing:  No dysmetria. finger-to-nose and heel-to- shin testing are within normal limits. Romberg: absent    Gait: steady    Data:     INTERNAL RECORDS:  The patient's electronic medical record was reviewed.   The relevant details include:    Lab Results   Component Value Date/Time    Hemoglobin A1c 7.8 (H) 11/19/2020 10:00 AM    Sodium 139 11/19/2020 10:00 AM    Potassium 3.9 11/19/2020 10:00 AM    Chloride 100 11/19/2020 10:00 AM    Glucose 285 (H) 11/19/2020 10:00 AM    BUN 15 11/19/2020 10:00 AM    Creatinine 0.99 11/19/2020 10:00 AM    Calcium 9.5 11/19/2020 10:00 AM    WBC 6.6 11/19/2020 10:00 AM    HCT 34.5 (L) 11/19/2020 10:00 AM    HGB 11.1 (L) 11/19/2020 10:00 AM    PLATELET 103 (H) 94/33/6033 10:00 AM       CT Results (maximum last 3): [facility] from [facility] 07[facility]22/20   CTA HEAD NECK W CONT    Narrative EXAM:  CTA HEAD NECK W CONT    INDICATION:   rt neck pain with left arm paresthesias    COMPARISON:  CT head 7/22/2020. CONTRAST:  100 mL of Isovue-370. TECHNIQUE:  Unenhanced  images were obtained to localize the volume for  acquisition. Multislice helical axial CT angiography was performed from the  aortic arch to the top of the head during uneventful rapid bolus intravenous  contrast administration. Coronal and sagittal reformations and 3D post  processing was performed. CT dose reduction was achieved through use of a  standardized protocol tailored for this examination and automatic exposure  control for dose modulation. FINDINGS:    CTA Head:  There is no evidence of large vessel occlusion or flow-limiting stenosis of the  intracranial internal carotid, anterior cerebral, and middle cerebral arteries. Calcific atherosclerosis of the bilateral carotid siphons with mild multifocal  stenosis on the right. Moderate stenosis of the distal right M1 segment and  moderate stenosis of the proximal right M2 segments. Moderate stenosis of the  proximal left M2 posterior division. The anterior communicating artery is  patent. There is no evidence of large vessel occlusion or flow-limiting stenosis of the  intracranial vertebral arteries, basilar artery, or posterior cerebral arteries. There is moderate stenosis of the distal right P2 segment. There is severe  stenosis in the mid left P2 segment. Mild stenosis of the proximal basilar  artery. The left posterior communicating artery is patent with an infundibulum  at its origin. There is a small 2 mm infundibulum versus aneurysm of the right posterior  communicating artery.  The dural venous sinuses and deep cerebral venous system  are patent. No evidence of abnormal enhancement on delayed phase images. CTA NECK:  NASCET method was utilized for calculating stenosis. The aortic arch is unremarkable. The common carotid arteries demonstrate no  significant stenosis. Mild calcific atherosclerosis of the right carotid  bifurcation without significant stenosis. Eccentric calcified plaque in the  proximal left internal carotid artery with mild (less than 50%) stenosis. Retropharyngeal course of the proximal bilateral internal carotid arteries. There is a codominant vertebrobasilar arterial system. The cervical vertebral  arteries are normal in course, size and contour without significant stenosis. Visualized soft tissues of the neck are unremarkable. Visualized lung apices are  clear. No acute fracture or aggressive osseous lesion. Mild degenerative disc  disease at C3-C4. Impression IMPRESSION:     CTA Head:  1. No evidence of flow-limiting stenosis. 2. Multifocal atherosclerotic disease as above, including severe stenosis of the  left P2 segment, moderate stenosis of the distal right M1 segment and moderate  stenosis of the bilateral M2 segments. 3. A 2 mm right posterior communicating artery infundibulum versus aneurysm. CTA Neck:  1. No evidence of flow-limiting stenosis. 2. Mild stenosis (less than 50% by NASCET criteria) of the proximal left  internal carotid artery. CT HEAD WO CONT    Narrative EXAM:  CT HEAD WO CONT    INDICATION:   left arm paresthesia with weakness since Sunday    COMPARISON: None. TECHNIQUE: Unenhanced CT of the head was performed using 5 mm images. Brain and  bone windows were generated. CT dose reduction was achieved through use of a  standardized protocol tailored for this examination and automatic exposure  control for dose modulation. FINDINGS:  The ventricles are normal in size and position.  Scattered periventricular and  deep white matter hypodensities, confluent in regions, consistent with moderate  chronic microangiopathic ischemic changes. Basilar cisterns are patent. No  midline shift. There is no evidence of acute infarct, hemorrhage, or extraaxial  fluid collection. Mild mucosal thickening in the bilateral ethmoidal air cells. The mastoid air  cells and middle ears are clear. The orbital contents are within normal limits. There are no significant osseous or extracranial soft tissue lesions. Impression IMPRESSION:  1. No evidence of acute intracranial abnormality. 2. Moderate chronic microvascular ischemic disease. Results from [facility] encounter on 04/23/16   CT HEAD WO CONT    Narrative CT Head    History: Headaches following head trauma. Comparison: No prior study available for comparison. Technique: Multiple axial CT images of the head were obtained extending from the  skull base to the vertex. Dose reduction techniques used: Automated exposure control, adjustment of the  mAs and/or kVp according to patient's size, and iterative reconstruction  techniques. Findings: There is no evidence of acute intracranial hemorrhage, mass effect, midline  shift, or herniation. The ventricles and sulci are unremarkable. There is  hypoattenuation of the periventricular white matter, likely on the basis of  chronic microvascular ischemic change. No definite CT evidence of acute  cortical infarct is seen. The gray-white matter differentiation is within  normal limits. The visualized orbits are unremarkable. The paranasal sinuses  and mastoid air cells are clear. No displaced skull fracture is identified. Impression IMPRESSION:  1. No acute intracranial hemorrhage, mass effect, midline shift, or herniation. 2. Mild senescent changes including periventricular white matter chronic  microvascular ischemic change. MRI Results (maximum last 3):   Results from [facility] encounter on 09/17/20   MRI LUMB SPINE WO CONT Narrative INDICATION:  Low back pain     EXAMINATION:  MRI LUMBAR SPINE without CONTRAST    COMPARISON: None    TECHNIQUE: MR imaging of the lumbar spine was performed with sagittal T1, T2,  STIR;  axial T1, T2.  NO CONTRAST ADMINISTERED    FINDINGS:    No significant lumbosacral malalignment. Sequelae of posterior spinal fusion at  L4-5. Vertebral body heights are well-maintained. No evidence for acute fracture  or focal destructive bone marrow process. The distal cord, conus medullaris, and cauda equina are unremarkable. T12-L1: No significant disc abnormality, central spinal canal stenosis, or  neural foraminal stenosis. L1/2: No significant disc abnormality, central spinal canal stenosis, or neural  foraminal stenosis. Mild facet arthropathy    L2/3: Small disc bulge causing no significant central spinal canal or neural  foraminal stenosis. Mild to moderate facet arthropathy    L3/4: Disc osteophyte complex without any significant central stenosis. Mild  left and minimal right neural foraminal stenosis. Moderate facet arthropathy    L4/5: Disc osteophyte complex without any significant central stenosis. Narrowing of the left lateral recess with mild left and no right neural  foraminal stenosis    L5/S1: Disc osteophyte complex without any significant central spinal canal or  neural foraminal stenosis. Mild to moderate facet arthropathy. Impression IMPRESSION:    No significant lumbosacral central stenosis. Left lateral recess stenosis at L4-5 with mild left neural foraminal narrowing. Mild left neural foraminal stenosis at L3-4. Please refer to above findings for complete details. Results from [facility] encounter on 09/08/20   MRI CERV SPINE W WO CONT    Narrative EXAM: MRI CERV SPINE W WO CONT    INDICATION: Neck pain M 53. 2. Lawrance Potter Cervicalgia. Recent hospital admission for right  MCA territory stroke.  Subsequent development of pain along right side of neck  and behind the ear with tingling and numbness in the left hand. COMPARISON: None    TECHNIQUE: MR imaging of the cervical spine was performed using the following  sequences: sagittal T1, T2, STIR;  axial T2, T1 prior to and following contrast  administration. CONTRAST: 13 mL of Dotarem. FINDINGS:    There is normal cord size, signal and enhancement. The visualized portions of  brainstem and cerebellum are shown no acute findings. No cervical arterial  occlusion or dissection is suggested, including of the right vertebral artery. There is normal vertebral body height. Mild type II discogenic endplate signal  changes are shown at C3-4 with otherwise normal bone signal. No osseous  enhancement abnormality is shown. There is equivocal retrolisthesis at C3-4. Alignment is otherwise anatomic. No paraspinal soft tissue mass or enhancement  abnormality is demonstrated. C2-C3: Normal disc height. Minimal central disc bulging. No facet arthropathy or  canal-foraminal stenosis. C3-C4: Moderate disc space narrowing. Mild-moderate diffuse disc osteophyte  complex formation, somewhat accentuated in the right uncovertebral region. No  substantial facet arthrosis. Mild canal stenosis without cord compression. No  substantial foraminal stenosis. C4-C5: Normal disc height. Minimal-mild central disc bulging. No facet  arthropathy or canal/foraminal stenosis. C5-C6: Mild disc space narrowing. Minimal-mild central disc bulging . No facet  arthropathy or canal-foraminal stenosis. C6-C7: Normal disc and facets. C7-T1: Normal disc and facets. T1-T2: Normal disc and facets. T2-T3 and T3-T4: Shown on sagittal images only. Normal discs and facets. Impression IMPRESSION:    1. Moderate diffuse disc osteophyte complex formation at C3-4 accentuated in  right uncovertebral region. Mild canal stenosis without cord compression. No  substantial foraminal stenosis.   2. Other degenerative findings that are mild in appearance as described in  detail in report. 3. Normal appearance of cord. Results from [facility] encounter on 07/22/20   MRI BRAIN WO CONT    Narrative EXAM: MRI BRAIN WO CONT    INDICATION: left hand paresthesia    COMPARISON: CTA head and neck 7/22/2020. CONTRAST: None. TECHNIQUE:    Multiplanar multisequence acquisition without contrast of the brain. FINDINGS:  Multiple small acute infarcts in the right frontoparietal white matter. No  evidence of acute hemorrhage. The ventricles are normal in size and position. Scattered periventricular deep  white matter T2/FLAIR hyperintensities, confluent in regions, and patchy  T2/FLAIR hyperintensity in the vamsi, consistent with moderate chronic  microvascular ischemic disease. Numerous chronic microhemorrhages in the  bilateral thalami, as well as a few additional chronic microhemorrhages in the  right basal ganglia, right temporal lobe, left vamsi and right cerebellum. There  is no cerebellar tonsillar herniation. Expected arterial flow-voids are present. Scattered mucosal thickening in the left posterior ethmoidal air cells and left  maxillary sinus without air-fluid level. The mastoid air cells and middle ears  are clear. The orbital contents are within normal limits. No significant osseous  or scalp lesions are identified. Impression IMPRESSION:     1. Multiple small acute infarcts in the right frontoparietal white matter. 2. Moderate chronic microvascular ischemic disease. Numerous chronic  microhemorrhages in the bilateral thalami, and few additional scattered  supratentorial and infratentorial chronic microhemorrhages as above.     23X           Assessment and Plan:   Liam Walker is a 76 y.o. female with hx of hypertension, hyperlipidemia and diabetes who presents to the hospital with right-sided neck pain found to have a right MCA stroke.       Right MCA stroke: Etiology secondary to intracranial atherosclerosis as her CTA showed right M1 stenosis. - Recommend maintaining SBP goal is less than 140/90 (this is the long term goal)   - would recommend to continue 81mg daily for secondary stroke prevention.  - Risk factor modification: Hemoglobin A1c 7.8% and .              - if LDL > 70, continue atorvastatin 40mg daily   - will check a lipid panel and hemoglobin A1c today  - will obtain a CTA head and neck to determine the progression of the stenosis. - Patient was counselled on stroke education and understands if has additional symptoms of slurred speech, weakness, sensation loss or vision loss to call 911  - Discussed with benefits of a low fat, low sodium diet with the addition of multiple servings of fruits and vegetables. Also discussed importance of physical activity, at least 30 min 3 times a day. - Counselled on the importance of medication compliance   -We extensively discussed the importance of diet and exercise and how her risk factors can worsen the atherosclerosis seen on her CTA. I did discuss that this could cause another stroke in the same territory if she does not work on her risk factors. Patient verbalized understanding     Neck pain: Unclear etiology however concerning for possible radiculopathy.  She does not have any evidence of hypo-or hyperreflexia on neurological exam. MRI C spine showed degenerative disc disease, is being followed by ortho, received injections in the neck with improvement   - refer to physical therapy for ongoing management. - placed a referral to pain management      Hypertension:  - Recommend maintaining SBP goal is less than 140/90 (this is the long term goal)   -PCP to manage antihypertensives     Diabetes: HbA1c 7.8%  - continue anti-glycemic agents.  will check hemoglobin A1c      Hyperlipidemia:   Continue atorvastatin 40 mg daily for an LDL goal of less than 70.     Patient Active Problem List   Diagnosis Code    Essential hypertension, benign I10    Mixed hyperlipidemia E78.2  Type II diabetes mellitus ([facility]) E11.9    Fibromyalgia M79.7    Dysthymic disorder F34.1    Depressive disorder, not elsewhere classified F32.9    Unspecified vitamin D deficiency E55.9    Insomnia G47.00    Sleep apnea G47.30    Stroke (cerebrum) ([facility]) I63.9            I have discussed the diagnosis with the patient and the intended plan as seen in the above orders. Patient is in agreement. The patient has received an after-visit summary and questions were answered concerning future plans. I have discussed medication side effects and warnings with the patient as well.         Signed By:  Jaquan Wagner MD     April 8, 2021

## 2021-04-08 NOTE — PATIENT INSTRUCTIONS
Learning About an Ischemic Stroke  What is an ischemic stroke? An ischemic (say \"bdu-HGS-gvsh\") stroke occurs when a blood clot blocks a blood vessel in the brain. This means that blood cannot flow to some part of the brain. Without blood and the oxygen it carries, this part of the brain starts to die. The part of the body controlled by the damaged area of the brain cannot work properly. This is different from a hemorrhagic (say \"nvc-yam-DU-markck\") stroke, which happens when a blood vessel in the brain has burst open or has started to leak. The brain damage from a stroke starts within minutes. Quick treatment can help limit damage to the brain and make recovery more likely. People who have had a stroke may have a hard time talking, understanding things, and making decisions. They may have to relearn daily activities, such as how to eat, bathe, and dress. How well someone recovers from a stroke depends on how quickly the person gets to the hospital, where in the brain the stroke happened, and how severe it was. Training and therapy also make a difference in how well people recover. What are the symptoms? If you have any of these symptoms, call 911 or other emergency services right away:   · You have symptoms of a stroke. These may include:  ? Sudden numbness, tingling, weakness, or loss of movement in your face, arm, or leg, especially on only one side of your body. ? Sudden vision changes. ? Sudden trouble speaking. ? Sudden confusion or trouble understanding simple statements. ? Sudden problems with walking or balance. ? A sudden, severe headache that is different from past headaches. See your doctor if you have symptoms that seem like a stroke, even if they go away quickly. You may have had a transient ischemic attack (TIA), sometimes called a mini-stroke. A TIA is a warning that a stroke may happen soon. Getting early treatment for a TIA can help prevent a stroke.   What causes an ischemic stroke? An ischemic stroke is caused by a blood clot that blocks blood flow in the brain. The most common causes of blood clots include:  · Hardening of the arteries (atherosclerosis). This is caused by high blood pressure, diabetes, high cholesterol, or smoking. · Atrial fibrillation. How is ischemic stroke treated? Emergency treatment may be done to restore blood flow to the brain using medicine or a procedure. You may take medicines to help prevent another stroke. Ask your doctor if a stroke rehab program is right for you. How can you prevent another stroke? · Work with your doctor to treat any health problems you have. High blood pressure, high cholesterol, atrial fibrillation, and diabetes all raise your chances of having a stroke. · Be safe with medicines. Take your medicine exactly as prescribed. Call your doctor if you think you are having a problem with your medicine. · Have a healthy lifestyle. ? Do not smoke or allow others to smoke around you. If you need help quitting, talk to your doctor about stop-smoking programs and medicines. These can increase your chances of quitting for good. Smoking makes a stroke more likely. ? Limit alcohol to 2 drinks a day for men and 1 drink a day for women. ? Lose weight if you need to. A healthy weight will help you keep your heart and body healthy. ? Be active. Ask your doctor what type and level of activity is safe for you.  ? Eat heart-healthy foods, like fruits, vegetables, and high-fiber foods. Follow-up care is a key part of your treatment and safety. Be sure to make and go to all appointments, and call your doctor if you are having problems. It's also a good idea to know your test results and keep a list of the medicines you take. Where can you learn more? Go to http://www.gray.com/  Enter D161 in the search box to learn more about \"Learning About an Ischemic Stroke. \"  Current as of: March 4, 2020               Content Version: 12.8  © 3836-2943 [employer]. Care instructions adapted under license by [employer] (which disclaims liability or warranty for this information). If you have questions about a medical condition or this instruction, always ask your healthcare professional. [address] any warranty or liability for your use of this information.

## 2021-05-06 ENCOUNTER — HOSPITAL ENCOUNTER (OUTPATIENT)
Dept: LAB | Age: 66
Discharge: HOME | End: 2021-05-06

## 2021-05-06 PROCEDURE — 85025 COMPLETE CBC W/AUTO DIFF WBC: CPT

## 2021-05-06 PROCEDURE — 83036 HEMOGLOBIN GLYCOSYLATED A1C: CPT

## 2021-05-06 PROCEDURE — 84439 ASSAY OF FREE THYROXINE: CPT

## 2021-05-06 PROCEDURE — 84443 ASSAY THYROID STIM HORMONE: CPT

## 2021-05-06 PROCEDURE — 80053 COMPREHEN METABOLIC PANEL: CPT

## 2021-05-06 PROCEDURE — 80061 LIPID PANEL: CPT

## 2021-05-07 LAB
ALBUMIN SERPL-MCNC: 3.8 G/DL (ref 3.5–5)
ALBUMIN/GLOB SERPL: 1 {RATIO} (ref 1.1–2.2)
ALP SERPL-CCNC: 86 U/L (ref 45–117)
ALT SERPL-CCNC: 13 U/L (ref 12–78)
ANION GAP SERPL CALC-SCNC: 7 MMOL/L (ref 5–15)
AST SERPL-CCNC: 12 U/L (ref 15–37)
BASOPHILS # BLD: 0.1 K/UL (ref 0–0.1)
BASOPHILS NFR BLD: 1 % (ref 0–1)
BILIRUB SERPL-MCNC: 0.2 MG/DL (ref 0.2–1)
BUN SERPL-MCNC: 16 MG/DL (ref 6–20)
BUN/CREAT SERPL: 20 (ref 12–20)
CALCIUM SERPL-MCNC: 10.3 MG/DL (ref 8.5–10.1)
CHLORIDE SERPL-SCNC: 98 MMOL/L (ref 97–108)
CHOLEST SERPL-MCNC: 233 MG/DL
CO2 SERPL-SCNC: 32 MMOL/L (ref 21–32)
CREAT SERPL-MCNC: 0.81 MG/DL (ref 0.55–1.02)
DIFFERENTIAL METHOD BLD: ABNORMAL
EOSINOPHIL # BLD: 0.1 K/UL (ref 0–0.4)
EOSINOPHIL NFR BLD: 1 % (ref 0–7)
ERYTHROCYTE [DISTWIDTH] IN BLOOD BY AUTOMATED COUNT: 13.4 % (ref 11.5–14.5)
EST. AVERAGE GLUCOSE BLD GHB EST-MCNC: 146 MG/DL
GLOBULIN SER CALC-MCNC: 3.9 G/DL (ref 2–4)
GLUCOSE SERPL-MCNC: 125 MG/DL (ref 65–100)
HBA1C MFR BLD: 6.7 % (ref 4–5.6)
HCT VFR BLD AUTO: 36.3 % (ref 35–47)
HDLC SERPL-MCNC: 56 MG/DL
HDLC SERPL: 4.2 {RATIO} (ref 0–5)
HGB BLD-MCNC: 11.9 G/DL (ref 11.5–16)
IMM GRANULOCYTES # BLD AUTO: 0 K/UL (ref 0–0.04)
IMM GRANULOCYTES NFR BLD AUTO: 0 % (ref 0–0.5)
LDLC SERPL CALC-MCNC: 148.2 MG/DL (ref 0–100)
LIPID PROFILE,FLP: ABNORMAL
LYMPHOCYTES # BLD: 3.6 K/UL (ref 0.8–3.5)
LYMPHOCYTES NFR BLD: 37 % (ref 12–49)
MCH RBC QN AUTO: 29.9 PG (ref 26–34)
MCHC RBC AUTO-ENTMCNC: 32.8 G/DL (ref 30–36.5)
MCV RBC AUTO: 91.2 FL (ref 80–99)
MONOCYTES # BLD: 0.7 K/UL (ref 0–1)
MONOCYTES NFR BLD: 8 % (ref 5–13)
NEUTS SEG # BLD: 5.2 K/UL (ref 1.8–8)
NEUTS SEG NFR BLD: 53 % (ref 32–75)
NRBC # BLD: 0 K/UL (ref 0–0.01)
NRBC BLD-RTO: 0 PER 100 WBC
PLATELET # BLD AUTO: 442 K/UL (ref 150–400)
PMV BLD AUTO: 9.7 FL (ref 8.9–12.9)
POTASSIUM SERPL-SCNC: 4.7 MMOL/L (ref 3.5–5.1)
PROT SERPL-MCNC: 7.7 G/DL (ref 6.4–8.2)
RBC # BLD AUTO: 3.98 M/UL (ref 3.8–5.2)
SODIUM SERPL-SCNC: 137 MMOL/L (ref 136–145)
T4 FREE SERPL-MCNC: 1 NG/DL (ref 0.8–1.5)
TRIGL SERPL-MCNC: 144 MG/DL (ref ?–150)
TSH SERPL DL<=0.05 MIU/L-ACNC: 1.59 UIU/ML (ref 0.36–3.74)
VLDLC SERPL CALC-MCNC: 28.8 MG/DL
WBC # BLD AUTO: 9.7 K/UL (ref 3.6–11)

## 2021-09-09 ENCOUNTER — HOSPITAL ENCOUNTER (OUTPATIENT)
Dept: LAB | Age: 66
Discharge: HOME | End: 2021-09-09

## 2021-09-09 PROCEDURE — 85025 COMPLETE CBC W/AUTO DIFF WBC: CPT

## 2021-09-09 PROCEDURE — 83036 HEMOGLOBIN GLYCOSYLATED A1C: CPT

## 2021-09-09 PROCEDURE — 80053 COMPREHEN METABOLIC PANEL: CPT

## 2021-09-09 PROCEDURE — 80061 LIPID PANEL: CPT

## 2021-09-10 LAB
ALBUMIN SERPL-MCNC: 3.8 G/DL (ref 3.5–5)
ALBUMIN/GLOB SERPL: 1 {RATIO} (ref 1.1–2.2)
ALP SERPL-CCNC: 82 U/L (ref 45–117)
ALT SERPL-CCNC: 14 U/L (ref 12–78)
ANION GAP SERPL CALC-SCNC: 7 MMOL/L (ref 5–15)
AST SERPL-CCNC: 15 U/L (ref 15–37)
BASOPHILS # BLD: 0.1 K/UL (ref 0–0.1)
BASOPHILS NFR BLD: 1 % (ref 0–1)
BILIRUB SERPL-MCNC: 0.3 MG/DL (ref 0.2–1)
BUN SERPL-MCNC: 12 MG/DL (ref 6–20)
BUN/CREAT SERPL: 12 (ref 12–20)
CALCIUM SERPL-MCNC: 9.9 MG/DL (ref 8.5–10.1)
CHLORIDE SERPL-SCNC: 101 MMOL/L (ref 97–108)
CHOLEST SERPL-MCNC: 269 MG/DL
CO2 SERPL-SCNC: 32 MMOL/L (ref 21–32)
CREAT SERPL-MCNC: 0.97 MG/DL (ref 0.55–1.02)
DIFFERENTIAL METHOD BLD: ABNORMAL
EOSINOPHIL # BLD: 0.1 K/UL (ref 0–0.4)
EOSINOPHIL NFR BLD: 1 % (ref 0–7)
ERYTHROCYTE [DISTWIDTH] IN BLOOD BY AUTOMATED COUNT: 14 % (ref 11.5–14.5)
EST. AVERAGE GLUCOSE BLD GHB EST-MCNC: 151 MG/DL
GLOBULIN SER CALC-MCNC: 3.8 G/DL (ref 2–4)
GLUCOSE SERPL-MCNC: 142 MG/DL (ref 65–100)
HBA1C MFR BLD: 6.9 % (ref 4–5.6)
HCT VFR BLD AUTO: 39 % (ref 35–47)
HDLC SERPL-MCNC: 61 MG/DL
HDLC SERPL: 4.4 {RATIO} (ref 0–5)
HGB BLD-MCNC: 12.5 G/DL (ref 11.5–16)
IMM GRANULOCYTES # BLD AUTO: 0 K/UL (ref 0–0.04)
IMM GRANULOCYTES NFR BLD AUTO: 0 % (ref 0–0.5)
LDLC SERPL CALC-MCNC: 174.2 MG/DL (ref 0–100)
LYMPHOCYTES # BLD: 2.3 K/UL (ref 0.8–3.5)
LYMPHOCYTES NFR BLD: 25 % (ref 12–49)
MCH RBC QN AUTO: 29.4 PG (ref 26–34)
MCHC RBC AUTO-ENTMCNC: 32.1 G/DL (ref 30–36.5)
MCV RBC AUTO: 91.8 FL (ref 80–99)
MONOCYTES # BLD: 0.7 K/UL (ref 0–1)
MONOCYTES NFR BLD: 8 % (ref 5–13)
NEUTS SEG # BLD: 6.1 K/UL (ref 1.8–8)
NEUTS SEG NFR BLD: 65 % (ref 32–75)
NRBC # BLD: 0 K/UL (ref 0–0.01)
NRBC BLD-RTO: 0 PER 100 WBC
PLATELET # BLD AUTO: 432 K/UL (ref 150–400)
PMV BLD AUTO: 10.3 FL (ref 8.9–12.9)
POTASSIUM SERPL-SCNC: 4.2 MMOL/L (ref 3.5–5.1)
PROT SERPL-MCNC: 7.6 G/DL (ref 6.4–8.2)
RBC # BLD AUTO: 4.25 M/UL (ref 3.8–5.2)
SODIUM SERPL-SCNC: 140 MMOL/L (ref 136–145)
TRIGL SERPL-MCNC: 169 MG/DL (ref ?–150)
VLDLC SERPL CALC-MCNC: 33.8 MG/DL
WBC # BLD AUTO: 9.3 K/UL (ref 3.6–11)

## 2021-09-25 ENCOUNTER — HOSPITAL ENCOUNTER (OUTPATIENT)
Dept: MAMMOGRAPHY | Age: 66
Discharge: HOME | End: 2021-09-25
Attending: FAMILY MEDICINE
Payer: MEDICARE

## 2021-09-25 DIAGNOSIS — Z12.31 VISIT FOR SCREENING MAMMOGRAM: ICD-10-CM

## 2021-09-25 PROCEDURE — 77063 BREAST TOMOSYNTHESIS BI: CPT

## 2022-02-23 ENCOUNTER — HOSPITAL ENCOUNTER (OUTPATIENT)
Dept: GENERAL RADIOLOGY | Age: 67
Discharge: HOME | End: 2022-02-23
Attending: NURSE PRACTITIONER
Payer: COMMERCIAL

## 2022-02-23 DIAGNOSIS — Z78.0 ASYMPTOMATIC MENOPAUSAL STATE: ICD-10-CM

## 2022-02-23 PROCEDURE — 77080 DXA BONE DENSITY AXIAL: CPT

## 2022-03-19 PROBLEM — I63.9 STROKE (CEREBRUM) (HCC): Status: ACTIVE | Noted: 2020-07-22

## 2022-10-28 ENCOUNTER — HOSPITAL ENCOUNTER (OUTPATIENT)
Dept: MAMMOGRAPHY | Age: 67
Discharge: HOME | End: 2022-10-28
Attending: NURSE PRACTITIONER
Payer: COMMERCIAL

## 2022-10-28 VITALS — BODY MASS INDEX: 26.07 KG/M2 | WEIGHT: 138 LBS

## 2022-10-28 DIAGNOSIS — Z12.31 ENCOUNTER FOR SCREENING MAMMOGRAM FOR MALIGNANT NEOPLASM OF BREAST: ICD-10-CM

## 2022-10-28 PROCEDURE — 77063 BREAST TOMOSYNTHESIS BI: CPT

## 2022-11-06 ENCOUNTER — TRANSCRIBE ORDER (OUTPATIENT)
Dept: SCHEDULING | Age: 67
End: 2022-11-06

## 2022-11-08 ENCOUNTER — TRANSCRIBE ORDER (OUTPATIENT)
Dept: REGISTRATION | Age: 67
End: 2022-11-08

## 2022-11-08 ENCOUNTER — HOSPITAL ENCOUNTER (OUTPATIENT)
Dept: GENERAL RADIOLOGY | Age: 67
Discharge: HOME | End: 2022-11-08
Payer: COMMERCIAL

## 2022-11-08 DIAGNOSIS — M54.31 SCIATICA, RIGHT SIDE: ICD-10-CM

## 2022-11-08 DIAGNOSIS — M54.50 LOW BACK PAIN, UNSPECIFIED: Primary | ICD-10-CM

## 2022-11-08 DIAGNOSIS — M54.50 LOW BACK PAIN, UNSPECIFIED: ICD-10-CM

## 2022-11-08 PROCEDURE — 73502 X-RAY EXAM HIP UNI 2-3 VIEWS: CPT

## 2022-11-08 PROCEDURE — 72110 X-RAY EXAM L-2 SPINE 4/>VWS: CPT

## 2023-06-18 ENCOUNTER — HOSPITAL ENCOUNTER (EMERGENCY)
Facility: HOSPITAL | Age: 68
Discharge: HOME | End: 2023-06-18
Attending: EMERGENCY MEDICINE
Payer: MEDICARE

## 2023-06-18 ENCOUNTER — APPOINTMENT (OUTPATIENT)
Facility: HOSPITAL | Age: 68
End: 2023-06-18
Payer: MEDICARE

## 2023-06-18 VITALS
TEMPERATURE: 98.4 F | BODY MASS INDEX: 24.84 KG/M2 | RESPIRATION RATE: 16 BRPM | OXYGEN SATURATION: 96 % | HEART RATE: 81 BPM | WEIGHT: 135 LBS | HEIGHT: 62 IN | DIASTOLIC BLOOD PRESSURE: 82 MMHG | SYSTOLIC BLOOD PRESSURE: 127 MMHG
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
## 2023-06-18 DIAGNOSIS — M54.41 ACUTE RIGHT-SIDED LOW BACK PAIN WITH RIGHT-SIDED SCIATICA: ICD-10-CM

## 2023-06-18 DIAGNOSIS — S39.012A STRAIN OF LUMBAR REGION, INITIAL ENCOUNTER: Primary | ICD-10-CM

## 2023-06-18 PROCEDURE — 72110 X-RAY EXAM L-2 SPINE 4/>VWS: CPT

## 2023-06-18 PROCEDURE — 6370000000 HC RX 637 (ALT 250 FOR IP): Performed by: EMERGENCY MEDICINE

## 2023-06-18 PROCEDURE — 99283 EMERGENCY DEPT VISIT LOW MDM: CPT

## 2023-06-18 RX ORDER — HYDROCODONE BITARTRATE AND ACETAMINOPHEN 5; 325 MG/1; MG/1
1 TABLET ORAL
Status: COMPLETED | OUTPATIENT
Start: 2023-06-18 | End: 2023-06-18

## 2023-06-18 RX ORDER — TRAMADOL HYDROCHLORIDE 50 MG/1
50 TABLET ORAL ONCE
Status: COMPLETED | OUTPATIENT
Start: 2023-06-18 | End: 2023-06-18

## 2023-06-18 RX ORDER — TRAMADOL HYDROCHLORIDE 50 MG/1
50 TABLET ORAL EVERY 6 HOURS PRN
Qty: 12 TABLET | Refills: 0 | Status: SHIPPED | OUTPATIENT
Start: 2023-06-18 | End: 2023-06-21

## 2023-06-18 RX ADMIN — HYDROCODONE BITARTRATE AND ACETAMINOPHEN 1 TABLET: 5; 325 TABLET ORAL at 18:01

## 2023-06-18 RX ADMIN — TRAMADOL HYDROCHLORIDE 50 MG: 50 TABLET ORAL at 18:57

## 2023-06-18 ASSESSMENT — PAIN DESCRIPTION - ORIENTATION: ORIENTATION: LOWER;RIGHT

## 2023-06-18 ASSESSMENT — PAIN - FUNCTIONAL ASSESSMENT
PAIN_FUNCTIONAL_ASSESSMENT: 0-10

## 2023-06-18 ASSESSMENT — ENCOUNTER SYMPTOMS
ABDOMINAL PAIN: 0
COUGH: 0
BACK PAIN: 1
DIARRHEA: 0
VOMITING: 0
COLOR CHANGE: 0

## 2023-06-18 ASSESSMENT — PAIN SCALES - GENERAL
PAINLEVEL_OUTOF10: 2
PAINLEVEL_OUTOF10: 6
PAINLEVEL_OUTOF10: 6
PAINLEVEL_OUTOF10: 0

## 2023-06-18 ASSESSMENT — PAIN DESCRIPTION - LOCATION
LOCATION: LEG;BACK
LOCATION: BACK

## 2023-06-18 ASSESSMENT — LIFESTYLE VARIABLES: HOW OFTEN DO YOU HAVE A DRINK CONTAINING ALCOHOL: MONTHLY OR LESS

## 2023-11-26 ENCOUNTER — HOSPITAL ENCOUNTER (EMERGENCY)
Facility: HOSPITAL | Age: 68
Discharge: HOME | End: 2023-11-26
Attending: FAMILY MEDICINE | Admitting: FAMILY MEDICINE
Payer: MEDICARE

## 2023-11-26 VITALS
DIASTOLIC BLOOD PRESSURE: 86 MMHG | SYSTOLIC BLOOD PRESSURE: 147 MMHG | HEART RATE: 72 BPM | TEMPERATURE: 98.4 F | BODY MASS INDEX: 27.56 KG/M2 | HEIGHT: 61 IN | RESPIRATION RATE: 14 BRPM | WEIGHT: 146 LBS | OXYGEN SATURATION: 98 %

## 2023-11-26 DIAGNOSIS — M54.31 SCIATICA OF RIGHT SIDE: Primary | ICD-10-CM

## 2023-11-26 LAB
APPEARANCE UR: CLEAR
BACTERIA URNS QL MICRO: NEGATIVE /HPF
BILIRUB UR QL: NEGATIVE
COLOR UR: ABNORMAL
EPITH CASTS URNS QL MICRO: ABNORMAL /LPF
GLUCOSE UR STRIP.AUTO-MCNC: NEGATIVE MG/DL
HGB UR QL STRIP: NEGATIVE
KETONES UR QL STRIP.AUTO: NEGATIVE MG/DL
LEUKOCYTE ESTERASE UR QL STRIP.AUTO: NEGATIVE
NITRITE UR QL STRIP.AUTO: NEGATIVE
PH UR STRIP: 7 (ref 5–8)
PROT UR STRIP-MCNC: ABNORMAL MG/DL
RBC #/AREA URNS HPF: ABNORMAL /HPF (ref 0–5)
SP GR UR REFRACTOMETRY: 1.02 (ref 1–1.03)
URINE CULTURE IF INDICATED: ABNORMAL
UROBILINOGEN UR QL STRIP.AUTO: 0.2 EU/DL (ref 0.2–1)
WBC URNS QL MICRO: ABNORMAL /HPF (ref 0–4)

## 2023-11-26 PROCEDURE — 6360000002 HC RX W HCPCS: Performed by: FAMILY MEDICINE

## 2023-11-26 PROCEDURE — 81001 URINALYSIS AUTO W/SCOPE: CPT

## 2023-11-26 PROCEDURE — 99284 EMERGENCY DEPT VISIT MOD MDM: CPT

## 2023-11-26 PROCEDURE — 96374 THER/PROPH/DIAG INJ IV PUSH: CPT

## 2023-11-26 PROCEDURE — 6370000000 HC RX 637 (ALT 250 FOR IP): Performed by: FAMILY MEDICINE

## 2023-11-26 PROCEDURE — 96375 TX/PRO/DX INJ NEW DRUG ADDON: CPT

## 2023-11-26 RX ORDER — ONDANSETRON 2 MG/ML
4 INJECTION INTRAMUSCULAR; INTRAVENOUS ONCE
Status: COMPLETED | OUTPATIENT
Start: 2023-11-26 | End: 2023-11-26

## 2023-11-26 RX ORDER — DIPHENHYDRAMINE HCL 25 MG
25 CAPSULE ORAL
Status: COMPLETED | OUTPATIENT
Start: 2023-11-26 | End: 2023-11-26

## 2023-11-26 RX ORDER — HYDROCODONE BITARTRATE AND ACETAMINOPHEN 5; 325 MG/1; MG/1
1 TABLET ORAL EVERY 6 HOURS PRN
Qty: 12 TABLET | Refills: 0 | Status: SHIPPED | OUTPATIENT
Start: 2023-11-26 | End: 2023-11-29

## 2023-11-26 RX ORDER — MORPHINE SULFATE 15 MG/1
15 TABLET ORAL EVERY 6 HOURS PRN
Qty: 12 TABLET | Refills: 0 | Status: SHIPPED | OUTPATIENT
Start: 2023-11-26 | End: 2023-11-26

## 2023-11-26 RX ORDER — ONDANSETRON 4 MG/1
4 TABLET, ORALLY DISINTEGRATING ORAL ONCE
Status: COMPLETED | OUTPATIENT
Start: 2023-11-26 | End: 2023-11-26

## 2023-11-26 RX ORDER — HYDROCODONE BITARTRATE AND ACETAMINOPHEN 5; 325 MG/1; MG/1
1 TABLET ORAL
Status: COMPLETED | OUTPATIENT
Start: 2023-11-26 | End: 2023-11-26

## 2023-11-26 RX ORDER — ONDANSETRON 4 MG/1
4 TABLET, ORALLY DISINTEGRATING ORAL 3 TIMES DAILY PRN
Qty: 21 TABLET | Refills: 0 | Status: SHIPPED | OUTPATIENT
Start: 2023-11-26

## 2023-11-26 RX ORDER — MORPHINE SULFATE 15 MG/1
15 TABLET ORAL
Status: DISCONTINUED | OUTPATIENT
Start: 2023-11-26 | End: 2023-11-26 | Stop reason: HOSPADM

## 2023-11-26 RX ORDER — MORPHINE SULFATE 4 MG/ML
4 INJECTION, SOLUTION INTRAMUSCULAR; INTRAVENOUS
Status: COMPLETED | OUTPATIENT
Start: 2023-11-26 | End: 2023-11-26

## 2023-11-26 RX ADMIN — MORPHINE SULFATE 4 MG: 4 INJECTION, SOLUTION INTRAMUSCULAR; INTRAVENOUS at 16:43

## 2023-11-26 RX ADMIN — HYDROCODONE BITARTRATE AND ACETAMINOPHEN 1 TABLET: 5; 325 TABLET ORAL at 18:40

## 2023-11-26 RX ADMIN — ONDANSETRON 4 MG: 4 TABLET, ORALLY DISINTEGRATING ORAL at 18:41

## 2023-11-26 RX ADMIN — DIPHENHYDRAMINE HYDROCHLORIDE 25 MG: 25 CAPSULE ORAL at 18:28

## 2023-11-26 RX ADMIN — ONDANSETRON 4 MG: 2 INJECTION INTRAMUSCULAR; INTRAVENOUS at 16:44

## 2023-11-26 ASSESSMENT — PAIN DESCRIPTION - ORIENTATION: ORIENTATION: RIGHT

## 2023-11-26 ASSESSMENT — LIFESTYLE VARIABLES: HOW OFTEN DO YOU HAVE A DRINK CONTAINING ALCOHOL: MONTHLY OR LESS

## 2023-11-26 ASSESSMENT — PAIN SCALES - GENERAL
PAINLEVEL_OUTOF10: 4
PAINLEVEL_OUTOF10: 10
PAINLEVEL_OUTOF10: 8
PAINLEVEL_OUTOF10: 3

## 2023-11-26 ASSESSMENT — PAIN DESCRIPTION - DESCRIPTORS: DESCRIPTORS: ACHING

## 2023-11-26 ASSESSMENT — PAIN - FUNCTIONAL ASSESSMENT: PAIN_FUNCTIONAL_ASSESSMENT: 0-10

## 2023-11-26 NOTE — ED PROVIDER NOTES
ASK your doctor about these medications      acetaminophen 500 MG tablet  Commonly known as: TYLENOL     * albuterol (2.5 MG/3ML) 0.083% nebulizer solution  Commonly known as: PROVENTIL     * albuterol sulfate  (90 Base) MCG/ACT inhaler  Commonly known as: PROVENTIL;VENTOLIN;PROAIR     ALPRAZolam 1 MG tablet  Commonly known as: XANAX     amLODIPine 10 MG tablet  Commonly known as: NORVASC     atorvastatin 10 MG tablet  Commonly known as: LIPITOR     baclofen 10 MG tablet  Commonly known as: LIORESAL     Cholecalciferol 50 MCG (2000 UT) Tabs     losartan 100 MG tablet  Commonly known as: COZAAR     metFORMIN 1000 MG tablet  Commonly known as: GLUCOPHAGE     SITagliptin 100 MG tablet  Commonly known as: JANUVIA     tiZANidine 2 MG tablet  Commonly known as: Jorge            * This list has 2 medication(s) that are the same as other medications prescribed for you. Read the directions carefully, and ask your doctor or other care provider to review them with you. Where to Get Your Medications        These medications were sent to [address], [address]   [facility]      Phone: [phone]   HYDROcodone-acetaminophen 5-325 MG per tablet  ondansetron 4 MG disintegrating tablet           DISCONTINUED MEDICATIONS:  Discharge Medication List as of 11/26/2023  6:27 PM          I am the Primary Clinician of Record. Deborah Mckoy MD (electronically signed)      (Please note that parts of this dictation were completed with voice recognition software. Quite often unanticipated grammatical, syntax, homophones, and other interpretive errors are inadvertently transcribed by the computer software. Please disregards these errors.  Please excuse any errors that have escaped final proofreading.)         Deborah Mckoy MD  11/27/23 1708

## 2023-11-26 NOTE — ED NOTES
While getting over to wheelchair, patient developed hive under eye. MD made aware and at home dispensed medication wasted.      Leopold Flake, RN  11/26/23 2448

## 2023-11-26 NOTE — ED NOTES
Discharge instructions and medications reviewed with Pt and family after medication change done by MD (see MAR). Pt acknowledges changes and discharged home with family.        Margarito Rodriguez RN  11/26/23 8397

## 2023-11-26 NOTE — ED NOTES
Now  c/o  mild RLQ/suprapubic pain yesterday and intermittent nausea     Ana Perrin RN  11/26/23 6774

## 2023-11-26 NOTE — ED NOTES
Discharge instructions and medications reviewed with Pt and family member. Pt dispensed home with medication. No questions at this time.       Reena Mendoza RN  11/26/23 2073

## 2023-11-26 NOTE — DISCHARGE INSTRUCTIONS
--Tylenol 1000 mg every 6 hours as needed for pain. --Norco 1 tab every 4 to 6 hours as needed for severe pain. --Ondansetron 4 mg every 6 hours as needed for nausea. --Miralax 17 grams daily. --If you need Milk of magnesia, take 30 ml every 4 hours as needed for constipation.

## 2023-11-26 NOTE — ED TRIAGE NOTES
Right sided sciatica pain radiating  down leg, seen by PCP and given \" a shot\" with minimal relief .

## 2023-11-28 ENCOUNTER — TRANSCRIBE ORDERS (OUTPATIENT)
Facility: HOSPITAL | Age: 68
End: 2023-11-28

## 2023-11-28 DIAGNOSIS — M54.31 SCIATICA OF RIGHT SIDE: Primary | ICD-10-CM

## 2024-03-14 ENCOUNTER — TRANSCRIBE ORDERS (OUTPATIENT)
Facility: HOSPITAL | Age: 69
End: 2024-03-14

## 2024-03-14 DIAGNOSIS — Z78.0 ASYMPTOMATIC MENOPAUSAL STATE: Primary | ICD-10-CM

## 2024-03-14 DIAGNOSIS — Z12.31 ENCOUNTER FOR SCREENING MAMMOGRAM FOR MALIGNANT NEOPLASM OF BREAST: ICD-10-CM

## 2024-03-21 ENCOUNTER — HOSPITAL ENCOUNTER (EMERGENCY)
Facility: HOSPITAL | Age: 69
Discharge: HOME | End: 2024-03-21
Attending: EMERGENCY MEDICINE
Payer: MEDICARE

## 2024-03-21 ENCOUNTER — HOSPITAL ENCOUNTER (OUTPATIENT)
Facility: HOSPITAL | Age: 69
End: 2024-03-21
Payer: MEDICARE

## 2024-03-21 VITALS
HEART RATE: 89 BPM | TEMPERATURE: 98 F | RESPIRATION RATE: 17 BRPM | OXYGEN SATURATION: 99 % | WEIGHT: 140 LBS | BODY MASS INDEX: 26.43 KG/M2 | SYSTOLIC BLOOD PRESSURE: 182 MMHG | DIASTOLIC BLOOD PRESSURE: 104 MMHG | HEIGHT: 61 IN

## 2024-03-21 DIAGNOSIS — M54.16 LUMBAR RADICULOPATHY: ICD-10-CM

## 2024-03-21 DIAGNOSIS — L29.9 GENERALIZED PRURITUS: Primary | ICD-10-CM

## 2024-03-21 PROCEDURE — 72158 MRI LUMBAR SPINE W/O & W/DYE: CPT

## 2024-03-21 PROCEDURE — 99283 EMERGENCY DEPT VISIT LOW MDM: CPT

## 2024-03-21 PROCEDURE — A9579 GAD-BASE MR CONTRAST NOS,1ML: HCPCS | Performed by: SPECIALIST

## 2024-03-21 PROCEDURE — 6360000004 HC RX CONTRAST MEDICATION: Performed by: SPECIALIST

## 2024-03-21 PROCEDURE — 6370000000 HC RX 637 (ALT 250 FOR IP): Performed by: EMERGENCY MEDICINE

## 2024-03-21 RX ORDER — DIPHENHYDRAMINE HCL 25 MG
50 CAPSULE ORAL
Status: COMPLETED | OUTPATIENT
Start: 2024-03-21 | End: 2024-03-21

## 2024-03-21 RX ORDER — EPINEPHRINE 0.3 MG/.3ML
0.3 INJECTION SUBCUTANEOUS PRN
Qty: 1 EACH | Refills: 0 | Status: SHIPPED | OUTPATIENT
Start: 2024-03-21

## 2024-03-21 RX ORDER — PREDNISONE 20 MG/1
40 TABLET ORAL DAILY
Qty: 8 TABLET | Refills: 0 | Status: SHIPPED | OUTPATIENT
Start: 2024-03-21 | End: 2024-03-25

## 2024-03-21 RX ORDER — PREDNISONE 20 MG/1
40 TABLET ORAL ONCE
Status: COMPLETED | OUTPATIENT
Start: 2024-03-21 | End: 2024-03-21

## 2024-03-21 RX ORDER — CETIRIZINE HYDROCHLORIDE 10 MG/1
10 TABLET ORAL DAILY
Qty: 30 TABLET | Refills: 0 | Status: SHIPPED | OUTPATIENT
Start: 2024-03-21

## 2024-03-21 RX ADMIN — GADOTERIDOL 5 ML: 279.3 INJECTION, SOLUTION INTRAVENOUS at 08:48

## 2024-03-21 RX ADMIN — DIPHENHYDRAMINE HYDROCHLORIDE 50 MG: 25 CAPSULE ORAL at 10:08

## 2024-03-21 RX ADMIN — PREDNISONE 40 MG: 20 TABLET ORAL at 10:08

## 2024-03-21 ASSESSMENT — LIFESTYLE VARIABLES
HOW OFTEN DO YOU HAVE A DRINK CONTAINING ALCOHOL: MONTHLY OR LESS
HOW MANY STANDARD DRINKS CONTAINING ALCOHOL DO YOU HAVE ON A TYPICAL DAY: 1 OR 2

## 2024-03-21 ASSESSMENT — PAIN - FUNCTIONAL ASSESSMENT: PAIN_FUNCTIONAL_ASSESSMENT: NONE - DENIES PAIN

## 2024-03-21 NOTE — ED PROVIDER NOTES
EMERGENCY DEPARTMENT HISTORY AND PHYSICAL EXAM      Date: 3/21/2024  Patient Name: Effie Melchor    History of Presenting Illness     Chief Complaint   Patient presents with    Pruritis    Allergic Reaction       History Provided By: Patient    HPI: Effie Melchor, 68 y.o. female with PMHx as noted below presents the emergency department for evaluation of pruritus.  Patient was noted MRI done earlier today with contrast, shortly after receiving medication she states she felt itchy all over.  Patient states the itching has persisted.  Denies any other symptoms.  No oral swelling, no GI upset    PCP: Kell Zepeda, SANDEEP - NP    No current facility-administered medications for this encounter.     Current Outpatient Medications   Medication Sig Dispense Refill    cetirizine (ZYRTEC) 10 MG tablet Take 1 tablet by mouth daily 30 tablet 0    predniSONE (DELTASONE) 20 MG tablet Take 2 tablets by mouth daily for 4 days 8 tablet 0    EPINEPHrine (EPIPEN 2-MAL) 0.3 MG/0.3ML SOAJ injection Inject 0.3 mLs into the muscle as needed (anaphylaxis) Use as directed for allergic reaction 1 each 0    ondansetron (ZOFRAN-ODT) 4 MG disintegrating tablet Take 1 tablet by mouth 3 times daily as needed for Nausea or Vomiting 21 tablet 0    acetaminophen (TYLENOL) 500 MG tablet Take by mouth every 6 hours as needed      albuterol sulfate HFA (PROVENTIL;VENTOLIN;PROAIR) 108 (90 Base) MCG/ACT inhaler Inhale 1 puff into the lungs every 4 hours as needed      albuterol (PROVENTIL) (2.5 MG/3ML) 0.083% nebulizer solution Inhale 2.5 mg into the lungs every 4 hours as needed      ALPRAZolam (XANAX) 1 MG tablet Take 1 mg by mouth 2 times daily as needed.      amLODIPine (NORVASC) 10 MG tablet Take 10 mg by mouth daily      atorvastatin (LIPITOR) 10 MG tablet TAKE ONE TABLET BY MOUTH ONCE DAILY      baclofen (LIORESAL) 10 MG tablet Take 5 mg by mouth 2 times daily      Cholecalciferol 50 MCG (2000 UT) TABS Take by mouth daily

## 2024-03-21 NOTE — ED TRIAGE NOTES
Pt presents with itching to her head and scalp after MRI contrast that was injected @ 0800 this morning. Pt has not taken any meds for this. Denies any airway involvement or throat irritation.

## 2024-03-22 ENCOUNTER — HOSPITAL ENCOUNTER (OUTPATIENT)
Facility: HOSPITAL | Age: 69
End: 2024-03-22
Payer: MEDICARE

## 2024-03-22 DIAGNOSIS — Z78.0 ASYMPTOMATIC MENOPAUSAL STATE: ICD-10-CM

## 2024-03-22 DIAGNOSIS — Z12.31 ENCOUNTER FOR SCREENING MAMMOGRAM FOR MALIGNANT NEOPLASM OF BREAST: ICD-10-CM

## 2024-03-22 PROCEDURE — 77080 DXA BONE DENSITY AXIAL: CPT

## 2024-03-22 PROCEDURE — 77063 BREAST TOMOSYNTHESIS BI: CPT

## 2024-04-28 ENCOUNTER — APPOINTMENT (OUTPATIENT)
Facility: HOSPITAL | Age: 69
End: 2024-04-28
Payer: MEDICARE

## 2024-04-28 ENCOUNTER — HOSPITAL ENCOUNTER (EMERGENCY)
Facility: HOSPITAL | Age: 69
Discharge: HOME | End: 2024-04-28
Attending: EMERGENCY MEDICINE
Payer: MEDICARE

## 2024-04-28 VITALS
OXYGEN SATURATION: 96 % | TEMPERATURE: 98.2 F | RESPIRATION RATE: 19 BRPM | DIASTOLIC BLOOD PRESSURE: 98 MMHG | WEIGHT: 140 LBS | HEART RATE: 96 BPM | SYSTOLIC BLOOD PRESSURE: 143 MMHG | HEIGHT: 61 IN | BODY MASS INDEX: 26.43 KG/M2

## 2024-04-28 DIAGNOSIS — J02.0 STREP PHARYNGITIS: Primary | ICD-10-CM

## 2024-04-28 LAB
ALBUMIN SERPL-MCNC: 2.9 G/DL (ref 3.5–5)
ALBUMIN/GLOB SERPL: 0.6 (ref 1.1–2.2)
ALP SERPL-CCNC: 123 U/L (ref 45–117)
ALT SERPL-CCNC: 22 U/L (ref 12–78)
ANION GAP SERPL CALC-SCNC: 9 MMOL/L (ref 5–15)
AST SERPL-CCNC: 25 U/L (ref 15–37)
BASOPHILS # BLD: 0 K/UL (ref 0–0.1)
BASOPHILS NFR BLD: 0 % (ref 0–1)
BILIRUB SERPL-MCNC: 0.3 MG/DL (ref 0.2–1)
BUN SERPL-MCNC: 26 MG/DL (ref 6–20)
BUN/CREAT SERPL: 22 (ref 12–20)
CALCIUM SERPL-MCNC: 9.8 MG/DL (ref 8.5–10.1)
CHLORIDE SERPL-SCNC: 95 MMOL/L (ref 97–108)
CO2 SERPL-SCNC: 31 MMOL/L (ref 21–32)
CREAT SERPL-MCNC: 1.17 MG/DL (ref 0.55–1.02)
DEPRECATED S PYO AG THROAT QL EIA: POSITIVE
DIFFERENTIAL METHOD BLD: ABNORMAL
EOSINOPHIL # BLD: 0 K/UL (ref 0–0.4)
EOSINOPHIL NFR BLD: 0 % (ref 0–7)
ERYTHROCYTE [DISTWIDTH] IN BLOOD BY AUTOMATED COUNT: 14.9 % (ref 11.5–14.5)
FLUAV RNA SPEC QL NAA+PROBE: NOT DETECTED
FLUBV RNA SPEC QL NAA+PROBE: NOT DETECTED
GLOBULIN SER CALC-MCNC: 5.1 G/DL (ref 2–4)
GLUCOSE SERPL-MCNC: 147 MG/DL (ref 65–100)
HCT VFR BLD AUTO: 34.5 % (ref 35–47)
HGB BLD-MCNC: 11.4 G/DL (ref 11.5–16)
IMM GRANULOCYTES # BLD AUTO: 0 K/UL (ref 0–0.04)
IMM GRANULOCYTES NFR BLD AUTO: 0 % (ref 0–0.5)
LYMPHOCYTES # BLD: 4.2 K/UL (ref 0.8–3.5)
LYMPHOCYTES NFR BLD: 16 % (ref 12–49)
MAGNESIUM SERPL-MCNC: 1.5 MG/DL (ref 1.6–2.4)
MCH RBC QN AUTO: 27.7 PG (ref 26–34)
MCHC RBC AUTO-ENTMCNC: 33 G/DL (ref 30–36.5)
MCV RBC AUTO: 83.9 FL (ref 80–99)
MONOCYTES # BLD: 1.8 K/UL (ref 0–1)
MONOCYTES NFR BLD: 7 % (ref 5–13)
NEUTS BAND NFR BLD MANUAL: 3 %
NEUTS SEG # BLD: 20 K/UL (ref 1.8–8)
NEUTS SEG NFR BLD: 74 % (ref 32–75)
NRBC # BLD: 0 K/UL (ref 0–0.01)
NRBC BLD-RTO: 0 PER 100 WBC
PLATELET # BLD AUTO: 351 K/UL (ref 150–400)
PLATELET COMMENT: ABNORMAL
PMV BLD AUTO: 9.5 FL (ref 8.9–12.9)
POTASSIUM SERPL-SCNC: 3 MMOL/L (ref 3.5–5.1)
PROT SERPL-MCNC: 8 G/DL (ref 6.4–8.2)
RBC # BLD AUTO: 4.11 M/UL (ref 3.8–5.2)
RBC MORPH BLD: ABNORMAL
SARS-COV-2 RNA RESP QL NAA+PROBE: NOT DETECTED
SODIUM SERPL-SCNC: 135 MMOL/L (ref 136–145)
WBC # BLD AUTO: 26 K/UL (ref 3.6–11)

## 2024-04-28 PROCEDURE — 6360000002 HC RX W HCPCS: Performed by: EMERGENCY MEDICINE

## 2024-04-28 PROCEDURE — 85025 COMPLETE CBC W/AUTO DIFF WBC: CPT

## 2024-04-28 PROCEDURE — 87880 STREP A ASSAY W/OPTIC: CPT

## 2024-04-28 PROCEDURE — 70491 CT SOFT TISSUE NECK W/DYE: CPT

## 2024-04-28 PROCEDURE — 6360000004 HC RX CONTRAST MEDICATION: Performed by: EMERGENCY MEDICINE

## 2024-04-28 PROCEDURE — 87636 SARSCOV2 & INF A&B AMP PRB: CPT

## 2024-04-28 PROCEDURE — 99285 EMERGENCY DEPT VISIT HI MDM: CPT

## 2024-04-28 PROCEDURE — 96374 THER/PROPH/DIAG INJ IV PUSH: CPT

## 2024-04-28 PROCEDURE — 96375 TX/PRO/DX INJ NEW DRUG ADDON: CPT

## 2024-04-28 PROCEDURE — 2580000003 HC RX 258: Performed by: EMERGENCY MEDICINE

## 2024-04-28 PROCEDURE — 83735 ASSAY OF MAGNESIUM: CPT

## 2024-04-28 PROCEDURE — 80053 COMPREHEN METABOLIC PANEL: CPT

## 2024-04-28 PROCEDURE — 96361 HYDRATE IV INFUSION ADD-ON: CPT

## 2024-04-28 PROCEDURE — 36415 COLL VENOUS BLD VENIPUNCTURE: CPT

## 2024-04-28 RX ORDER — KETOROLAC TROMETHAMINE 15 MG/ML
15 INJECTION, SOLUTION INTRAMUSCULAR; INTRAVENOUS ONCE
Status: COMPLETED | OUTPATIENT
Start: 2024-04-28 | End: 2024-04-28

## 2024-04-28 RX ORDER — 0.9 % SODIUM CHLORIDE 0.9 %
1000 INTRAVENOUS SOLUTION INTRAVENOUS ONCE
Status: COMPLETED | OUTPATIENT
Start: 2024-04-28 | End: 2024-04-28

## 2024-04-28 RX ORDER — DEXAMETHASONE SODIUM PHOSPHATE 10 MG/ML
10 INJECTION, SOLUTION INTRAMUSCULAR; INTRAVENOUS ONCE
Status: COMPLETED | OUTPATIENT
Start: 2024-04-28 | End: 2024-04-28

## 2024-04-28 RX ORDER — AMOXICILLIN 500 MG/1
500 CAPSULE ORAL 2 TIMES DAILY
Qty: 20 CAPSULE | Refills: 0 | Status: SHIPPED | OUTPATIENT
Start: 2024-04-28 | End: 2024-05-08

## 2024-04-28 RX ADMIN — DEXAMETHASONE SODIUM PHOSPHATE 10 MG: 10 INJECTION, SOLUTION INTRAMUSCULAR; INTRAVENOUS at 12:03

## 2024-04-28 RX ADMIN — KETOROLAC TROMETHAMINE 15 MG: 15 INJECTION, SOLUTION INTRAMUSCULAR; INTRAVENOUS at 14:03

## 2024-04-28 RX ADMIN — SODIUM CHLORIDE 1000 ML: 9 INJECTION, SOLUTION INTRAVENOUS at 12:01

## 2024-04-28 RX ADMIN — IOPAMIDOL 100 ML: 612 INJECTION, SOLUTION INTRAVENOUS at 13:53

## 2024-04-28 ASSESSMENT — PAIN DESCRIPTION - LOCATION: LOCATION: HEAD

## 2024-04-28 ASSESSMENT — LIFESTYLE VARIABLES
HOW MANY STANDARD DRINKS CONTAINING ALCOHOL DO YOU HAVE ON A TYPICAL DAY: PATIENT DOES NOT DRINK
HOW OFTEN DO YOU HAVE A DRINK CONTAINING ALCOHOL: NEVER

## 2024-04-28 ASSESSMENT — PAIN SCALES - GENERAL
PAINLEVEL_OUTOF10: 4
PAINLEVEL_OUTOF10: 9

## 2024-04-28 ASSESSMENT — PAIN - FUNCTIONAL ASSESSMENT: PAIN_FUNCTIONAL_ASSESSMENT: 0-10

## 2024-04-28 NOTE — ED PROVIDER NOTES
[facility] EMERGENCY DEP  EMERGENCY DEPARTMENT ENCOUNTER       Pt Name: Effie Melchor  MRN: [id number]  Birthdate 1955  Date of evaluation: 4/28/2024  Provider: Carmelina Cooley MD   PCP: Kell Zepeda APRN - NP  Note Started: 2:28 PM EDT 4/28/24     CHIEF COMPLAINT       Chief Complaint   Patient presents with    Pharyngitis        HISTORY OF PRESENT ILLNESS: 1 or more elements      History From: Patient, History limited by: none     Effie Melchor is a 68 y.o. female who presents with a chief complaint of sore throat, congestion, and bodyaches.       Please See MDM for Additional Details of the HPI/PMH  Nursing Notes were all reviewed and agreed with or any disagreements were addressed in the HPI.     REVIEW OF SYSTEMS        Positives and Pertinent negatives as per HPI.    PAST HISTORY     Past Medical History:  Past Medical History:   Diagnosis Date    Arthritis     Breast cyst     Depressive disorder, not elsewhere classified 6/17/2013    Fibromyalgia 6/17/2013    Hypertension     Menopause     Mixed hyperlipidemia 6/17/2013    Type II or unspecified type diabetes mellitus without mention of complication, not stated as uncontrolled 6/17/2013    Unspecified vitamin D deficiency 6/17/2013       Past Surgical History:  Past Surgical History:   Procedure Laterality Date    BREAST BIOPSY Left     cyst removal    CHOLECYSTECTOMY      COLONOSCOPY  7/28/14    HYSTERECTOMY (CERVIX STATUS UNKNOWN)      MOHS SURGERY      OTHER SURGICAL HISTORY      cyst removed from spine    OVARY REMOVAL      MO UNLISTED PROCEDURE ABDOMEN PERITONEUM & OMENTUM  6/2013    Colon resection       Family History:  Family History   Problem Relation Age of Onset    Hypertension Sister     Hypertension Brother     Hypertension Sister     Heart Disease Sister     Stroke Sister     Hypertension Sister     Heart Disease Father     Diabetes Father     Kidney Disease Mother     Diabetes Mother     Hypertension Mother        Social  Content: Thought content normal.          DIAGNOSTIC RESULTS   LABS:     Recent Results (from the past 24 hour(s))   COVID-19 & Influenza Combo    Collection Time: 04/28/24 11:33 AM    Specimen: Nasal   Result Value Ref Range    SARS-CoV-2, PCR Not detected NOTD      Rapid Influenza A By PCR Not detected NOTD      Rapid Influenza B By PCR Not detected NOTD     Rapid Strep Screen    Collection Time: 04/28/24 11:33 AM    Specimen: Swab; Throat   Result Value Ref Range    Strep A Ag Positive (A) NEG     CBC with Auto Differential    Collection Time: 04/28/24 11:58 AM   Result Value Ref Range    WBC 26.0 (H) 3.6 - 11.0 K/uL    RBC 4.11 3.80 - 5.20 M/uL    Hemoglobin 11.4 (L) 11.5 - 16.0 g/dL    Hematocrit 34.5 (L) 35.0 - 47.0 %    MCV 83.9 80.0 - 99.0 FL    MCH 27.7 26.0 - 34.0 PG    MCHC 33.0 30.0 - 36.5 g/dL    RDW 14.9 (H) 11.5 - 14.5 %    Platelets 351 150 - 400 K/uL    MPV 9.5 8.9 - 12.9 FL    Nucleated RBCs 0.0 0  WBC    nRBC 0.00 0.00 - 0.01 K/uL    Neutrophils % 74 32 - 75 %    Band Neutrophils 3 %    Lymphocytes % 16 12 - 49 %    Monocytes % 7 5 - 13 %    Eosinophils % 0 0 - 7 %    Basophils % 0 0 - 1 %    Immature Granulocytes % 0 0.0 - 0.5 %    Neutrophils Absolute 20.0 (H) 1.8 - 8.0 K/UL    Lymphocytes Absolute 4.2 (H) 0.8 - 3.5 K/UL    Monocytes Absolute 1.8 (H) 0.0 - 1.0 K/UL    Eosinophils Absolute 0.0 0.0 - 0.4 K/UL    Basophils Absolute 0.0 0.0 - 0.1 K/UL    Immature Granulocytes Absolute 0.0 0.00 - 0.04 K/UL    Differential Type MANUAL      Platelet Comment ADEQUATE PLATELETS      RBC Comment ANISOCYTOSIS  1+       Comprehensive Metabolic Panel w/ Reflex to MG    Collection Time: 04/28/24 11:58 AM   Result Value Ref Range    Sodium 135 (L) 136 - 145 mmol/L    Potassium 3.0 (L) 3.5 - 5.1 mmol/L    Chloride 95 (L) 97 - 108 mmol/L    CO2 31 21 - 32 mmol/L    Anion Gap 9 5 - 15 mmol/L    Glucose 147 (H) 65 - 100 mg/dL    BUN 26 (H) 6 - 20 MG/DL    Creatinine 1.17 (H) 0.55 - 1.02 MG/DL    Bun/Cre

## 2024-05-04 ENCOUNTER — APPOINTMENT (OUTPATIENT)
Facility: HOSPITAL | Age: 69
End: 2024-05-04
Payer: MEDICARE

## 2024-05-04 ENCOUNTER — HOSPITAL ENCOUNTER (EMERGENCY)
Facility: HOSPITAL | Age: 69
Discharge: HOME | End: 2024-05-05
Attending: EMERGENCY MEDICINE
Payer: MEDICARE

## 2024-05-04 DIAGNOSIS — R10.9 RIGHT FLANK PAIN: Primary | ICD-10-CM

## 2024-05-04 DIAGNOSIS — E83.42 HYPOMAGNESEMIA: ICD-10-CM

## 2024-05-04 DIAGNOSIS — K85.90 ACUTE PANCREATITIS, UNSPECIFIED COMPLICATION STATUS, UNSPECIFIED PANCREATITIS TYPE: ICD-10-CM

## 2024-05-04 LAB
ALBUMIN SERPL-MCNC: 2.6 G/DL (ref 3.5–5)
ALBUMIN/GLOB SERPL: 0.6 (ref 1.1–2.2)
ALP SERPL-CCNC: 80 U/L (ref 45–117)
ALT SERPL-CCNC: 12 U/L (ref 12–78)
ANION GAP SERPL CALC-SCNC: 9 MMOL/L (ref 5–15)
APPEARANCE UR: CLEAR
AST SERPL-CCNC: 10 U/L (ref 15–37)
BACTERIA URNS QL MICRO: ABNORMAL /HPF
BASOPHILS # BLD: 0.4 K/UL (ref 0–0.1)
BASOPHILS NFR BLD: 2 % (ref 0–1)
BILIRUB SERPL-MCNC: 0.3 MG/DL (ref 0.2–1)
BILIRUB UR QL: NEGATIVE
BUN SERPL-MCNC: 14 MG/DL (ref 6–20)
BUN/CREAT SERPL: 18 (ref 12–20)
CALCIUM SERPL-MCNC: 8.6 MG/DL (ref 8.5–10.1)
CHLORIDE SERPL-SCNC: 96 MMOL/L (ref 97–108)
CO2 SERPL-SCNC: 29 MMOL/L (ref 21–32)
COLOR UR: ABNORMAL
CREAT SERPL-MCNC: 0.78 MG/DL (ref 0.55–1.02)
DIFFERENTIAL METHOD BLD: ABNORMAL
EOSINOPHIL # BLD: 0.2 K/UL (ref 0–0.4)
EOSINOPHIL NFR BLD: 1 % (ref 0–7)
EPITH CASTS URNS QL MICRO: ABNORMAL /LPF
ERYTHROCYTE [DISTWIDTH] IN BLOOD BY AUTOMATED COUNT: 15 % (ref 11.5–14.5)
GLOBULIN SER CALC-MCNC: 4.6 G/DL (ref 2–4)
GLUCOSE SERPL-MCNC: 180 MG/DL (ref 65–100)
GLUCOSE UR STRIP.AUTO-MCNC: 100 MG/DL
HCT VFR BLD AUTO: 28.9 % (ref 35–47)
HGB BLD-MCNC: 9.6 G/DL (ref 11.5–16)
HGB UR QL STRIP: NEGATIVE
IMM GRANULOCYTES # BLD AUTO: 0 K/UL (ref 0–0.04)
IMM GRANULOCYTES NFR BLD AUTO: 0 % (ref 0–0.5)
KETONES UR QL STRIP.AUTO: NEGATIVE MG/DL
LACTATE SERPL-SCNC: 0.7 MMOL/L (ref 0.4–2)
LEUKOCYTE ESTERASE UR QL STRIP.AUTO: NEGATIVE
LIPASE SERPL-CCNC: 169 U/L (ref 13–75)
LYMPHOCYTES # BLD: 7.1 K/UL (ref 0.8–3.5)
LYMPHOCYTES NFR BLD: 36 % (ref 12–49)
MAGNESIUM SERPL-MCNC: 1 MG/DL (ref 1.6–2.4)
MCH RBC QN AUTO: 27.3 PG (ref 26–34)
MCHC RBC AUTO-ENTMCNC: 33.2 G/DL (ref 30–36.5)
MCV RBC AUTO: 82.1 FL (ref 80–99)
MONOCYTES # BLD: 1.8 K/UL (ref 0–1)
MONOCYTES NFR BLD: 9 % (ref 5–13)
NEUTS BAND NFR BLD MANUAL: 4 %
NEUTS SEG # BLD: 10.1 K/UL (ref 1.8–8)
NEUTS SEG NFR BLD: 48 % (ref 32–75)
NITRITE UR QL STRIP.AUTO: NEGATIVE
NRBC # BLD: 0 K/UL (ref 0–0.01)
NRBC BLD-RTO: 0 PER 100 WBC
PH UR STRIP: 7 (ref 5–8)
PLATELET # BLD AUTO: 498 K/UL (ref 150–400)
PLATELET COMMENT: ABNORMAL
PMV BLD AUTO: 9.6 FL (ref 8.9–12.9)
POTASSIUM SERPL-SCNC: 3.9 MMOL/L (ref 3.5–5.1)
PROT SERPL-MCNC: 7.2 G/DL (ref 6.4–8.2)
PROT UR STRIP-MCNC: NEGATIVE MG/DL
RBC # BLD AUTO: 3.52 M/UL (ref 3.8–5.2)
RBC #/AREA URNS HPF: ABNORMAL /HPF (ref 0–5)
RBC MORPH BLD: ABNORMAL
RBC MORPH BLD: ABNORMAL
SODIUM SERPL-SCNC: 134 MMOL/L (ref 136–145)
SP GR UR REFRACTOMETRY: 1.01 (ref 1–1.03)
URINE CULTURE IF INDICATED: ABNORMAL
UROBILINOGEN UR QL STRIP.AUTO: 1 EU/DL (ref 0.2–1)
WBC # BLD AUTO: 19.6 K/UL (ref 3.6–11)
WBC URNS QL MICRO: ABNORMAL /HPF (ref 0–4)

## 2024-05-04 PROCEDURE — 99284 EMERGENCY DEPT VISIT MOD MDM: CPT

## 2024-05-04 PROCEDURE — 2580000003 HC RX 258: Performed by: EMERGENCY MEDICINE

## 2024-05-04 PROCEDURE — 6360000002 HC RX W HCPCS: Performed by: EMERGENCY MEDICINE

## 2024-05-04 PROCEDURE — 80053 COMPREHEN METABOLIC PANEL: CPT

## 2024-05-04 PROCEDURE — 83605 ASSAY OF LACTIC ACID: CPT

## 2024-05-04 PROCEDURE — 83735 ASSAY OF MAGNESIUM: CPT

## 2024-05-04 PROCEDURE — 81001 URINALYSIS AUTO W/SCOPE: CPT

## 2024-05-04 PROCEDURE — 83690 ASSAY OF LIPASE: CPT

## 2024-05-04 PROCEDURE — 86308 HETEROPHILE ANTIBODY SCREEN: CPT

## 2024-05-04 PROCEDURE — 74176 CT ABD & PELVIS W/O CONTRAST: CPT

## 2024-05-04 PROCEDURE — 36415 COLL VENOUS BLD VENIPUNCTURE: CPT

## 2024-05-04 PROCEDURE — 96361 HYDRATE IV INFUSION ADD-ON: CPT

## 2024-05-04 PROCEDURE — 96375 TX/PRO/DX INJ NEW DRUG ADDON: CPT

## 2024-05-04 PROCEDURE — 85025 COMPLETE CBC W/AUTO DIFF WBC: CPT

## 2024-05-04 RX ORDER — 0.9 % SODIUM CHLORIDE 0.9 %
1000 INTRAVENOUS SOLUTION INTRAVENOUS ONCE
Status: COMPLETED | OUTPATIENT
Start: 2024-05-04 | End: 2024-05-04

## 2024-05-04 RX ORDER — KETOROLAC TROMETHAMINE 15 MG/ML
15 INJECTION, SOLUTION INTRAMUSCULAR; INTRAVENOUS ONCE
Status: COMPLETED | OUTPATIENT
Start: 2024-05-04 | End: 2024-05-04

## 2024-05-04 RX ORDER — MAGNESIUM SULFATE IN WATER 40 MG/ML
2000 INJECTION, SOLUTION INTRAVENOUS ONCE
Status: COMPLETED | OUTPATIENT
Start: 2024-05-04 | End: 2024-05-05

## 2024-05-04 RX ADMIN — SODIUM CHLORIDE 1000 ML: 9 INJECTION, SOLUTION INTRAVENOUS at 22:50

## 2024-05-04 RX ADMIN — KETOROLAC TROMETHAMINE 15 MG: 15 INJECTION, SOLUTION INTRAMUSCULAR; INTRAVENOUS at 22:51

## 2024-05-04 ASSESSMENT — LIFESTYLE VARIABLES
HOW OFTEN DO YOU HAVE A DRINK CONTAINING ALCOHOL: NEVER
HOW MANY STANDARD DRINKS CONTAINING ALCOHOL DO YOU HAVE ON A TYPICAL DAY: PATIENT DOES NOT DRINK

## 2024-05-04 ASSESSMENT — PAIN SCALES - GENERAL
PAINLEVEL_OUTOF10: 0
PAINLEVEL_OUTOF10: 10

## 2024-05-04 ASSESSMENT — PAIN - FUNCTIONAL ASSESSMENT: PAIN_FUNCTIONAL_ASSESSMENT: 0-10

## 2024-05-05 VITALS
TEMPERATURE: 98.1 F | BODY MASS INDEX: 26.43 KG/M2 | SYSTOLIC BLOOD PRESSURE: 144 MMHG | WEIGHT: 140 LBS | DIASTOLIC BLOOD PRESSURE: 81 MMHG | OXYGEN SATURATION: 98 % | HEIGHT: 61 IN | HEART RATE: 96 BPM | RESPIRATION RATE: 20 BRPM

## 2024-05-05 LAB — HETEROPH AB BLD QL IA: NEGATIVE

## 2024-05-05 PROCEDURE — 6360000002 HC RX W HCPCS: Performed by: EMERGENCY MEDICINE

## 2024-05-05 PROCEDURE — 96365 THER/PROPH/DIAG IV INF INIT: CPT

## 2024-05-05 PROCEDURE — 96366 THER/PROPH/DIAG IV INF ADDON: CPT

## 2024-05-05 RX ORDER — KETOROLAC TROMETHAMINE 10 MG/1
10 TABLET, FILM COATED ORAL EVERY 6 HOURS PRN
Qty: 20 TABLET | Refills: 0 | Status: SHIPPED | OUTPATIENT
Start: 2024-05-05

## 2024-05-05 RX ORDER — MAGNESIUM OXIDE 400 MG/1
400 TABLET ORAL 2 TIMES DAILY
Qty: 20 TABLET | Refills: 1 | Status: SHIPPED | OUTPATIENT
Start: 2024-05-05

## 2024-05-05 RX ADMIN — MAGNESIUM SULFATE HEPTAHYDRATE 2000 MG: 40 INJECTION, SOLUTION INTRAVENOUS at 00:27

## 2024-05-05 ASSESSMENT — PAIN SCALES - GENERAL: PAINLEVEL_OUTOF10: 0

## 2024-05-05 NOTE — ED PROVIDER NOTES
[facility] EMERGENCY DEP  EMERGENCY DEPARTMENT ENCOUNTER       Pt Name: Effie Melchor  MRN: [id number]  Birthdate 1955  Date of evaluation: 5/4/2024  Provider: Torrey Coronel MD   PCP: Kell Zepeda APRN - NP  Note Started: 2:08 AM 5/4/24      FINAL IMPRESSION     1. Right flank pain    2. Hypomagnesemia    3. Acute pancreatitis, unspecified complication status, unspecified pancreatitis type          DISPOSITION/PLAN     Disposition:  DISPOSITION Decision To Discharge 05/05/2024 02:07:58 AM      Discharge Note: The patient is stable for discharge home. The signs, symptoms, diagnosis, and discharge instructions have been discussed, understanding conveyed, and agreed upon. The patient is to follow up as recommended or return to ER should their symptoms worsen.      PATIENT REFERRED TO:  Kell Zepeda APRN - NP  [address] [place] [id number] [phone]    Schedule an appointment as soon as possible for a visit in 2 days  For Follow Up            DISCHARGE MEDICATIONS:     Medication List        START taking these medications      ketorolac 10 MG tablet  Commonly known as: TORADOL  Take 1 tablet by mouth every 6 hours as needed for Pain     magnesium oxide 400 MG tablet  Commonly known as: MAG-OX  Take 1 tablet by mouth 2 times daily            ASK your doctor about these medications      acetaminophen 500 MG tablet  Commonly known as: TYLENOL     * albuterol (2.5 MG/3ML) 0.083% nebulizer solution  Commonly known as: PROVENTIL     * albuterol sulfate  (90 Base) MCG/ACT inhaler  Commonly known as: PROVENTIL;VENTOLIN;PROAIR     ALPRAZolam 1 MG tablet  Commonly known as: XANAX     amLODIPine 10 MG tablet  Commonly known as: NORVASC     amoxicillin 500 MG capsule  Commonly known as: AMOXIL  Take 1 capsule by mouth 2 times daily for 10 days     atorvastatin 10 MG tablet  Commonly known as: LIPITOR     baclofen 10 MG tablet  Commonly known as: LIORESAL     cetirizine 10 MG

## 2024-05-05 NOTE — DISCHARGE INSTR - COC
Continuity of Care Form    Patient Name: Effie Melchor   :  1955  MRN:  [id number]    Admit date:  2024  Discharge date:  ***    Code Status Order: No Order   Advance Directives:     Admitting Physician:  No admitting provider for patient encounter.  PCP: Kell Zepeda APRN - NP    Discharging Nurse: ***  Discharging Hospital Unit/Room#: ED10/10  Discharging Unit Phone Number: ***    Emergency Contact:   Extended Emergency Contact Information  Primary Emergency Contact: Alma Ashley   [facility]  Home Phone: [phone]  Mobile Phone: [phone]  Relation: Child  Secondary Emergency Contact: TED MELCHOR  Home Phone: [phone]  Mobile Phone: [phone]  Relation: Child    Past Surgical History:  Past Surgical History:   Procedure Laterality Date    BREAST BIOPSY Left     cyst removal    CHOLECYSTECTOMY      COLONOSCOPY  14    HYSTERECTOMY (CERVIX STATUS UNKNOWN)      MOHS SURGERY      OTHER SURGICAL HISTORY      cyst removed from spine    OVARY REMOVAL      VT UNLISTED PROCEDURE ABDOMEN PERITONEUM & OMENTUM  2013    Colon resection       Immunization History:   Immunization History   Administered Date(s) Administered    Influenza Trivalent 2013, 10/31/2014    Influenza, High Dose (Fluzone 65 yrs and older) 2015       Active Problems:  Patient Active Problem List   Diagnosis Code    Fibromyalgia M79.7    Mixed hyperlipidemia E78.2    Dysthymic disorder F34.1    Type II diabetes mellitus (HCC) E11.9    Stroke (cerebrum) (HCC) I63.9    Essential hypertension, benign I10    Sleep apnea G47.30    Insomnia G47.00       Isolation/Infection:   Isolation            No Isolation          Patient Infection Status       None to display                     Nurse Assessment:  Last Vital Signs: BP (!) 144/81   Pulse 96   Temp 98.1 °F (36.7 °C) (Oral)   Resp 20   Ht 1.549 m (5' 1\")   Wt 63.5 kg (140 lb)   LMP  (LMP Unknown)   SpO2 98%   BMI 26.45 kg/m²

## 2024-05-28 NOTE — H&P
Hospitalist Admission Note     NAME:            Dior Bucio   :               1955   MRN:               [id number]      Date/Time:      2020 3:41 PM     Patient PCP: Tamir Sawyer MD  ______________________________________________________________________  Given the patient's current clinical presentation, I have a high level of concern for decompensation if discharged from the emergency department.  Complex decision making was performed, which includes reviewing the patient's available past medical records, laboratory results, and x-ray films.        My assessment of this patient's clinical condition and my plan of care is as follows.     Assessment / Plan:  Acute CVA  Neck pain  CT head -No evidence of acute intracranial abnormality. Moderate chronic microvascular ischemic disease.   MRI head- Multiple small acute infarcts in the right frontoparietal white matter. Moderate chronic microvascular ischemic disease. Numerous chronic microhemorrhages in the bilateral thalami, and few additional scattered supratentorial and infratentorial chronic microhemorrhages as above.  CTA Head-   No evidence of flow-limiting stenosis. Multifocal atherosclerotic disease as above, including severe stenosis of the left P2 segment, moderate stenosis of the distal right M1 segment and moderate stenosis of the bilateral M2 segments. A 2 mm right posterior communicating artery infundibulum versus aneurysm.   CTA Neck-  No evidence of flow-limiting stenosis. Mild stenosis (less than 50% by NASCET criteria) of the proximal left internal carotid artery. Check Echo, labs: TSH, A1C, Lipid  Add flexeril prn  Start ASA and statin. Pt was not on ASA PTA. I reviewed PTA meds with her daughter  Neurology consulted  PT/OT     HTN  Cont' losartan and amlodipine, titrate prn. Add iV hydralazine.    May need to add another agent if BP not well control     Anxiety/depression  Cont' home xanax prn  Denies Pt came from [facility] and is able to return when medically clear. No precert needed. Will need [place] signed. Transport will need to be set up.     SECURE DISCHARGE TO RETURN TO [facility].        05/28/24 6511   Discharge Planning   Type of Residence Skilled nursing facility   Who is requesting discharge planning? Provider   Home or Post Acute Services Post acute facilities (Rehab/SNF/etc)   Type of Post Acute Facility Services Skilled nursing   Patient expects to be discharged to: [facility]   Does the patient need discharge transport arranged? Yes   RoundTrip coordination needed? Yes   Has discharge transport been arranged? No   Patient Choice   Provider Choice list and CMS website (https://medicare.gov/care-compare#search) for post-acute Quality and Resource Measure Data were provided and reviewed with: Patient   Patient / Family choosing to utilize agency / facility established prior to hospitalization Yes        SI/HI     T2DM  On metformin and Januvia at home, will hold  SSI     Code Status: Full  Surrogate Decision Maker: pt's daughter  DVT Prophylaxis: lovenox  GI Prophylaxis: not indicated     Baseline: from home, independent                 Subjective:   CHIEF COMPLAINT: L hand weakness     HISTORY OF PRESENT ILLNESS:     Nile Rivera is a 71 y.o.  female who presents to the ER complaining of numbness and weakness to her L hand. Symptoms started \"on a Sunday\". Pt states she came in today because she got to the point where she can't sleep due to pain in R side of her neck, L hand numbness and tingling. She also had some difficulty with slurred speech about 2 weeks ago while she was in the heat, improved when she cooled down. She denies any difficulty with ambulation, weakness in her legs. She currently has a headache and nausea, but no vomiting. Denies any fever, chills, cp, sob, palpitations.     Vitals/labs/images reviewed  In the ER, pt had an MRI head with multiple small acute infacrcts in the R frontoparietal white matter.     We were asked to admit for work up and evaluation of the above problems.           Past Medical History:   Diagnosis Date    Arthritis      Breast cyst      Depressive disorder, not elsewhere classified 6/17/2013    Fibromyalgia 6/17/2013    Hypertension      Menopause      Mixed hyperlipidemia 6/17/2013    Type II or unspecified type diabetes mellitus without mention of complication, not stated as uncontrolled 6/17/2013    Unspecified vitamin D deficiency 6/17/2013               Past Surgical History:   Procedure Laterality Date    ABDOMEN SURGERY PROC UNLISTED   6/2013     Colon resection    HX BREAST BIOPSY Left       cyst removal    HX CHOLECYSTECTOMY        HX COLONOSCOPY   7/28/14    HX HYSTERECTOMY        HX MOHS PROCEDURES        HX OOPHORECTOMY        HX OTHER SURGICAL         cyst removed from spine        Social History           Tobacco Use    Smoking status: Former Smoker    Tobacco comment: quit 2010   Substance Use Topics    Alcohol use: No               Family History   Problem Relation Age of Onset    Diabetes Mother      Hypertension Mother      Kidney Disease Mother      Diabetes Father      Heart Disease Father      Hypertension Sister      Stroke Sister      Heart Disease Sister      Hypertension Sister      Hypertension Brother      Hypertension Sister            Allergies   Allergen Reactions    Bees [Hymenoptera Allergenic Extract] Nausea Only    Menthol Chest Rub [Camph-Eucalypt-Men-Turp-Pet] Rash    Percocet [Oxycodone-Acetaminophen] Unknown (comments)                 Prior to Admission medications    Medication Sig Start Date End Date Taking? Authorizing Provider   cyclobenzaprine (FLEXERIL) 10 mg tablet Take 1 Tab by mouth three (3) times daily as needed for Muscle Spasm(s). 7/22/20   Yes TermeerAlejandra MD   escitalopram oxalate (LEXAPRO) 10 mg tablet Take 1 Tab by mouth daily. Indications: ANXIETY WITH DEPRESSION 1/19/16     Muna Jonathan, NP   ALPRAZolam Nancy Miles) 1 mg tablet Take 1 Tab by mouth two (2) times daily as needed for Anxiety. Max Daily Amount: 2 mg. Indications: ANXIETY WITH DEPRESSION 1/19/16     Muna Jonathan, NP   amLODIPine (NORVASC) 10 mg tablet Take 1 Tab by mouth daily. 11/19/15     Muna Castillo NP   atorvastatin (LIPITOR) 10 mg tablet TAKE ONE TABLET BY MOUTH ONCE DAILY 11/17/15     Muna Castillo NP   Nebulizer Accessories kit Use as directed. 10/1/15     Muna Castillo NP   glimepiride (AMARYL) 4 mg tablet TAKE ONE TABLET BY MOUTH IN THE MORNING 8/18/15     Muna Castillo NP   ondansetron (ZOFRAN ODT) 4 mg disintegrating tablet Take 1 Tab by mouth every eight (8) hours as needed for Nausea. 8/13/15     Kylah Balbuena MD   metoprolol succinate (TOPROL-XL) 25 mg XL tablet Take 1 Tab by mouth daily.  6/30/15     Keri Terry MAGI Boles   glucose blood VI test strips (ASCENSIA AUTODISC VI, ONE TOUCH ULTRA TEST VI) strip Test blood sugar three time daily. 6/29/15     Gem Milton NP   acetaminophen (TYLENOL EXTRA STRENGTH) 500 mg tablet Take  by mouth every six (6) hours as needed for Pain.       Provider, Historical   fluticasone-salmeterol (ADVAIR) 100-50 mcg/dose diskus inhaler Take 1 Puff by inhalation two (2) times a day. 3/27/15     Gem Milton NP   fluticasone [facility]) 50 mcg/actuation nasal spray 2 Sprays by Both Nostrils route daily. 2/23/15     Gem Milton NP   lisinopril-hydrochlorothiazide (PRINZIDE, ZESTORETIC) 20-12.5 mg per tablet Take 2 Tabs by mouth daily. 2/9/15     Gem Milton NP   aspirin 81 mg chewable tablet Take 81 mg by mouth daily.       Provider, Historical   albuterol (PROAIR HFA) 90 mcg/actuation inhaler Take 1 puff by inhalation every four (4) hours as needed for Wheezing or Shortness of Breath. 12/5/14     Gem Milton NP   loratadine (CLARITIN) 10 mg tablet Take 10 mg by mouth daily.       Provider, Historical   celecoxib (CELEBREX) 200 mg capsule Take 1 capsule by mouth two (2) times daily as needed for Pain. 12/5/14     Gem Milton NP   cholecalciferol, vitamin D3, (VITAMIN D3) 2,000 unit tab Take  by mouth daily.       Provider, Historical   0.9 % SODIUM CHLORIDE (NASAL MIST NA) by Nasal route.       Provider, Historical   albuterol (PROVENTIL VENTOLIN) 2.5 mg /3 mL (0.083 %) nebulizer solution 3 mL by Nebulization route every four (4) hours as needed for Wheezing. 11/20/14     KAUR Donovan   epinephrine (EPIPEN) 0.3 mg/0.3 mL (1:1,000) injection 0.3 mL by IntraMUSCular route once as needed. 10/31/14     Gem Milton NP   zolpidem CR (AMBIEN CR) 12.5 mg tablet Take 1 tablet by mouth nightly as needed.  10/31/14     Gem Milton NP        REVIEW OF SYSTEMS:     I am not able to complete the review of systems because:    The patient is intubated and sedated     The patient has altered mental status due to his acute medical problems     The patient has baseline aphasia from prior stroke(s)     The patient has baseline dementia and is not reliable historian     The patient is in acute medical distress and unable to provide information               Total of 12 systems reviewed as follows:                                        POSITIVE= underlined text  Negative = text not underlined  General:                     fever, chills, sweats, generalized weakness, weight loss/gain,                                       loss of appetite   Eyes:                           blurred vision, eye pain, loss of vision, double vision  ENT:                            rhinorrhea, pharyngitis   Respiratory:               cough, sputum production, SOB, [place], wheezing, pleuritic pain   Cardiology:                chest pain, palpitations, orthopnea, PND, edema, syncope   Gastrointestinal:       abdominal pain , N/V, diarrhea, dysphagia, constipation, bleeding   Genitourinary:           frequency, urgency, dysuria, hematuria, incontinence   Muskuloskeletal :      arthralgia, myalgia, back pain  Hematology:              easy bruising, nose or gum bleeding, lymphadenopathy   Dermatological:         rash, ulceration, pruritis, color change / jaundice  Endocrine:                 hot flashes or polydipsia   Neurological:             headache, dizziness, confusion, focal weakness, paresthesia,                                      Speech difficulties, memory loss, gait difficulty  Psychological:          Feelings of anxiety, depression, agitation     Objective:   VITALS:    Visit Vitals  /86   Pulse (!) 103   Temp 98.2 °F (36.8 °C)   Resp 17   Ht 5' 1\" (1.549 m)   Wt 66.9 kg (147 lb 7.8 oz)   SpO2 99%   BMI 27.87 kg/m²        PHYSICAL EXAM:     General:          Alert, cooperative, no distress, appears stated age.      HEENT: Atraumatic, anicteric sclerae, pink conjunctivae                          No oral ulcers, mucosa moist, throat clear, dentition fair  Neck:               Supple, symmetrical,  thyroid: non tender  Lungs:             Clear to auscultation bilaterally. No Wheezing or Rhonchi. No rales. Chest wall:      No tenderness  No Accessory muscle use. Heart:              Regular  rhythm,  No  murmur   No edema  Abdomen:        Soft, non-tender. Not distended. Bowel sounds normal  Extremities:     No cyanosis. No clubbing,                            Skin turgor normal, Capillary refill normal, Radial dial pulse 2+  Skin:                Not pale. Not Jaundiced  No rashes   Psych:             Good insight. Not depressed. Not anxious or agitated. Neurologic:      EOMs intact. No facial asymmetry. No aphasia or slurred speech. Symmetrical strength, some numbness on L hand.   Alert and oriented X 4.      _______________________________________________________________________  Care Plan discussed with:      Comments   Patient x     Family  x     RN x     Care Manager                      Consultant:        _______________________________________________________________________  Expected  Disposition:   Home with Family     HH/PT/OT/RN x   SNF/LTC     WILLIE     ________________________________________________________________________  TOTAL TIME: 72 Minutes     Critical Care Provided     Minutes non procedure based         Comments     x Reviewed previous records   >50% of visit spent in counseling and coordination of care x Discussion with patient and/or family and questions answered         ________________________________________________________________________  Signed: Monroe Max MD     Procedures: see electronic medical records for all procedures/Xrays and details which were not copied into this note but were reviewed prior to creation of Plan.     LAB DATA REVIEWED:    Recent Results

## 2024-07-08 ENCOUNTER — ANESTHESIA (OUTPATIENT)
Facility: HOSPITAL | Age: 69
End: 2024-07-08
Payer: MEDICARE

## 2024-07-08 ENCOUNTER — ANESTHESIA EVENT (OUTPATIENT)
Facility: HOSPITAL | Age: 69
End: 2024-07-08
Payer: MEDICARE

## 2024-07-08 ENCOUNTER — HOSPITAL ENCOUNTER (OUTPATIENT)
Facility: HOSPITAL | Age: 69
Setting detail: OUTPATIENT SURGERY
Discharge: HOME | End: 2024-07-08
Attending: STUDENT IN AN ORGANIZED HEALTH CARE EDUCATION/TRAINING PROGRAM | Admitting: STUDENT IN AN ORGANIZED HEALTH CARE EDUCATION/TRAINING PROGRAM
Payer: MEDICARE

## 2024-07-08 VITALS
SYSTOLIC BLOOD PRESSURE: 190 MMHG | HEIGHT: 61 IN | RESPIRATION RATE: 14 BRPM | OXYGEN SATURATION: 99 % | HEART RATE: 82 BPM | TEMPERATURE: 97.9 F | WEIGHT: 139 LBS | BODY MASS INDEX: 26.24 KG/M2 | DIASTOLIC BLOOD PRESSURE: 104 MMHG

## 2024-07-08 PROCEDURE — 6360000002 HC RX W HCPCS: Performed by: ANESTHESIOLOGY

## 2024-07-08 PROCEDURE — 7100000011 HC PHASE II RECOVERY - ADDTL 15 MIN: Performed by: STUDENT IN AN ORGANIZED HEALTH CARE EDUCATION/TRAINING PROGRAM

## 2024-07-08 PROCEDURE — 88305 TISSUE EXAM BY PATHOLOGIST: CPT

## 2024-07-08 PROCEDURE — 3600007502: Performed by: STUDENT IN AN ORGANIZED HEALTH CARE EDUCATION/TRAINING PROGRAM

## 2024-07-08 PROCEDURE — 7100000010 HC PHASE II RECOVERY - FIRST 15 MIN: Performed by: STUDENT IN AN ORGANIZED HEALTH CARE EDUCATION/TRAINING PROGRAM

## 2024-07-08 PROCEDURE — 2580000003 HC RX 258: Performed by: STUDENT IN AN ORGANIZED HEALTH CARE EDUCATION/TRAINING PROGRAM

## 2024-07-08 PROCEDURE — 3700000001 HC ADD 15 MINUTES (ANESTHESIA): Performed by: STUDENT IN AN ORGANIZED HEALTH CARE EDUCATION/TRAINING PROGRAM

## 2024-07-08 PROCEDURE — 3700000000 HC ANESTHESIA ATTENDED CARE: Performed by: STUDENT IN AN ORGANIZED HEALTH CARE EDUCATION/TRAINING PROGRAM

## 2024-07-08 PROCEDURE — 3600007512: Performed by: STUDENT IN AN ORGANIZED HEALTH CARE EDUCATION/TRAINING PROGRAM

## 2024-07-08 PROCEDURE — 2500000003 HC RX 250 WO HCPCS: Performed by: ANESTHESIOLOGY

## 2024-07-08 PROCEDURE — 2709999900 HC NON-CHARGEABLE SUPPLY: Performed by: STUDENT IN AN ORGANIZED HEALTH CARE EDUCATION/TRAINING PROGRAM

## 2024-07-08 RX ORDER — SODIUM CHLORIDE 0.9 % (FLUSH) 0.9 %
5-40 SYRINGE (ML) INJECTION PRN
Status: DISCONTINUED | OUTPATIENT
Start: 2024-07-08 | End: 2024-07-08 | Stop reason: HOSPADM

## 2024-07-08 RX ORDER — HYDROCHLOROTHIAZIDE 12.5 MG/1
12.5 CAPSULE, GELATIN COATED ORAL DAILY
COMMUNITY
Start: 2022-03-15

## 2024-07-08 RX ORDER — ESMOLOL HYDROCHLORIDE 10 MG/ML
INJECTION INTRAVENOUS PRN
Status: DISCONTINUED | OUTPATIENT
Start: 2024-07-08 | End: 2024-07-08 | Stop reason: SDUPTHER

## 2024-07-08 RX ORDER — SODIUM CHLORIDE 9 MG/ML
25 INJECTION, SOLUTION INTRAVENOUS PRN
Status: DISCONTINUED | OUTPATIENT
Start: 2024-07-08 | End: 2024-07-08 | Stop reason: HOSPADM

## 2024-07-08 RX ORDER — LIDOCAINE HYDROCHLORIDE 20 MG/ML
INJECTION, SOLUTION EPIDURAL; INFILTRATION; INTRACAUDAL; PERINEURAL PRN
Status: DISCONTINUED | OUTPATIENT
Start: 2024-07-08 | End: 2024-07-08 | Stop reason: SDUPTHER

## 2024-07-08 RX ORDER — SODIUM CHLORIDE 0.9 % (FLUSH) 0.9 %
5-40 SYRINGE (ML) INJECTION EVERY 12 HOURS SCHEDULED
Status: DISCONTINUED | OUTPATIENT
Start: 2024-07-08 | End: 2024-07-08 | Stop reason: HOSPADM

## 2024-07-08 RX ADMIN — ESMOLOL HYDROCHLORIDE 10 MG: 10 INJECTION, SOLUTION INTRAVENOUS at 09:53

## 2024-07-08 RX ADMIN — LIDOCAINE HYDROCHLORIDE 40 MG: 20 INJECTION, SOLUTION EPIDURAL; INFILTRATION; INTRACAUDAL; PERINEURAL at 09:41

## 2024-07-08 RX ADMIN — PROPOFOL 200 MG: 10 INJECTION, EMULSION INTRAVENOUS at 10:04

## 2024-07-08 RX ADMIN — SODIUM CHLORIDE 25 ML: 9 INJECTION, SOLUTION INTRAVENOUS at 08:52

## 2024-07-08 ASSESSMENT — PAIN - FUNCTIONAL ASSESSMENT: PAIN_FUNCTIONAL_ASSESSMENT: 0-10

## 2024-07-08 NOTE — H&P
Gastroenterology History and Physical    Patient: Effie Melchor    Physician: Zoraida Menon MD    Vital Signs: Blood pressure (!) 201/97, pulse 87, temperature 98 °F (36.7 °C), resp. rate 18, height 1.556 m (5' 1.25\"), weight 63 kg (139 lb), SpO2 99 %.    Allergies:   Allergies   Allergen Reactions    Morphine Hives    Contrast [Gadolinium Derivatives] Itching    Hydromorphone Other (See Comments)     Reaction Type: Allergy; Reaction(s): bad dreams, makes her loopy   Other reaction(s): Other (see comments)   Bad dreams; doesn't like medication side effects   Bad dreams; doesn't like medication side effects    Other reaction(s): Other (see comments)   Bad dreams; doesn't like medication side effects    Oxycodone-Acetaminophen      Other reaction(s): Unknown (comments)    Wasp Venom Protein Nausea Only    Camph-Eucalypt-Men-Turp-Pet Rash       Procedure: colonoscopy    Indication: personal hx colon  polyps    History:  Past Medical History:   Diagnosis Date    Arthritis     Breast cyst     Depressive disorder, not elsewhere classified 6/17/2013    Fibromyalgia 6/17/2013    Hypertension     Menopause     Mixed hyperlipidemia 6/17/2013    Sciatica     Type II or unspecified type diabetes mellitus without mention of complication, not stated as uncontrolled 6/17/2013    Unspecified vitamin D deficiency 6/17/2013      Past Surgical History:   Procedure Laterality Date    BACK SURGERY      decompression lower back per pt    BREAST BIOPSY Left     cyst removal    CHOLECYSTECTOMY      COLONOSCOPY  7/28/14    HYSTERECTOMY (CERVIX STATUS UNKNOWN)      MOHS SURGERY      OTHER SURGICAL HISTORY      cyst removed from spine    OVARY REMOVAL      [place] UNLISTED PROCEDURE ABDOMEN PERITONEUM & OMENTUM  6/2013    Colon resection- removing polyp-benign      Social History     Socioeconomic History    Marital status:      Spouse name: None    Number of children: None    Years of education: None    Highest education level:

## 2024-07-08 NOTE — ANESTHESIA PRE PROCEDURE
Department of Anesthesiology  Preprocedure Note       Name:  Effie Melchor   Age:  68 y.o.  :  1955                                          MRN:  [id number]         Date:  2024      Surgeon: Surgeon(s):  Zoraida Menon MD    Procedure: Procedure(s):  COLONOSCOPY    Medications prior to admission:   Prior to Admission medications    Medication Sig Start Date End Date Taking? Authorizing Provider   hydroCHLOROthiazide 12.5 MG capsule Take 1 capsule by mouth daily 3/15/22  Yes Provider, MD Feliciano   magnesium oxide (MAG-OX) 400 MG tablet Take 1 tablet by mouth 2 times daily 24   CAROLINE Coronel MD   ketorolac (TORADOL) 10 MG tablet Take 1 tablet by mouth every 6 hours as needed for Pain 24   CAROLINE Coronel MD   cetirizine (ZYRTEC) 10 MG tablet Take 1 tablet by mouth daily  Patient taking differently: Take 1 tablet by mouth daily as needed 3/21/24   Khang Duffy MD   EPINEPHrine (EPIPEN 2-MAL) 0.3 MG/0.3ML SOAJ injection Inject 0.3 mLs into the muscle as needed (anaphylaxis) Use as directed for allergic reaction 3/21/24   Khang Duffy MD   ondansetron (ZOFRAN-ODT) 4 MG disintegrating tablet Take 1 tablet by mouth 3 times daily as needed for Nausea or Vomiting 23   Grisel Plummer MD   acetaminophen (TYLENOL) 500 MG tablet Take by mouth every 6 hours as needed    Automatic Reconciliation, Ar   ALPRAZolam (XANAX) 1 MG tablet Take 1 tablet by mouth 2 times daily as needed. 16   Automatic Reconciliation, Ar   amLODIPine (NORVASC) 10 MG tablet Take 1 tablet by mouth daily 11/19/15   Automatic Reconciliation, Ar   atorvastatin (LIPITOR) 10 MG tablet TAKE ONE TABLET BY MOUTH ONCE DAILY 11/17/15   Automatic Reconciliation, Ar   baclofen (LIORESAL) 10 MG tablet Take 0.5 tablets by mouth 2 times daily    Automatic Reconciliation, Ar   Cholecalciferol 50 MCG ( UT) TABS Take by mouth daily    Automatic Reconciliation, Ar   losartan (COZAAR) 100 MG tablet Take 1

## 2024-07-08 NOTE — PERIOP NOTE
See anesthesia note and MAR for medications given during procedure.   Endoscope was pre-cleaned at the bedside immediately following procedure by JONELLE Dang

## 2024-07-08 NOTE — DISCHARGE INSTRUCTIONS
Effie Melchor  [id number]  1955    COLONOSCOPY DISCHARGE INSTRUCTIONS  Discomfort:  Redness at IV site- apply warm compress to area; if redness or soreness persist- contact your physician  There may be a slight amount of blood passed from the rectum  Gaseous discomfort- walking, belching will help relieve any discomfort  You may not operate a vehicle for 12 hours  You may not engage in an occupation involving machinery or appliances for rest of today  You may not drink alcoholic beverages for at least 12 hours  Avoid making any critical decisions for at least 24 hour  DIET:   High fiber diet.   - however -  remember your colon is empty and a heavy meal will produce gas.   Avoid these foods:  vegetables, fried / greasy foods, carbonated drinks for today    BLOOD-THINNERS:     MEDICATION:  (See attached)     ACTIVITY:  You may not resume your normal daily activities until tomorrow AM; it is recommended that you spend the remainder of the day resting -  avoid any strenuous activity.    CALL M.D. WITH ANY SIGN OF:   Increasing pain, nausea, vomiting  Abdominal distension (swelling)  New increased bleeding (oral or rectal)  Fever (chills)  Pain in chest area  Bloody discharge from nose or mouth  Shortness of breath    You may not  take any Advil, Aspirin, Ibuprofen, Motrin, Aleve, or Goody’s for 10 days, ONLY  Tylenol as needed for pain.    IMPRESSION:    Impression:    Evidence of prior R hemicolectomy.  Several small polyps removed from the rectosigmoid colon.  Otherwise normal colon mucosa      Follow-up Instructions:  Results of any biopsies (if taken) will typically be available in about 1 week.  We will attempt to notify you of any biopsy results.  Please call Dr. Menon for results in 7-10 days if we have not notified you sooner.  Please call Dr. Menon with other questions about the results of your procedure  Telephone #[phone]  Repeat colonoscopy in 3-5 years pending pathology

## 2024-07-08 NOTE — OP NOTE
[facility]                  Colonoscopy Operative Report    7/8/2024      Effie Melchor  [id number]  1955    Procedure Type:   Colonoscopy with polypectomy (cold snare)     Indications:    Personal history of colon polyps (screening only)     Pre-operative Diagnosis: see indication above    Post-operative Diagnosis:  See findings below    :  Zoraida Menon MD    Referring Provider: Kell Zepeda APRN - NP      Sedation:  MAC anesthesia Propofol    Pre-Procedural Exam:      Airway: clear,  No airway problems anticipated  Heart: RRR, without gallops or rubs  Lungs: clear bilaterally without wheezes, crackles, or rhonchi  Abdomen: soft, nontender, nondistended, bowel sounds present  Mental Status: awake, alert and oriented to person, place and time     Procedure Details:  After informed consent was obtained with all risks and benefits of procedure explained and preoperative exam completed, the patient was taken to the endoscopy suite and placed in the left lateral decubitus position.  Upon sequential sedation as per above, a digital rectal exam was performed .  The Olympus videocolonoscope  was inserted in the rectum and carefully advanced to the surgical anastomosis .  The ileocecal anstomosis was identified.  Terminal ileum was seen and normal in appearance.  The quality of preparation was adequate BBPS 3+3+2=8.  The colonoscope was slowly withdrawn with careful evaluation between folds. Retroflexion in the rectum was completed demonstrating internal hemorrhoids.     Findings:   Rectum: multiple flat benign appearing polyps seen at the rectosigmoid junction; 4 of these were removed for representative pathology  Sigmoid: normal  Descending Colon: normal  Transverse Colon: normal  Anastomosis: evidence of R hemicolectomy with normal appearance of the mucosa  Terminal Ileum: normal      Specimen Removed:   Jar 1) rectosigmoid polyps    Complications:

## 2024-07-08 NOTE — PROGRESS NOTES
ARRIVAL INFORMATION:  Verified patient name and date of birth, scheduled procedure, and informed consent.     : Alma (daughter) contact number: [phone]  Physician and staff can share information with the .     Belongings with patient include:  Clothing,Glasses, Jewelry, cane (1 watch on patient)    GI FOCUSED ASSESSMENT:  Neuro: Awake, alert, oriented x4  Respiratory: even and unlabored   GI: soft and non-distended  EKG Rhythm: normal sinus rhythm    Education:Reviewed general discharge instructions and  information.      /91, repeat 212/111. Patient reports last time she took her BP medications were on Friday. She took HCTZ this morning. Notified Dr. Lau. No new orders received.

## 2024-07-26 RX ORDER — LIDOCAINE 50 MG/G
1 PATCH TOPICAL DAILY
COMMUNITY

## 2024-07-26 RX ORDER — PIOGLITAZONEHYDROCHLORIDE 15 MG/1
15 TABLET ORAL DAILY
COMMUNITY

## 2024-07-26 RX ORDER — ATORVASTATIN CALCIUM 40 MG/1
40 TABLET, FILM COATED ORAL DAILY
COMMUNITY

## 2024-07-26 RX ORDER — GABAPENTIN 300 MG/1
300 CAPSULE ORAL
COMMUNITY

## 2024-07-26 RX ORDER — PHENOL 1.4 %
1 AEROSOL, SPRAY (ML) MUCOUS MEMBRANE
COMMUNITY

## 2024-07-26 RX ORDER — CLOPIDOGREL BISULFATE 75 MG/1
75 TABLET ORAL DAILY
COMMUNITY

## 2024-07-26 RX ORDER — PANTOPRAZOLE SODIUM 40 MG/1
40 TABLET, DELAYED RELEASE ORAL DAILY
COMMUNITY

## 2024-07-26 RX ORDER — ASPIRIN 81 MG/1
81 TABLET ORAL DAILY
COMMUNITY

## 2024-07-26 NOTE — PERIOP NOTE
[facility]  Ambulatory Surgery Unit  Pre-operative Instructions    Surgery/Procedure Date  8/6/2024            Tentative Arrival Time TBD      1. On the day of your surgery/procedure, please report to the Ambulatory Surgery Unit Registration Desk and sign in at your designated time. The Ambulatory Surgery Unit is located in [facility] on the [facility] across from the [facility]. Please have all of your health insurance cards, co-payment, and a photo ID.    **TWO adults may accompany you the day of the procedure.  We have limited seating available.      2. You cannot be dropped off for surgery.  Please make arrangements for a responsible adult friend or family member to remain on the hospital campus during your procedure, and drive you home, as you should not drive for 24 hours following anesthesia. Make arrangements for a responsible adult to stay with you for at least the first 24 hours after your surgery.    3. Do not have anything to eat or drink (including water, gum, mints, coffee, juice) after 11:59 PM  8/5/2024. This may not apply to medications prescribed by your physician.  (Please note below the special instructions with medications to take the morning of surgery, if applicable.)    4. We recommend you do not drink any alcoholic beverages for 24 hours before and after your surgery.    5. Contact your surgeon’s office for instructions on the following medications: non-steroidal anti-inflammatory drugs (i.e. Advil, Aleve), vitamins, and supplements. (Some surgeon’s will want you to stop these medications prior to surgery and others may allow you to take them)   **If you are currently taking Plavix, Coumadin, Aspirin and/or other blood-thinning agents, contact your surgeon for instructions.** Your surgeon will partner with the physician prescribing these medications to determine if it is safe to stop or if you need to continue taking. Please do not

## 2024-07-29 NOTE — PERIOP NOTE
Spoke to patient and stated that she is not seeing a neurologist. She was told by PCP that she can stop Aspirin and Plavix before surgery as per Dr. Hurt's instruction. She will call Dr. Hurt's office that she is on Aspirin and Plavix .

## 2024-07-31 NOTE — PERIOP NOTE
Patient stated that she was diagnosed for sleep apnea before, stated \" they did not give me a machine.\"

## 2024-08-05 ENCOUNTER — APPOINTMENT (OUTPATIENT)
Facility: HOSPITAL | Age: 69
End: 2024-08-05
Payer: MEDICARE

## 2024-08-05 ENCOUNTER — HOSPITAL ENCOUNTER (EMERGENCY)
Facility: HOSPITAL | Age: 69
Discharge: HOME | End: 2024-08-05
Attending: EMERGENCY MEDICINE
Payer: MEDICARE

## 2024-08-05 ENCOUNTER — ANESTHESIA EVENT (OUTPATIENT)
Facility: HOSPITAL | Age: 69
End: 2024-08-05
Payer: MEDICARE

## 2024-08-05 VITALS
TEMPERATURE: 98 F | SYSTOLIC BLOOD PRESSURE: 131 MMHG | HEIGHT: 61 IN | RESPIRATION RATE: 18 BRPM | HEART RATE: 81 BPM | BODY MASS INDEX: 26.24 KG/M2 | OXYGEN SATURATION: 99 % | DIASTOLIC BLOOD PRESSURE: 87 MMHG | WEIGHT: 139 LBS

## 2024-08-05 DIAGNOSIS — M79.641 HAND PAIN, RIGHT: Primary | ICD-10-CM

## 2024-08-05 PROCEDURE — 99283 EMERGENCY DEPT VISIT LOW MDM: CPT

## 2024-08-05 PROCEDURE — 6370000000 HC RX 637 (ALT 250 FOR IP): Performed by: EMERGENCY MEDICINE

## 2024-08-05 PROCEDURE — 73130 X-RAY EXAM OF HAND: CPT

## 2024-08-05 RX ORDER — IBUPROFEN 400 MG/1
400 TABLET ORAL
Status: COMPLETED | OUTPATIENT
Start: 2024-08-05 | End: 2024-08-05

## 2024-08-05 RX ORDER — CEPHALEXIN 500 MG/1
500 CAPSULE ORAL 4 TIMES DAILY
Qty: 20 CAPSULE | Refills: 0 | Status: SHIPPED | OUTPATIENT
Start: 2024-08-05 | End: 2024-08-10

## 2024-08-05 RX ORDER — FLUCONAZOLE 150 MG/1
150 TABLET ORAL ONCE
Qty: 1 TABLET | Refills: 0 | Status: SHIPPED | OUTPATIENT
Start: 2024-08-05 | End: 2024-08-05

## 2024-08-05 RX ADMIN — IBUPROFEN 400 MG: 400 TABLET, FILM COATED ORAL at 08:58

## 2024-08-05 ASSESSMENT — PAIN SCALES - GENERAL
PAINLEVEL_OUTOF10: 8
PAINLEVEL_OUTOF10: 3
PAINLEVEL_OUTOF10: 8

## 2024-08-05 ASSESSMENT — PAIN DESCRIPTION - ORIENTATION
ORIENTATION: RIGHT
ORIENTATION: RIGHT

## 2024-08-05 ASSESSMENT — PAIN DESCRIPTION - DESCRIPTORS
DESCRIPTORS: ACHING;DISCOMFORT
DESCRIPTORS: CRAMPING;DISCOMFORT

## 2024-08-05 ASSESSMENT — PAIN DESCRIPTION - LOCATION
LOCATION: HAND

## 2024-08-05 ASSESSMENT — PAIN - FUNCTIONAL ASSESSMENT: PAIN_FUNCTIONAL_ASSESSMENT: 0-10

## 2024-08-05 NOTE — ED NOTES
I called and spoke to radiology tech regarding the delay in having xray read and she will call and find out what the delay is

## 2024-08-05 NOTE — PERIOP NOTE
Pt states that she was seen today at ER for swelling/pain to right hand and was prescribed Keflex. She denies fever, aches, chills.     Called Dr. Tucker office to inform her of this. Dr. Hurt is aware and okay to proceed with surgery tomorrow as long as pt isnt feeling ill or has spiked a fever.     Spoke to pt and informed her what Dr. Hurt said. She states that if she is ill or has a fever tomorrow morning that she will call to cancel surgery.

## 2024-08-05 NOTE — ED TRIAGE NOTES
Pt arrived with hand swelling.  Pt reports on Sunday she noted her hand was swelling and painful, pt reports the swelling continues today.  Pt denies injury.  Pt is awake alert and oriented X 4, pt educated on ER flow  
Statement Selected

## 2024-08-06 ENCOUNTER — ANESTHESIA (OUTPATIENT)
Facility: HOSPITAL | Age: 69
End: 2024-08-06
Payer: MEDICARE

## 2024-08-06 ENCOUNTER — HOSPITAL ENCOUNTER (OUTPATIENT)
Facility: HOSPITAL | Age: 69
Setting detail: OUTPATIENT SURGERY
Discharge: HOME | End: 2024-08-06
Attending: OPHTHALMOLOGY | Admitting: OPHTHALMOLOGY
Payer: MEDICARE

## 2024-08-06 VITALS
OXYGEN SATURATION: 100 % | HEIGHT: 61 IN | TEMPERATURE: 98 F | BODY MASS INDEX: 26.43 KG/M2 | SYSTOLIC BLOOD PRESSURE: 163 MMHG | RESPIRATION RATE: 17 BRPM | DIASTOLIC BLOOD PRESSURE: 85 MMHG | HEART RATE: 63 BPM | WEIGHT: 140 LBS

## 2024-08-06 LAB
GLUCOSE BLD STRIP.AUTO-MCNC: 130 MG/DL (ref 65–117)
SERVICE CMNT-IMP: ABNORMAL

## 2024-08-06 PROCEDURE — 82962 GLUCOSE BLOOD TEST: CPT

## 2024-08-06 PROCEDURE — 2720000010 HC SURG SUPPLY STERILE: Performed by: OPHTHALMOLOGY

## 2024-08-06 PROCEDURE — 6370000000 HC RX 637 (ALT 250 FOR IP): Performed by: OPHTHALMOLOGY

## 2024-08-06 PROCEDURE — 2500000003 HC RX 250 WO HCPCS: Performed by: OPHTHALMOLOGY

## 2024-08-06 PROCEDURE — 6370000000 HC RX 637 (ALT 250 FOR IP)

## 2024-08-06 PROCEDURE — 3600000003 HC SURGERY LEVEL 3 BASE: Performed by: OPHTHALMOLOGY

## 2024-08-06 PROCEDURE — 7100000010 HC PHASE II RECOVERY - FIRST 15 MIN: Performed by: OPHTHALMOLOGY

## 2024-08-06 PROCEDURE — 6360000002 HC RX W HCPCS: Performed by: NURSE ANESTHETIST, CERTIFIED REGISTERED

## 2024-08-06 PROCEDURE — 7100000000 HC PACU RECOVERY - FIRST 15 MIN: Performed by: OPHTHALMOLOGY

## 2024-08-06 PROCEDURE — 2580000003 HC RX 258: Performed by: ANESTHESIOLOGY

## 2024-08-06 PROCEDURE — 6360000002 HC RX W HCPCS: Performed by: OPHTHALMOLOGY

## 2024-08-06 PROCEDURE — 2500000003 HC RX 250 WO HCPCS

## 2024-08-06 PROCEDURE — V2632 POST CHMBR INTRAOCULAR LENS: HCPCS | Performed by: OPHTHALMOLOGY

## 2024-08-06 PROCEDURE — 7100000011 HC PHASE II RECOVERY - ADDTL 15 MIN: Performed by: OPHTHALMOLOGY

## 2024-08-06 PROCEDURE — 3600000013 HC SURGERY LEVEL 3 ADDTL 15MIN: Performed by: OPHTHALMOLOGY

## 2024-08-06 PROCEDURE — 3700000000 HC ANESTHESIA ATTENDED CARE: Performed by: OPHTHALMOLOGY

## 2024-08-06 PROCEDURE — 2709999900 HC NON-CHARGEABLE SUPPLY: Performed by: OPHTHALMOLOGY

## 2024-08-06 PROCEDURE — 2500000003 HC RX 250 WO HCPCS: Performed by: NURSE ANESTHETIST, CERTIFIED REGISTERED

## 2024-08-06 PROCEDURE — 3700000001 HC ADD 15 MINUTES (ANESTHESIA): Performed by: OPHTHALMOLOGY

## 2024-08-06 DEVICE — STERILE UV AND BLUE LIGHT FILTERING ACRYLIC FOLDABLE ASPHERIC POSTERIOR CHAMBER INTRAOCULAR LENS
Type: IMPLANTABLE DEVICE | Site: EYE | Status: FUNCTIONAL
Brand: CLAREON

## 2024-08-06 RX ORDER — HYDRALAZINE HYDROCHLORIDE 20 MG/ML
10 INJECTION INTRAMUSCULAR; INTRAVENOUS
Status: DISCONTINUED | OUTPATIENT
Start: 2024-08-06 | End: 2024-08-06 | Stop reason: HOSPADM

## 2024-08-06 RX ORDER — CYCLOPENTOLATE HYDROCHLORIDE 20 MG/ML
1 SOLUTION/ DROPS OPHTHALMIC SEE ADMIN INSTRUCTIONS
Status: COMPLETED | OUTPATIENT
Start: 2024-08-06 | End: 2024-08-06

## 2024-08-06 RX ORDER — CYCLOPENTOLATE HYDROCHLORIDE 20 MG/ML
SOLUTION/ DROPS OPHTHALMIC
Status: COMPLETED
Start: 2024-08-06 | End: 2024-08-06

## 2024-08-06 RX ORDER — FENTANYL CITRATE 50 UG/ML
100 INJECTION, SOLUTION INTRAMUSCULAR; INTRAVENOUS
Status: DISCONTINUED | OUTPATIENT
Start: 2024-08-06 | End: 2024-08-06 | Stop reason: HOSPADM

## 2024-08-06 RX ORDER — PHENYLEPHRINE HYDROCHLORIDE 25 MG/ML
1 SOLUTION/ DROPS OPHTHALMIC SEE ADMIN INSTRUCTIONS
Status: COMPLETED | OUTPATIENT
Start: 2024-08-06 | End: 2024-08-06

## 2024-08-06 RX ORDER — ACETAMINOPHEN 500 MG
1000 TABLET ORAL ONCE
Status: DISCONTINUED | OUTPATIENT
Start: 2024-08-06 | End: 2024-08-06 | Stop reason: HOSPADM

## 2024-08-06 RX ORDER — NEOMYCIN SULFATE, POLYMYXIN B SULFATE, AND DEXAMETHASONE 3.5; 10000; 1 MG/G; [USP'U]/G; MG/G
OINTMENT OPHTHALMIC ONCE
Status: COMPLETED | OUTPATIENT
Start: 2024-08-06 | End: 2024-08-06

## 2024-08-06 RX ORDER — TETRACAINE HYDROCHLORIDE 5 MG/ML
1 SOLUTION OPHTHALMIC ONCE
Status: DISCONTINUED | OUTPATIENT
Start: 2024-08-06 | End: 2024-08-06 | Stop reason: HOSPADM

## 2024-08-06 RX ORDER — ONDANSETRON 2 MG/ML
4 INJECTION INTRAMUSCULAR; INTRAVENOUS ONCE
Status: DISCONTINUED | OUTPATIENT
Start: 2024-08-06 | End: 2024-08-06 | Stop reason: HOSPADM

## 2024-08-06 RX ORDER — SODIUM CHLORIDE 0.9 % (FLUSH) 0.9 %
5-40 SYRINGE (ML) INJECTION EVERY 12 HOURS SCHEDULED
Status: DISCONTINUED | OUTPATIENT
Start: 2024-08-06 | End: 2024-08-06 | Stop reason: HOSPADM

## 2024-08-06 RX ORDER — DEXAMETHASONE SODIUM PHOSPHATE 4 MG/ML
4 INJECTION, SOLUTION INTRA-ARTICULAR; INTRALESIONAL; INTRAMUSCULAR; INTRAVENOUS; SOFT TISSUE
Status: DISCONTINUED | OUTPATIENT
Start: 2024-08-06 | End: 2024-08-06 | Stop reason: HOSPADM

## 2024-08-06 RX ORDER — LIDOCAINE HYDROCHLORIDE 20 MG/ML
INJECTION, SOLUTION EPIDURAL; INFILTRATION; INTRACAUDAL; PERINEURAL PRN
Status: DISCONTINUED | OUTPATIENT
Start: 2024-08-06 | End: 2024-08-06 | Stop reason: SDUPTHER

## 2024-08-06 RX ORDER — SODIUM CHLORIDE 0.9 % (FLUSH) 0.9 %
5-40 SYRINGE (ML) INJECTION PRN
Status: DISCONTINUED | OUTPATIENT
Start: 2024-08-06 | End: 2024-08-06 | Stop reason: HOSPADM

## 2024-08-06 RX ORDER — TROPICAMIDE 10 MG/ML
1 SOLUTION/ DROPS OPHTHALMIC SEE ADMIN INSTRUCTIONS
Status: COMPLETED | OUTPATIENT
Start: 2024-08-06 | End: 2024-08-06

## 2024-08-06 RX ORDER — PHENYLEPHRINE HYDROCHLORIDE 25 MG/ML
SOLUTION/ DROPS OPHTHALMIC
Status: COMPLETED
Start: 2024-08-06 | End: 2024-08-06

## 2024-08-06 RX ORDER — TROPICAMIDE 10 MG/ML
SOLUTION/ DROPS OPHTHALMIC
Status: COMPLETED
Start: 2024-08-06 | End: 2024-08-06

## 2024-08-06 RX ORDER — SODIUM CHLORIDE 9 MG/ML
INJECTION, SOLUTION INTRAVENOUS PRN
Status: DISCONTINUED | OUTPATIENT
Start: 2024-08-06 | End: 2024-08-06 | Stop reason: HOSPADM

## 2024-08-06 RX ORDER — DICLOFENAC SODIUM 1 MG/ML
1 SOLUTION/ DROPS OPHTHALMIC SEE ADMIN INSTRUCTIONS
Status: COMPLETED | OUTPATIENT
Start: 2024-08-06 | End: 2024-08-06

## 2024-08-06 RX ORDER — PROPARACAINE HYDROCHLORIDE 5 MG/ML
SOLUTION/ DROPS OPHTHALMIC
Status: COMPLETED
Start: 2024-08-06 | End: 2024-08-06

## 2024-08-06 RX ORDER — FENTANYL CITRATE 50 UG/ML
25 INJECTION, SOLUTION INTRAMUSCULAR; INTRAVENOUS EVERY 5 MIN PRN
Status: DISCONTINUED | OUTPATIENT
Start: 2024-08-06 | End: 2024-08-06 | Stop reason: HOSPADM

## 2024-08-06 RX ORDER — SODIUM CHLORIDE, SODIUM LACTATE, POTASSIUM CHLORIDE, CALCIUM CHLORIDE 600; 310; 30; 20 MG/100ML; MG/100ML; MG/100ML; MG/100ML
INJECTION, SOLUTION INTRAVENOUS CONTINUOUS
Status: DISCONTINUED | OUTPATIENT
Start: 2024-08-06 | End: 2024-08-06 | Stop reason: HOSPADM

## 2024-08-06 RX ORDER — PROCHLORPERAZINE EDISYLATE 5 MG/ML
5 INJECTION INTRAMUSCULAR; INTRAVENOUS
Status: DISCONTINUED | OUTPATIENT
Start: 2024-08-06 | End: 2024-08-06 | Stop reason: HOSPADM

## 2024-08-06 RX ORDER — MIDAZOLAM HYDROCHLORIDE 2 MG/2ML
2 INJECTION, SOLUTION INTRAMUSCULAR; INTRAVENOUS
Status: DISCONTINUED | OUTPATIENT
Start: 2024-08-06 | End: 2024-08-06 | Stop reason: HOSPADM

## 2024-08-06 RX ORDER — DICLOFENAC SODIUM 1 MG/ML
SOLUTION/ DROPS OPHTHALMIC
Status: COMPLETED
Start: 2024-08-06 | End: 2024-08-06

## 2024-08-06 RX ORDER — PROPARACAINE HYDROCHLORIDE 5 MG/ML
1 SOLUTION/ DROPS OPHTHALMIC SEE ADMIN INSTRUCTIONS
Status: COMPLETED | OUTPATIENT
Start: 2024-08-06 | End: 2024-08-06

## 2024-08-06 RX ORDER — NALOXONE HYDROCHLORIDE 0.4 MG/ML
INJECTION, SOLUTION INTRAMUSCULAR; INTRAVENOUS; SUBCUTANEOUS PRN
Status: DISCONTINUED | OUTPATIENT
Start: 2024-08-06 | End: 2024-08-06 | Stop reason: HOSPADM

## 2024-08-06 RX ADMIN — PHENYLEPHRINE HYDROCHLORIDE 1 DROP: 25 SOLUTION/ DROPS OPHTHALMIC at 08:56

## 2024-08-06 RX ADMIN — CYCLOPENTOLATE HYDROCHLORIDE 1 DROP: 20 SOLUTION/ DROPS OPHTHALMIC at 08:56

## 2024-08-06 RX ADMIN — DICLOFENAC SODIUM 1 DROP: 1 SOLUTION/ DROPS OPHTHALMIC at 08:57

## 2024-08-06 RX ADMIN — TROPICAMIDE 1 DROP: 10 SOLUTION/ DROPS OPHTHALMIC at 08:56

## 2024-08-06 RX ADMIN — PHENYLEPHRINE HYDROCHLORIDE 1 DROP: 25 SOLUTION/ DROPS OPHTHALMIC at 09:02

## 2024-08-06 RX ADMIN — CYCLOPENTOLATE HYDROCHLORIDE 1 DROP: 20 SOLUTION/ DROPS OPHTHALMIC at 09:07

## 2024-08-06 RX ADMIN — PROPOFOL 50 MG: 10 INJECTION, EMULSION INTRAVENOUS at 09:56

## 2024-08-06 RX ADMIN — DICLOFENAC SODIUM 1 DROP: 1 SOLUTION/ DROPS OPHTHALMIC at 09:02

## 2024-08-06 RX ADMIN — TROPICAMIDE 1 DROP: 10 SOLUTION/ DROPS OPHTHALMIC at 09:02

## 2024-08-06 RX ADMIN — CYCLOPENTOLATE HYDROCHLORIDE 1 DROP: 20 SOLUTION/ DROPS OPHTHALMIC at 09:02

## 2024-08-06 RX ADMIN — DICLOFENAC SODIUM 1 DROP: 1 SOLUTION/ DROPS OPHTHALMIC at 09:07

## 2024-08-06 RX ADMIN — TROPICAMIDE 1 DROP: 10 SOLUTION/ DROPS OPHTHALMIC at 09:07

## 2024-08-06 RX ADMIN — LIDOCAINE HYDROCHLORIDE 100 MG: 20 INJECTION, SOLUTION EPIDURAL; INFILTRATION; INTRACAUDAL; PERINEURAL at 09:56

## 2024-08-06 RX ADMIN — PROPARACAINE HYDROCHLORIDE 1 DROP: 5 SOLUTION/ DROPS OPHTHALMIC at 08:56

## 2024-08-06 RX ADMIN — PHENYLEPHRINE HYDROCHLORIDE 1 DROP: 25 SOLUTION/ DROPS OPHTHALMIC at 09:07

## 2024-08-06 RX ADMIN — PROPARACAINE HYDROCHLORIDE 1 DROP: 5 SOLUTION/ DROPS OPHTHALMIC at 09:07

## 2024-08-06 RX ADMIN — SODIUM CHLORIDE: 9 INJECTION, SOLUTION INTRAVENOUS at 08:58

## 2024-08-06 ASSESSMENT — PAIN - FUNCTIONAL ASSESSMENT: PAIN_FUNCTIONAL_ASSESSMENT: NONE - DENIES PAIN

## 2024-08-06 NOTE — OP NOTE
Operative Note      Patient: Effie Melchor  YOB: 1955  MRN: [id number]    Date of Procedure: 8/6/2024    Pre-Op Diagnosis Codes:     * Combined forms of age-related cataract of right eye [H25.811]    Post-Op Diagnosis: Same       Procedure(s):  RIGHT EYE PHACOEMULSIFICATION WITH INTRAOCULAR LENS IMPLANT WITH MALYUGIN RING(MAC W/RETROBULBAR)    Surgeon(s):  Karyna Hrut MD    Assistant:   * No surgical staff found *    Anesthesia: Monitor Anesthesia Care    Estimated Blood Loss (mL): Minimal    Complications: None    Specimens:   * No specimens in log *    Implants:  Implant Name Type Inv. Item Serial No.  Lot No. LRB No. Used Action   LENS IOL CLAREON 22.5D - [id number]  LENS IOL CLAREON 22.5D [id number] [employer]-  Right 1 Implanted         Drains: * No LDAs found *    Findings:  Infection Present At Time Of Surgery (PATOS) (choose all levels that have infection present):  No infection present  Other Findings: none    After informed consent was obtained, the patient was brought into the operating suite.  MAC with sedation and a retrobulbar block using a 50/50 mixture of lidocaine 2% and Marcaine 0.75% with added Amphatase was administered without complication.      The patient was prepped and draped in the usual sterile ophthalmic fashion.  A wire lid speculum was placed.  A paracentesis was made using a 75 blade.  The eye was filled with Provisc.  A 2.8mm keratome was used to make a temporal clear corneal incision.  The pupil was too small for surgery.  A 6.25mm Malyugin ring was placed to stretch the iris and create sphincterotomies.  A cystotome and Utrada forcep was used to make a continuous curvilinear capsulorrhexis without complication.  Hydrodisection was performed until the nuclear rotated freely in the capsular bag.  The cataract was removed using a two handed divide and conquer technique using a Pearl as a second instrument.  Irrigation/aspiration was

## 2024-08-06 NOTE — PERIOP NOTE
Effie TAYLOR Jill  1955  [id number]    Situation:  Verbal report given from: MITCH Mills CRNA, RN  Procedure: Procedure(s):  RIGHT EYE PHACOEMULSIFICATION WITH INTRAOCULAR LENS IMPLANT WITH MALYUGIN RING(MAC W/RETROBULBAR)    Background:    Preoperative diagnosis: Combined forms of age-related cataract of right eye [H25.811]    Postoperative diagnosis: * No post-op diagnosis entered *    :  Dr. Hurt    Assistant(s): Circulator: Mariluz Padilla, SANJU  Scrub Person First: Aj Valencia  Circulator Assist: Elvin Nicole RN    Specimens: * No specimens in log *    Assessment:  Intra-procedure medications         Anesthesia gave intra-procedure sedation and medications, see anesthesia flow sheet     Intravenous fluids: LR@ KVO     Vital signs stable       Recommendation:

## 2024-08-06 NOTE — DISCHARGE INSTRUCTIONS
Dr. Karyna Hurt Vision  [facility]  [address] [place] [address] [phone]    Cataract Post Operative Instructions    Diet - you may eat a normal diet but avoid spicy or greasy tonight.    Activity - Limit heavy lifting - do not lift more than 20 pounds for the next 7 days.    Leave your eye patch on tonight. Your eye should remain closed under the patch. Your patch will be removed in Dr. Hurt's office tomorrow.     Most people do not have significant pain after cataract surgery. You may take any over-the-counter pain medication that you normally take as needed.     You may resume all of your pre-operative medications.     You should have your postoperative drops. If you do not[facility] You will start eyedrops TOMORROW.     You have an appointment with Dr. Hurt tomorrmansoor in her office.    Date: 8/7/2024 Time: 8:30 am    If you need to speak to Dr. Hurt after business hours, please call [phone].    DO NOT TAKE SLEEPING MEDICATIONS OR ANTIANXIETY MEDICATIONS WHILE TAKING NARCOTIC PAIN MEDICATIONS,  ESPECIALLY THE NIGHT OF ANESTHESIA.    CPAP PATIENTS BE SURE TO WEAR MACHINE WHENEVER NAPPING OR SLEEPING.      PATIENT INSTRUCTIONS:    Anesthesia Discharge Instructions for Procedural Area requiring Sedation (MAC Anesthesia, Cath Lab, Endo and Radiology):   You have been given medications during your procedure that may affect your memory and mental judgement for the next 24 hours. During this time frame for your safety, please follow the instructions listed below :   Have a responsible adult to drive you home and be with you for at least 24 hours.   Rest today and resume normal activities tomorrow.   Start with a soft bland diet and advance as tolerated to your recommended diet.   Do not drive any motor vehicle or operate mechanical or electrical equipment prior to your appointment tomorrow.   Avoid making critical

## 2024-08-06 NOTE — ANESTHESIA PRE PROCEDURE
Department of Anesthesiology  Preprocedure Note       Name:  Effie Melchor   Age:  69 y.o.  :  1955                                          MRN:  [id number]         Date:  2024      Surgeon: Surgeon(s):  Karyna Hurt MD    Procedure: Procedure(s):  RIGHT EYE PHACOEMULSIFICATION WITH INTRAOCULAR LENS IMPLANT WITH MALYUGIN RING WITH POSSIBLE TRYPAN BLUE STAIN (MAC W/RETROBULBAR)    Medications prior to admission:   Prior to Admission medications    Medication Sig Start Date End Date Taking? Authorizing Provider   aspirin 81 MG EC tablet Take 1 tablet by mouth daily Prescribed by PCP   Yes Feliciano Chaudhary MD   atorvastatin (LIPITOR) 40 MG tablet Take 1 tablet by mouth daily   Yes Feliciano Chaudhary MD   clopidogrel (PLAVIX) 75 MG tablet Take 1 tablet by mouth daily Prescribed by PCP   Yes Feliciano Chaudhary MD   DULOXETINE HCL PO Take 40 mg by mouth daily   Yes Feliciano Chaudhary MD   empagliflozin (JARDIANCE) 10 MG tablet Take 1 tablet by mouth daily   Yes Feliciano Chaudhary MD   gabapentin (NEURONTIN) 300 MG capsule Take 1 capsule by mouth. 1 tablet in the morning and 2 tablet at bedtime   Yes Feliciano Chaudhary MD   lidocaine (LIDODERM) 5 % Place 1 patch onto the skin daily 12 hours on, 12 hours off.   Yes Feliciano Chaudhary MD   Melatonin 10 MG TABS Take 1 tablet by mouth nightly   Yes Feliciano Chaudhary MD   metoprolol tartrate (LOPRESSOR) 25 MG tablet Take 0.5 tablets by mouth 2 times daily   Yes Feliciano Chaudhary MD   pantoprazole (PROTONIX) 40 MG tablet Take 1 tablet by mouth daily   Yes Feliciano Chaudhary MD   pioglitazone (ACTOS) 15 MG tablet Take 1 tablet by mouth daily   Yes Feliciano Chaudhary MD   cephALEXin (KEFLEX) 500 MG capsule Take 1 capsule by mouth 4 times daily for 5 days 8/5/24 8/10/24  Khang Duffy MD   hydroCHLOROthiazide 12.5 MG capsule Take 1 capsule by mouth daily 3/15/22   Feliciano Chaudhary MD   magnesium oxide (MAG-OX) 400

## 2024-08-06 NOTE — PERIOP NOTE
Pt tolerating liquids, vss.  Pt denies pain.  Pt is wearing a brace on R hand from potential cellulitis.  Pt verbalizes she has an antibiotic she hasn't started yet, thinking of starting tomorrow.  Discussed to start today and why.  Reviewed d/c instructions with pt's Daughter, discussed sleep apnea with Daughter and pt.  Pt was tested a number of years ago, but never got results.  Suggested to pt and daughter to have pt retested at this point and after talking to pt it does run in the family.  Reviewed when to call the doctor,diet,activity and hygiene.  Reviewed when/what to take for pain, be careful walking around and when to see the pt.  Transported pt via w/c to awaiting transportation.  Pt has cane.

## 2024-08-06 NOTE — ANESTHESIA POSTPROCEDURE EVALUATION
Department of Anesthesiology  Postprocedure Note    Patient: Effie Melchor  MRN: [id number]  YOB: 1955  Date of evaluation: 8/6/2024    Procedure Summary       Date: 08/06/24 Room / Location: [facility] ASU B3 / [facility] AMBULATORY OR    Anesthesia Start: 0951 Anesthesia Stop: 1019    Procedure: RIGHT EYE PHACOEMULSIFICATION WITH INTRAOCULAR LENS IMPLANT WITH MALYUGIN RING(MAC W/RETROBULBAR) (Right: Eye) Diagnosis:       Combined forms of age-related cataract of right eye      (Combined forms of age-related cataract of right eye [H25.811])    Surgeons: Karyna Hurt MD Responsible Provider: Ricky Murphy MD    Anesthesia Type: MAC ASA Status: 3            Anesthesia Type: MAC    Sukhjinder Phase I: Sukhjinder Score: 10    Sukhjinder Phase II: Sukhjinder Score: 10    Anesthesia Post Evaluation    Patient location during evaluation: PACU  Patient participation: complete - patient participated  Level of consciousness: awake and alert  Airway patency: patent  Nausea & Vomiting: no nausea and no vomiting  Cardiovascular status: hemodynamically stable  Respiratory status: acceptable  Hydration status: stable    No notable events documented.

## 2024-08-06 NOTE — PERIOP NOTE
Permission received to review discharge instructions and discuss private health information with daughter Federico.    Patient states family/friend will be with them for 24 hours following procedure.      0937: Pt last dose of aspirin was 8/4/2024. Patient did take Plavix today, per Dr. Blu eagle to proceed with scheduled procedure today.

## 2024-08-06 NOTE — ED PROVIDER NOTES
for this patient encounter.    Khang Duffy MD        Please note that this dictation was completed with Dragon, computer voice recognition software.  Quite often unanticipated grammatical, syntax, homophones, and other interpretive errors are inadvertently transcribed by the computer software.  Please disregard these errors.  Additionally, please excuse any errors that have escaped final proofreading.          Khang Duffy MD  08/06/24 1300

## 2024-08-16 NOTE — PERIOP NOTE
[facility]  Ambulatory Surgery Unit  Pre-operative Instructions    Surgery/Procedure Date  Tuesday 8/20            Tentative Arrival Time TBD      1. On the day of your surgery/procedure, please report to the Ambulatory Surgery Unit Registration Desk and sign in at your designated time. The Ambulatory Surgery Unit is located in [facility] on the [facility] of the \[facility] [place] [facility]\"" across from the [facility]. Please have all of your health insurance cards, co-payment, and a photo ID.    **TWO adults may accompany you the day of the procedure.  We have limited seating available.      2. You cannot be dropped off for surgery.  Please make arrangements for a responsible adult friend or family member to remain on the hospital campus during your procedure, and drive you home, as you should not drive for 24 hours following anesthesia. Make arrangements for a responsible adult to stay with you for at least the first 24 hours after your surgery.    3. Do not have anything to eat or drink (including water, gum, mints, coffee, juice) after 11:59 PM on Monday 8/19. This may not apply to medications prescribed by your physician.  (Please note below the special instructions with medications to take the morning of surgery, if applicable.)    4. We recommend you do not drink any alcoholic beverages for 24 hours before and after your surgery.    5. Contact your surgeon’s office for instructions on the following medications: non-steroidal anti-inflammatory drugs (i.e. Advil, Aleve), vitamins, and supplements. (Some surgeon’s will want you to stop these medications prior to surgery and others may allow you to take them)   **If you are currently taking Plavix, Coumadin, Aspirin and/or other blood-thinning agents, contact your surgeon for instructions.** Your surgeon will partner with the physician prescribing these medications to determine if it is safe to stop or if you need to continue taking. Please

## 2024-08-19 ENCOUNTER — ANESTHESIA EVENT (OUTPATIENT)
Facility: HOSPITAL | Age: 69
End: 2024-08-19
Payer: MEDICARE

## 2024-08-20 ENCOUNTER — ANESTHESIA (OUTPATIENT)
Facility: HOSPITAL | Age: 69
End: 2024-08-20
Payer: MEDICARE

## 2024-08-20 ENCOUNTER — HOSPITAL ENCOUNTER (OUTPATIENT)
Facility: HOSPITAL | Age: 69
Setting detail: OUTPATIENT SURGERY
Discharge: HOME | End: 2024-08-20
Attending: OPHTHALMOLOGY | Admitting: OPHTHALMOLOGY
Payer: MEDICARE

## 2024-08-20 VITALS
HEIGHT: 61 IN | RESPIRATION RATE: 17 BRPM | OXYGEN SATURATION: 100 % | DIASTOLIC BLOOD PRESSURE: 94 MMHG | WEIGHT: 139 LBS | HEART RATE: 72 BPM | BODY MASS INDEX: 26.24 KG/M2 | TEMPERATURE: 97.6 F | SYSTOLIC BLOOD PRESSURE: 161 MMHG

## 2024-08-20 LAB
GLUCOSE BLD STRIP.AUTO-MCNC: 146 MG/DL (ref 65–117)
SERVICE CMNT-IMP: ABNORMAL

## 2024-08-20 PROCEDURE — 6360000002 HC RX W HCPCS: Performed by: OPHTHALMOLOGY

## 2024-08-20 PROCEDURE — 3600000013 HC SURGERY LEVEL 3 ADDTL 15MIN: Performed by: OPHTHALMOLOGY

## 2024-08-20 PROCEDURE — 2580000003 HC RX 258: Performed by: ANESTHESIOLOGY

## 2024-08-20 PROCEDURE — 2500000003 HC RX 250 WO HCPCS: Performed by: NURSE ANESTHETIST, CERTIFIED REGISTERED

## 2024-08-20 PROCEDURE — 3600000003 HC SURGERY LEVEL 3 BASE: Performed by: OPHTHALMOLOGY

## 2024-08-20 PROCEDURE — 6360000002 HC RX W HCPCS: Performed by: NURSE ANESTHETIST, CERTIFIED REGISTERED

## 2024-08-20 PROCEDURE — V2632 POST CHMBR INTRAOCULAR LENS: HCPCS | Performed by: OPHTHALMOLOGY

## 2024-08-20 PROCEDURE — 2500000003 HC RX 250 WO HCPCS

## 2024-08-20 PROCEDURE — 6370000000 HC RX 637 (ALT 250 FOR IP)

## 2024-08-20 PROCEDURE — 6370000000 HC RX 637 (ALT 250 FOR IP): Performed by: OPHTHALMOLOGY

## 2024-08-20 PROCEDURE — 7100000000 HC PACU RECOVERY - FIRST 15 MIN: Performed by: OPHTHALMOLOGY

## 2024-08-20 PROCEDURE — 3700000000 HC ANESTHESIA ATTENDED CARE: Performed by: OPHTHALMOLOGY

## 2024-08-20 PROCEDURE — 2500000003 HC RX 250 WO HCPCS: Performed by: OPHTHALMOLOGY

## 2024-08-20 PROCEDURE — 2709999900 HC NON-CHARGEABLE SUPPLY: Performed by: OPHTHALMOLOGY

## 2024-08-20 PROCEDURE — 3700000001 HC ADD 15 MINUTES (ANESTHESIA): Performed by: OPHTHALMOLOGY

## 2024-08-20 PROCEDURE — 7100000010 HC PHASE II RECOVERY - FIRST 15 MIN: Performed by: OPHTHALMOLOGY

## 2024-08-20 PROCEDURE — 7100000011 HC PHASE II RECOVERY - ADDTL 15 MIN: Performed by: OPHTHALMOLOGY

## 2024-08-20 PROCEDURE — 82962 GLUCOSE BLOOD TEST: CPT

## 2024-08-20 DEVICE — CLAREON ASPHERIC HYDROPHOBIC ACRYLIC IOL WITH THE AUTONOMEAUTOMATED PRE-LOADED DELIVERY SYSTEM
Type: IMPLANTABLE DEVICE | Site: EYE | Status: FUNCTIONAL
Brand: CLAREON™

## 2024-08-20 RX ORDER — KETOROLAC TROMETHAMINE 30 MG/ML
15 INJECTION, SOLUTION INTRAMUSCULAR; INTRAVENOUS
Status: DISCONTINUED | OUTPATIENT
Start: 2024-08-20 | End: 2024-08-20 | Stop reason: HOSPADM

## 2024-08-20 RX ORDER — PHENYLEPHRINE HYDROCHLORIDE 25 MG/ML
SOLUTION/ DROPS OPHTHALMIC
Status: COMPLETED
Start: 2024-08-20 | End: 2024-08-20

## 2024-08-20 RX ORDER — FENTANYL CITRATE 50 UG/ML
25 INJECTION, SOLUTION INTRAMUSCULAR; INTRAVENOUS EVERY 5 MIN PRN
Status: DISCONTINUED | OUTPATIENT
Start: 2024-08-20 | End: 2024-08-20 | Stop reason: HOSPADM

## 2024-08-20 RX ORDER — TROPICAMIDE 10 MG/ML
SOLUTION/ DROPS OPHTHALMIC
Status: COMPLETED
Start: 2024-08-20 | End: 2024-08-20

## 2024-08-20 RX ORDER — SODIUM CHLORIDE 0.9 % (FLUSH) 0.9 %
5-40 SYRINGE (ML) INJECTION PRN
Status: DISCONTINUED | OUTPATIENT
Start: 2024-08-20 | End: 2024-08-20 | Stop reason: HOSPADM

## 2024-08-20 RX ORDER — NALOXONE HYDROCHLORIDE 0.4 MG/ML
INJECTION, SOLUTION INTRAMUSCULAR; INTRAVENOUS; SUBCUTANEOUS PRN
Status: DISCONTINUED | OUTPATIENT
Start: 2024-08-20 | End: 2024-08-20 | Stop reason: HOSPADM

## 2024-08-20 RX ORDER — TETRACAINE HYDROCHLORIDE 5 MG/ML
1 SOLUTION OPHTHALMIC ONCE
Status: DISCONTINUED | OUTPATIENT
Start: 2024-08-20 | End: 2024-08-20 | Stop reason: HOSPADM

## 2024-08-20 RX ORDER — NEOMYCIN SULFATE, POLYMYXIN B SULFATE, AND DEXAMETHASONE 3.5; 10000; 1 MG/G; [USP'U]/G; MG/G
OINTMENT OPHTHALMIC ONCE
Status: COMPLETED | OUTPATIENT
Start: 2024-08-20 | End: 2024-08-20

## 2024-08-20 RX ORDER — PROPARACAINE HYDROCHLORIDE 5 MG/ML
SOLUTION/ DROPS OPHTHALMIC
Status: COMPLETED
Start: 2024-08-20 | End: 2024-08-20

## 2024-08-20 RX ORDER — DICLOFENAC SODIUM 1 MG/ML
1 SOLUTION/ DROPS OPHTHALMIC SEE ADMIN INSTRUCTIONS
Status: COMPLETED | OUTPATIENT
Start: 2024-08-20 | End: 2024-08-20

## 2024-08-20 RX ORDER — TROPICAMIDE 10 MG/ML
1 SOLUTION/ DROPS OPHTHALMIC SEE ADMIN INSTRUCTIONS
Status: COMPLETED | OUTPATIENT
Start: 2024-08-20 | End: 2024-08-20

## 2024-08-20 RX ORDER — PHENYLEPHRINE HYDROCHLORIDE 25 MG/ML
1 SOLUTION/ DROPS OPHTHALMIC SEE ADMIN INSTRUCTIONS
Status: COMPLETED | OUTPATIENT
Start: 2024-08-20 | End: 2024-08-20

## 2024-08-20 RX ORDER — ONDANSETRON 2 MG/ML
4 INJECTION INTRAMUSCULAR; INTRAVENOUS
Status: DISCONTINUED | OUTPATIENT
Start: 2024-08-20 | End: 2024-08-20 | Stop reason: HOSPADM

## 2024-08-20 RX ORDER — PROPARACAINE HYDROCHLORIDE 5 MG/ML
1 SOLUTION/ DROPS OPHTHALMIC SEE ADMIN INSTRUCTIONS
Status: COMPLETED | OUTPATIENT
Start: 2024-08-20 | End: 2024-08-20

## 2024-08-20 RX ORDER — SODIUM CHLORIDE 0.9 % (FLUSH) 0.9 %
5-40 SYRINGE (ML) INJECTION EVERY 12 HOURS SCHEDULED
Status: DISCONTINUED | OUTPATIENT
Start: 2024-08-20 | End: 2024-08-20 | Stop reason: HOSPADM

## 2024-08-20 RX ORDER — CYCLOPENTOLATE HYDROCHLORIDE 20 MG/ML
SOLUTION/ DROPS OPHTHALMIC
Status: COMPLETED
Start: 2024-08-20 | End: 2024-08-20

## 2024-08-20 RX ORDER — ACETAMINOPHEN 500 MG
1000 TABLET ORAL
Status: DISCONTINUED | OUTPATIENT
Start: 2024-08-20 | End: 2024-08-20 | Stop reason: HOSPADM

## 2024-08-20 RX ORDER — CYCLOPENTOLATE HYDROCHLORIDE 20 MG/ML
1 SOLUTION/ DROPS OPHTHALMIC SEE ADMIN INSTRUCTIONS
Status: COMPLETED | OUTPATIENT
Start: 2024-08-20 | End: 2024-08-20

## 2024-08-20 RX ORDER — SODIUM CHLORIDE, POTASSIUM CHLORIDE, CALCIUM CHLORIDE, MAGNESIUM CHLORIDE, SODIUM ACETATE, AND SODIUM CITRATE 6.4; .75; .48; .3; 3.9; 1.7 MG/ML; MG/ML; MG/ML; MG/ML; MG/ML; MG/ML
SOLUTION IRRIGATION PRN
Status: DISCONTINUED | OUTPATIENT
Start: 2024-08-20 | End: 2024-08-20 | Stop reason: ALTCHOICE

## 2024-08-20 RX ORDER — DICLOFENAC SODIUM 1 MG/ML
SOLUTION/ DROPS OPHTHALMIC
Status: COMPLETED
Start: 2024-08-20 | End: 2024-08-20

## 2024-08-20 RX ORDER — SODIUM CHLORIDE, SODIUM LACTATE, POTASSIUM CHLORIDE, CALCIUM CHLORIDE 600; 310; 30; 20 MG/100ML; MG/100ML; MG/100ML; MG/100ML
INJECTION, SOLUTION INTRAVENOUS CONTINUOUS
Status: DISCONTINUED | OUTPATIENT
Start: 2024-08-20 | End: 2024-08-20 | Stop reason: HOSPADM

## 2024-08-20 RX ORDER — LIDOCAINE HYDROCHLORIDE 10 MG/ML
1 INJECTION, SOLUTION EPIDURAL; INFILTRATION; INTRACAUDAL; PERINEURAL
Status: DISCONTINUED | OUTPATIENT
Start: 2024-08-20 | End: 2024-08-20 | Stop reason: HOSPADM

## 2024-08-20 RX ORDER — SODIUM CHLORIDE 9 MG/ML
INJECTION, SOLUTION INTRAVENOUS PRN
Status: DISCONTINUED | OUTPATIENT
Start: 2024-08-20 | End: 2024-08-20 | Stop reason: HOSPADM

## 2024-08-20 RX ADMIN — PHENYLEPHRINE HYDROCHLORIDE 1 DROP: 25 SOLUTION/ DROPS OPHTHALMIC at 10:46

## 2024-08-20 RX ADMIN — CYCLOPENTOLATE HYDROCHLORIDE 1 DROP: 20 SOLUTION/ DROPS OPHTHALMIC at 10:41

## 2024-08-20 RX ADMIN — TROPICAMIDE 1 DROP: 10 SOLUTION/ DROPS OPHTHALMIC at 10:36

## 2024-08-20 RX ADMIN — DICLOFENAC SODIUM 1 DROP: 1 SOLUTION/ DROPS OPHTHALMIC at 10:35

## 2024-08-20 RX ADMIN — PROPARACAINE HYDROCHLORIDE 1 DROP: 5 SOLUTION/ DROPS OPHTHALMIC at 10:46

## 2024-08-20 RX ADMIN — LIDOCAINE HYDROCHLORIDE 100 MG: 20 INJECTION, SOLUTION EPIDURAL; INFILTRATION; INTRACAUDAL; PERINEURAL at 11:35

## 2024-08-20 RX ADMIN — SODIUM CHLORIDE: 9 INJECTION, SOLUTION INTRAVENOUS at 10:38

## 2024-08-20 RX ADMIN — DICLOFENAC SODIUM 1 DROP: 1 SOLUTION/ DROPS OPHTHALMIC at 10:46

## 2024-08-20 RX ADMIN — CYCLOPENTOLATE HYDROCHLORIDE 1 DROP: 20 SOLUTION/ DROPS OPHTHALMIC at 10:35

## 2024-08-20 RX ADMIN — PHENYLEPHRINE HYDROCHLORIDE 1 DROP: 25 SOLUTION/ DROPS OPHTHALMIC at 10:36

## 2024-08-20 RX ADMIN — DICLOFENAC SODIUM 1 DROP: 1 SOLUTION/ DROPS OPHTHALMIC at 10:41

## 2024-08-20 RX ADMIN — CYCLOPENTOLATE HYDROCHLORIDE 1 DROP: 20 SOLUTION/ DROPS OPHTHALMIC at 10:46

## 2024-08-20 RX ADMIN — TROPICAMIDE 1 DROP: 10 SOLUTION/ DROPS OPHTHALMIC at 10:41

## 2024-08-20 RX ADMIN — PROPARACAINE HYDROCHLORIDE 1 DROP: 5 SOLUTION/ DROPS OPHTHALMIC at 10:36

## 2024-08-20 RX ADMIN — PHENYLEPHRINE HYDROCHLORIDE 1 DROP: 25 SOLUTION/ DROPS OPHTHALMIC at 10:41

## 2024-08-20 RX ADMIN — PROPOFOL 40 MG: 10 INJECTION, EMULSION INTRAVENOUS at 11:35

## 2024-08-20 RX ADMIN — TROPICAMIDE 1 DROP: 10 SOLUTION/ DROPS OPHTHALMIC at 10:46

## 2024-08-20 NOTE — PERIOP NOTE
Permission received to review discharge instructions and discuss private health information with daughter Alma.    Patient states family/friend will be with them for 24 hours following procedure.

## 2024-08-20 NOTE — PERIOP NOTE
Effiejosé miguel Griffithe  1955  [id number]    Situation:  Verbal report given from: ADRYAN Gray CRNA and MITCH PadillaRN  Procedure: Procedure(s):  LEFT EYE SECOND EYE PHACOEMULSIFICATION WITH  INTRAOCULAR LENS IMPLANT (MAC/RETROBULBAR)    Background:    Preoperative diagnosis: Combined forms of age-related cataract of left eye [H25.812]    Postoperative diagnosis: * No post-op diagnosis entered *    :  Dr. Hurt    Assistant(s): Circulator: Mariluz Padilla, SANJU; Laura Grider RN  Relief Circulator: Elvin Nicole, SANJU  Relief Scrub: Aj Valencia  Scrub Person First: Chandrakant Rizvi    Specimens: * No specimens in log *    Assessment:  Intra-procedure medications         Anesthesia gave intra-procedure sedation and medications, see anesthesia flow sheet     Intravenous fluids: LR@ KVO     Vital signs stable       Recommendation:    Permission to share finding with Daughter Marck

## 2024-08-20 NOTE — ANESTHESIA PRE PROCEDURE
26.26 kg/m².    CBC:   Lab Results   Component Value Date/Time    WBC 19.6 05/04/2024 10:25 PM    RBC 3.52 05/04/2024 10:25 PM    HGB 9.6 05/04/2024 10:25 PM    HCT 28.9 05/04/2024 10:25 PM    MCV 82.1 05/04/2024 10:25 PM    RDW 15.0 05/04/2024 10:25 PM     05/04/2024 10:25 PM       CMP:   Lab Results   Component Value Date/Time     05/04/2024 10:25 PM    K 3.9 05/04/2024 10:25 PM    CL 96 05/04/2024 10:25 PM    CO2 29 05/04/2024 10:25 PM    BUN 14 05/04/2024 10:25 PM    CREATININE 0.78 05/04/2024 10:25 PM    GFRAA >60 09/09/2021 11:40 AM    AGRATIO 1.0 09/09/2021 11:40 AM    LABGLOM 82 05/04/2024 10:25 PM    LABGLOM 51 04/28/2024 11:58 AM    GLUCOSE 180 05/04/2024 10:25 PM    CALCIUM 8.6 05/04/2024 10:25 PM    BILITOT 0.3 05/04/2024 10:25 PM    ALKPHOS 80 05/04/2024 10:25 PM    ALKPHOS 82 09/09/2021 11:40 AM    AST 10 05/04/2024 10:25 PM    ALT 12 05/04/2024 10:25 PM       POC Tests:   Recent Labs     08/20/24  1036   POCGLU 146*       Coags:   Lab Results   Component Value Date/Time    PROTIME 10.9 07/22/2020 10:59 AM    INR 1.0 07/22/2020 10:59 AM       HCG (If Applicable): No results found for: \"PREGTESTUR\", \"PREGSERUM\", \"HCG\", \"HCGQUANT\"     ABGs:   Lab Results   Component Value Date/Time    PHART 7.43 08/25/2020 01:24 AM    PO2ART 85 08/25/2020 01:24 AM    WFH6PGD 42.4 08/25/2020 01:24 AM    HOD1OGT 28.0 08/25/2020 01:24 AM    BEART 4 08/25/2020 01:24 AM        Type & Screen (If Applicable):  No results found for: \"LABABO\"    Drug/Infectious Status (If Applicable):  No results found for: \"HIV\", \"HEPCAB\"    COVID-19 Screening (If Applicable):   Lab Results   Component Value Date/Time    COVID19 Not detected 04/28/2024 11:33 AM           Anesthesia Evaluation  Patient summary reviewed and Nursing notes reviewed   no history of anesthetic complications:   Airway: Mallampati: I     Neck ROM: full  Mouth opening: > = 3 FB   Dental:      Comment: Numerous missing teeth.  Denies any loose teeth

## 2024-08-20 NOTE — DISCHARGE INSTRUCTIONS
Dr. Karyna Hurt Vision  [facility]  [address] [place] [address] [phone]    Cataract Post Operative Instructions    Diet - you may eat a normal diet but avoid spicy or greasy tonight.    Activity - Limit heavy lifting - do not lift more than 20 pounds for the next 7 days.    Leave your eye patch on tonight. Your eye should remain closed under the patch. Your patch will be removed in Dr. Hurt's office tomorrow.     Most people do not have significant pain after cataract surgery. You may take any over-the-counter pain medication that you normally take as needed.     You may resume all of your pre-operative medications.     You should have your postoperative drops. If you do not[facility] You will start eyedrops TOMORROW.     You have an appointment with Dr. Hurt tomorrow in her office.    Date: _____08/21/24___________ Time: ___08:30 am______________    If you need to speak to Dr. Hurt after business hours, please call [phone].    DO NOT TAKE SLEEPING MEDICATIONS OR ANTIANXIETY MEDICATIONS WHILE TAKING NARCOTIC PAIN MEDICATIONS,  ESPECIALLY THE NIGHT OF ANESTHESIA.    CPAP PATIENTS BE SURE TO WEAR MACHINE WHENEVER NAPPING OR SLEEPING.    DISCHARGE SUMMARY from Nurse    The following personal items collected during your admission are returned to you:   Dental Appliance:    Vision:    Hearing Aid:    Jewelry:    Clothing:    Other Valuables:    Valuables sent to safe:        PATIENT INSTRUCTIONS:    Anesthesia Discharge Instructions for Procedural Area requiring Sedation (MAC Anesthesia, Cath Lab, Endo and Radiology):   You have been given medications during your procedure that may affect your memory and mental judgement for the next 24 hours. During this time frame for your safety, please follow the instructions listed below :   Have a responsible adult to drive you home and be with you for at least 24 hours.

## 2024-08-20 NOTE — PERIOP NOTE
Pt discharged via wheelchair, accompanied by RN.  Pt discharged awake and alert, respirations equal and unlabored, skin warm, dry, and intact.  Pt and family members' questions and concerns addressed prior to discharge.

## 2024-08-20 NOTE — ANESTHESIA POSTPROCEDURE EVALUATION
Department of Anesthesiology  Postprocedure Note    Patient: Effie Melchor  MRN: [id number]  YOB: 1955  Date of evaluation: 8/20/2024    Procedure Summary       Date: 08/20/24 Room / Location: [facility]3 / [facility] AM[facility] OR    Anesthesia Start: 1130 Anesthesia Stop: 1204    Procedure: LEFT EYE SECOND EYE PHACOEMULSIFICATION WITH  INTRAOCULAR LENS IMPLANT (MAC/RETROBULBAR) (Left: Eye) Diagnosis:       Combined forms of age-related cataract of left eye      (Combined forms of age-related cataract of left eye [H25.812])    Surgeons: Karyna Hurt MD Responsible Provider: Julia Duckworth MD    Anesthesia Type: MAC, TIVA ASA Status: 3            Anesthesia Type: MAC, TIVA    Sukhjinder Phase I: Sukhjinder Score: 10    Sukhjinder Phase II: Sukhjinder Score: 10    Anesthesia Post Evaluation    Patient location during evaluation: PACU  Patient participation: complete - patient participated  Level of consciousness: awake and alert  Pain score: 0  Airway patency: patent  Nausea & Vomiting: no vomiting and no nausea  Cardiovascular status: blood pressure returned to baseline and hemodynamically stable  Respiratory status: acceptable  Hydration status: stable  There was medical reason for not using a multimodal analgesia pain management approach.Pain management: satisfactory to patient    No notable events documented.

## 2024-11-07 ENCOUNTER — HOSPITAL ENCOUNTER (EMERGENCY)
Facility: HOSPITAL | Age: 69
Discharge: HOME | End: 2024-11-07
Attending: EMERGENCY MEDICINE
Payer: MEDICARE

## 2024-11-07 ENCOUNTER — APPOINTMENT (OUTPATIENT)
Facility: HOSPITAL | Age: 69
End: 2024-11-07
Payer: MEDICARE

## 2024-11-07 VITALS
BODY MASS INDEX: 29.45 KG/M2 | RESPIRATION RATE: 19 BRPM | SYSTOLIC BLOOD PRESSURE: 202 MMHG | TEMPERATURE: 98.3 F | WEIGHT: 156 LBS | DIASTOLIC BLOOD PRESSURE: 98 MMHG | OXYGEN SATURATION: 100 % | HEIGHT: 61 IN | HEART RATE: 98 BPM

## 2024-11-07 DIAGNOSIS — M89.8X1 PAIN OF LEFT SCAPULA: Primary | ICD-10-CM

## 2024-11-07 PROCEDURE — 71045 X-RAY EXAM CHEST 1 VIEW: CPT

## 2024-11-07 PROCEDURE — 6370000000 HC RX 637 (ALT 250 FOR IP): Performed by: EMERGENCY MEDICINE

## 2024-11-07 PROCEDURE — 99284 EMERGENCY DEPT VISIT MOD MDM: CPT

## 2024-11-07 PROCEDURE — 6360000002 HC RX W HCPCS: Performed by: EMERGENCY MEDICINE

## 2024-11-07 PROCEDURE — 96372 THER/PROPH/DIAG INJ SC/IM: CPT

## 2024-11-07 RX ORDER — KETOROLAC TROMETHAMINE 15 MG/ML
15 INJECTION, SOLUTION INTRAMUSCULAR; INTRAVENOUS ONCE
Status: COMPLETED | OUTPATIENT
Start: 2024-11-07 | End: 2024-11-07

## 2024-11-07 RX ORDER — CYCLOBENZAPRINE HCL 10 MG
10 TABLET ORAL ONCE
Status: COMPLETED | OUTPATIENT
Start: 2024-11-07 | End: 2024-11-07

## 2024-11-07 RX ORDER — CYCLOBENZAPRINE HCL 10 MG
10 TABLET ORAL 3 TIMES DAILY PRN
Qty: 21 TABLET | Refills: 0 | Status: SHIPPED | OUTPATIENT
Start: 2024-11-07 | End: 2024-11-17

## 2024-11-07 RX ADMIN — CYCLOBENZAPRINE 10 MG: 10 TABLET, FILM COATED ORAL at 09:16

## 2024-11-07 RX ADMIN — KETOROLAC TROMETHAMINE 15 MG: 15 INJECTION, SOLUTION INTRAMUSCULAR; INTRAVENOUS at 09:16

## 2024-11-07 ASSESSMENT — PAIN SCALES - GENERAL
PAINLEVEL_OUTOF10: 10
PAINLEVEL_OUTOF10: 10

## 2024-11-07 ASSESSMENT — PAIN DESCRIPTION - DESCRIPTORS: DESCRIPTORS: SPASM

## 2024-11-07 ASSESSMENT — PAIN DESCRIPTION - ORIENTATION: ORIENTATION: LEFT

## 2024-11-07 ASSESSMENT — PAIN - FUNCTIONAL ASSESSMENT: PAIN_FUNCTIONAL_ASSESSMENT: 0-10

## 2024-11-07 ASSESSMENT — PAIN DESCRIPTION - LOCATION: LOCATION: SHOULDER

## 2024-11-07 NOTE — ED TRIAGE NOTES
Presents with c/o L shoulder/ back pain. Patient states she woke up yesterday morning experiencing L shoulder/ back spasms. Denies injury.

## 2024-11-07 NOTE — ED PROVIDER NOTES
[facility] EMERGENCY DEP  EMERGENCY DEPARTMENT ENCOUNTER       Pt Name: Effie Melchor  MRN: [id number]  Birthdate 1955  Date of evaluation: 11/7/2024  Provider: Loli Chavez MD   PCP: Kell Zepeda APRN - NP  Note Started: 9:18 AM EST 11/7/24     CHIEF COMPLAINT       Chief Complaint   Patient presents with    Shoulder Pain        HISTORY OF PRESENT ILLNESS: 1 or more elements      History From: patient, History limited by: none     Effie Melchor is a 69 y.o. female presents emergency department for left scapular pain.  Patient reports pain started yesterday.        Please See MDM for Additional Details of the HPI/PMH  Nursing Notes were all reviewed and agreed with or any disagreements were addressed in the HPI.     REVIEW OF SYSTEMS        Positives and Pertinent negatives as per HPI.    PAST HISTORY     Past Medical History:  Past Medical History:   Diagnosis Date    Arthritis     Breast cyst     Depressive disorder, not elsewhere classified 6/17/2013    Fibromyalgia 6/17/2013    Hypertension     Menopause     Mixed hyperlipidemia 6/17/2013    Sciatica     Sleep apnea     no CPAP    Stroke (HCC) 2021    no residuals per patient    Type II or unspecified type diabetes mellitus without mention of complication, not stated as uncontrolled 6/17/2013    Unspecified vitamin D deficiency 6/17/2013       Past Surgical History:  Past Surgical History:   Procedure Laterality Date    BACK SURGERY      decompression lower back per pt    BREAST BIOPSY Left     cyst removal    CHOLECYSTECTOMY      COLONOSCOPY  7/28/14    COLONOSCOPY N/A 7/8/2024    COLONOSCOPY performed by Zoraida Menon MD at [facility] ENDOSCOPY    EYE SURGERY Right 8/6/2024    RIGHT EYE PHACOEMULSIFICATION WITH INTRAOCULAR LENS IMPLANT WITH MALYUGIN RING(MAC W/RETROBULBAR) performed by Karyna Hurt MD at [facility] AMBULATORY OR    EYE SURGERY Left 8/20/2024    LEFT EYE SECOND EYE PHACOEMULSIFICATION WITH  INTRAOCULAR LENS IMPLANT  Given 11/7/24 0916)   cyclobenzaprine (FLEXERIL) tablet 10 mg (10 mg Oral Given 11/7/24 0916)       Medical Decision Making  Amount and/or Complexity of Data Reviewed  Radiology: ordered.    Risk  Prescription drug management.        69-year-old female with left scapular pain.  Pain started yesterday.  Worse with arm movement.  Patient reports she is having difficulty sitting up due to severity of pain with torso movement.  No shortness of breath, cough.  No weakness or numbness of extremities.  No anterior chest wall pain.    On exam, patient does not have zoster.  She has focal tenderness over her left trapezius and left superior scapular region.  Her lung sounds are clear.    Exam and history not suggestive of PE, acute coronary syndrome, or aortic dissection.  Patient reports she took her blood pressure medication immediately prior to arrival, notably hypertensive here.  Blood pressure may also be related to pain.  After discussion of pain medication options, patient voiced that she does not want opiate pain meds.  Will give trial of cyclobenzaprine and Toradol now.    Reassessment: Toradol and cyclobenzaprine, will help to improve pain.  Patient can continue to take her naproxen at home.  Will give prescription for cyclobenzaprine.  Also will also recommend stretching, heating pads, topical pain relief.  Plan for discharge home with strict return to ED precautions.    FINAL IMPRESSION     1. Pain of left scapula          DISPOSITION/PLAN   Effie Melchor's  results have been reviewed with her.  She has been counseled regarding her diagnosis, treatment, and plan.  She verbally conveys understanding and agreement of the signs, symptoms, diagnosis, treatment and prognosis and additionally agrees to follow up as discussed.  She also agrees with the care-plan and conveys that all of her questions have been answered.  I have also provided discharge instructions for her that include: educational information

## 2024-11-07 NOTE — DISCHARGE INSTRUCTIONS
Your blood pressure was elevated here.  Please make sure you take your regular blood pressure medications as prescribed.    Using a heating pad and trying to stretch the area that is hurting can often help loosen the muscles and get you feeling better.  Take your regular Aleve for pain.  You can take cyclobenzaprine 10 mg up to 3 times a day as a muscle relaxer.  Do not take methocarbamol and cyclobenzaprine at the same time.    Please go back to the emergency department immediately for weakness, numbness, worsening pain, or any new symptoms.

## 2025-01-16 ENCOUNTER — HOSPITAL ENCOUNTER (EMERGENCY)
Facility: HOSPITAL | Age: 70
Discharge: HOME | End: 2025-01-16
Attending: EMERGENCY MEDICINE
Payer: MEDICARE

## 2025-01-16 ENCOUNTER — APPOINTMENT (OUTPATIENT)
Facility: HOSPITAL | Age: 70
End: 2025-01-16
Payer: MEDICARE

## 2025-01-16 VITALS
OXYGEN SATURATION: 97 % | RESPIRATION RATE: 14 BRPM | HEART RATE: 98 BPM | WEIGHT: 143 LBS | TEMPERATURE: 98.7 F | HEIGHT: 61 IN | DIASTOLIC BLOOD PRESSURE: 102 MMHG | BODY MASS INDEX: 27 KG/M2 | SYSTOLIC BLOOD PRESSURE: 158 MMHG

## 2025-01-16 DIAGNOSIS — M79.671 PAIN OF MIDFOOT, RIGHT: Primary | ICD-10-CM

## 2025-01-16 PROCEDURE — 99283 EMERGENCY DEPT VISIT LOW MDM: CPT

## 2025-01-16 PROCEDURE — 73630 X-RAY EXAM OF FOOT: CPT

## 2025-01-16 RX ORDER — PREDNISONE 20 MG/1
TABLET ORAL
Qty: 11 TABLET | Refills: 0 | Status: SHIPPED | OUTPATIENT
Start: 2025-01-16

## 2025-01-16 ASSESSMENT — PAIN DESCRIPTION - ORIENTATION: ORIENTATION: RIGHT

## 2025-01-16 ASSESSMENT — PAIN DESCRIPTION - DESCRIPTORS: DESCRIPTORS: ACHING

## 2025-01-16 ASSESSMENT — PAIN SCALES - GENERAL: PAINLEVEL_OUTOF10: 10

## 2025-01-16 ASSESSMENT — PAIN DESCRIPTION - PAIN TYPE: TYPE: ACUTE PAIN

## 2025-01-16 ASSESSMENT — PAIN DESCRIPTION - LOCATION: LOCATION: FOOT

## 2025-01-16 ASSESSMENT — PAIN - FUNCTIONAL ASSESSMENT: PAIN_FUNCTIONAL_ASSESSMENT: 0-10

## 2025-01-16 NOTE — ED PROVIDER NOTES
diagnosis, and discharge instructions have been discussed, understanding conveyed, and agreed upon. The patient is to follow up as recommended or return to ER should their symptoms worsen.      PATIENT REFERRED TO:  Kell Zepeda APRN - NP  [address] [facility] [address] [phone]    Schedule an appointment as soon as possible for a visit          DISCHARGE MEDICATIONS:     Medication List        START taking these medications      predniSONE 20 MG tablet  Commonly known as: DELTASONE  40mg PO daily x 4 days then 20mg PO daily x 3 days.  Total 7 days.            ASK your doctor about these medications      acetaminophen 500 MG tablet  Commonly known as: TYLENOL     ALPRAZolam 1 MG tablet  Commonly known as: XANAX     amLODIPine 10 MG tablet  Commonly known as: NORVASC     aspirin 81 MG EC tablet     atorvastatin 40 MG tablet  Commonly known as: LIPITOR     Cholecalciferol 50 MCG (2000 UT) Tabs     DULOXETINE HCL PO     empagliflozin 10 MG tablet  Commonly known as: JARDIANCE     EPINEPHrine 0.3 MG/0.3ML Soaj injection  Commonly known as: EpiPen 2-Carlos  Inject 0.3 mLs into the muscle as needed (anaphylaxis) Use as directed for allergic reaction     gabapentin 300 MG capsule  Commonly known as: NEURONTIN     hydroCHLOROthiazide 12.5 MG capsule     lidocaine 5 %  Commonly known as: LIDODERM     losartan 100 MG tablet  Commonly known as: COZAAR     magnesium oxide 400 MG tablet  Commonly known as: MAG-OX  Take 1 tablet by mouth 2 times daily     Melatonin 10 MG Tabs     metFORMIN 1000 MG tablet  Commonly known as: GLUCOPHAGE     metoprolol tartrate 25 MG tablet  Commonly known as: LOPRESSOR     ondansetron 4 MG disintegrating tablet  Commonly known as: ZOFRAN-ODT  Take 1 tablet by mouth 3 times daily as needed for Nausea or Vomiting     pantoprazole 40 MG tablet  Commonly known as: PROTONIX     pioglitazone 15 MG tablet  Commonly known as: ACTOS     Plavix 75 MG tablet  Generic drug: clopidogrel

## 2025-01-16 NOTE — DISCHARGE INSTRUCTIONS
Take the steroid medication as prescribed.  Start with 40 mg for 4 days, then take 20 mg for 3 days.  This will be a total of 1 week of medication.    Follow-up with your doctor or a foot specialist about your foot pain.  Come back to the emergency department immediately for any new or worsening symptoms.

## 2025-03-12 ENCOUNTER — TRANSCRIBE ORDERS (OUTPATIENT)
Facility: HOSPITAL | Age: 70
End: 2025-03-12

## 2025-03-12 DIAGNOSIS — Z12.31 SCREENING MAMMOGRAM FOR BREAST CANCER: Primary | ICD-10-CM

## 2025-03-31 ENCOUNTER — HOSPITAL ENCOUNTER (OUTPATIENT)
Facility: HOSPITAL | Age: 70
Discharge: HOME | End: 2025-04-03
Payer: MEDICARE

## 2025-03-31 DIAGNOSIS — Z12.31 SCREENING MAMMOGRAM FOR BREAST CANCER: ICD-10-CM

## 2025-03-31 PROCEDURE — 77063 BREAST TOMOSYNTHESIS BI: CPT

## 2025-04-17 ENCOUNTER — HOSPITAL ENCOUNTER (OUTPATIENT)
Facility: HOSPITAL | Age: 70
Discharge: HOME | End: 2025-04-20
Payer: MEDICARE

## 2025-04-17 DIAGNOSIS — R92.8 ABNORMAL MAMMOGRAM OF RIGHT BREAST: ICD-10-CM

## 2025-04-17 PROCEDURE — G0279 TOMOSYNTHESIS, MAMMO: HCPCS

## 2025-06-21 ENCOUNTER — APPOINTMENT (OUTPATIENT)
Facility: HOSPITAL | Age: 70
End: 2025-06-21
Payer: MEDICARE

## 2025-06-21 ENCOUNTER — HOSPITAL ENCOUNTER (EMERGENCY)
Facility: HOSPITAL | Age: 70
Discharge: HOME | End: 2025-06-21
Attending: EMERGENCY MEDICINE
Payer: MEDICARE

## 2025-06-21 VITALS
HEART RATE: 73 BPM | RESPIRATION RATE: 16 BRPM | TEMPERATURE: 98.7 F | SYSTOLIC BLOOD PRESSURE: 180 MMHG | HEIGHT: 62 IN | DIASTOLIC BLOOD PRESSURE: 90 MMHG | OXYGEN SATURATION: 100 % | WEIGHT: 140 LBS | BODY MASS INDEX: 25.76 KG/M2

## 2025-06-21 DIAGNOSIS — M16.11 OSTEOARTHRITIS OF RIGHT HIP, UNSPECIFIED OSTEOARTHRITIS TYPE: ICD-10-CM

## 2025-06-21 DIAGNOSIS — M25.551 RIGHT HIP PAIN: Primary | ICD-10-CM

## 2025-06-21 PROCEDURE — 6370000000 HC RX 637 (ALT 250 FOR IP): Performed by: EMERGENCY MEDICINE

## 2025-06-21 PROCEDURE — 73502 X-RAY EXAM HIP UNI 2-3 VIEWS: CPT

## 2025-06-21 PROCEDURE — 99283 EMERGENCY DEPT VISIT LOW MDM: CPT

## 2025-06-21 RX ORDER — TRAMADOL HYDROCHLORIDE 50 MG/1
50 TABLET ORAL EVERY 6 HOURS PRN
Qty: 12 TABLET | Refills: 0 | Status: SHIPPED | OUTPATIENT
Start: 2025-06-21 | End: 2025-06-24

## 2025-06-21 RX ORDER — IBUPROFEN 400 MG/1
400 TABLET, FILM COATED ORAL
Status: COMPLETED | OUTPATIENT
Start: 2025-06-21 | End: 2025-06-21

## 2025-06-21 RX ORDER — TRAMADOL HYDROCHLORIDE 50 MG/1
50 TABLET ORAL
Status: COMPLETED | OUTPATIENT
Start: 2025-06-21 | End: 2025-06-21

## 2025-06-21 RX ADMIN — TRAMADOL HYDROCHLORIDE 50 MG: 50 TABLET ORAL at 11:50

## 2025-06-21 RX ADMIN — IBUPROFEN 400 MG: 400 TABLET, FILM COATED ORAL at 10:44

## 2025-06-21 ASSESSMENT — PAIN DESCRIPTION - LOCATION
LOCATION: HIP
LOCATION: HIP

## 2025-06-21 ASSESSMENT — PAIN SCALES - GENERAL
PAINLEVEL_OUTOF10: 8
PAINLEVEL_OUTOF10: 7
PAINLEVEL_OUTOF10: 7
PAINLEVEL_OUTOF10: 6

## 2025-06-21 ASSESSMENT — PAIN - FUNCTIONAL ASSESSMENT
PAIN_FUNCTIONAL_ASSESSMENT: 0-10
PAIN_FUNCTIONAL_ASSESSMENT: 0-10

## 2025-06-21 ASSESSMENT — PAIN DESCRIPTION - ORIENTATION
ORIENTATION: RIGHT
ORIENTATION: RIGHT

## 2025-06-21 NOTE — ED PROVIDER NOTES
[facility] EMERGENCY DEPARTMENT  EMERGENCY DEPARTMENT ENCOUNTER       Pt Name: Effie Melchor  MRN: [id number]  Birthdate 1955  Date of evaluation: 6/21/2025  Provider: Loli Chavez MD   PCP: Kell Zepeda APRN - NP  Note Started: 11:52 AM EDT 6/21/25     CHIEF COMPLAINT       Chief Complaint   Patient presents with    Hip Pain        HISTORY OF PRESENT ILLNESS: 1 or more elements      History From: patient, History limited by: none     Effie Melchor is a 69 y.o. female presents to ED complaining of right hip pain.  Patient reports severe right hip pain that radiates down her anterior right thigh.  Reports onset of symptoms worsening over the last 2 days.  No fever.  No fall.  Reports history of right hip osteoarthritis.       Please See MDM for Additional Details of the HPI/PMH  Nursing Notes were all reviewed and agreed with or any disagreements were addressed in the HPI.     REVIEW OF SYSTEMS        Positives and Pertinent negatives as per HPI.    PAST HISTORY     Past Medical History:  Past Medical History:   Diagnosis Date    Arthritis     Breast cyst     Depressive disorder, not elsewhere classified 6/17/2013    Fibromyalgia 6/17/2013    Hypertension     Menopause     Mixed hyperlipidemia 6/17/2013    Sciatica     Sleep apnea     no CPAP    Stroke (HCC) 2021    no residuals per patient    Type II or unspecified type diabetes mellitus without mention of complication, not stated as uncontrolled 6/17/2013    Unspecified vitamin D deficiency 6/17/2013       Past Surgical History:  Past Surgical History:   Procedure Laterality Date    BACK SURGERY      decompression lower back per pt    BREAST BIOPSY Left     cyst removal    CHOLECYSTECTOMY      COLONOSCOPY  7/28/14    COLONOSCOPY N/A 7/8/2024    COLONOSCOPY performed by Zoraida Menon MD at [facility] ENDOSCOPY    EYE SURGERY Right 8/6/2024    RIGHT EYE PHACOEMULSIFICATION WITH INTRAOCULAR LENS IMPLANT WITH MALYUGIN RING(MAC  IMPRESSION    Discharge Note: The patient is stable for discharge home. The signs, symptoms, diagnosis, and discharge instructions have been discussed, understanding conveyed, and agreed upon. The patient is to follow up as recommended or return to ER should their symptoms worsen.      PATIENT REFERRED TO:  Kell Zepeda APRN - NP  [address] [facility] [address] [phone]    Schedule an appointment as soon as possible for a visit          DISCHARGE MEDICATIONS:     Medication List        START taking these medications      traMADol 50 MG tablet  Commonly known as: Ultram  Take 1 tablet by mouth every 6 hours as needed for Pain for up to 3 days. Intended supply: 3 days. Take lowest dose possible to manage pain Max Daily Amount: 200 mg            ASK your doctor about these medications      acetaminophen 500 MG tablet  Commonly known as: TYLENOL     ALPRAZolam 1 MG tablet  Commonly known as: XANAX     amLODIPine 10 MG tablet  Commonly known as: NORVASC     aspirin 81 MG EC tablet     atorvastatin 40 MG tablet  Commonly known as: LIPITOR     Cholecalciferol 50 MCG (2000 UT) Tabs     DULOXETINE HCL PO     empagliflozin 10 MG tablet  Commonly known as: JARDIANCE     EPINEPHrine 0.3 MG/0.3ML Soaj injection  Commonly known as: EpiPen 2-Carlos  Inject 0.3 mLs into the muscle as needed (anaphylaxis) Use as directed for allergic reaction     gabapentin 300 MG capsule  Commonly known as: NEURONTIN     hydroCHLOROthiazide 12.5 MG capsule     lidocaine 5 %  Commonly known as: LIDODERM     losartan 100 MG tablet  Commonly known as: COZAAR     magnesium oxide 400 MG tablet  Commonly known as: MAG-OX  Take 1 tablet by mouth 2 times daily     Melatonin 10 MG Tabs     metFORMIN 1000 MG tablet  Commonly known as: GLUCOPHAGE     metoprolol tartrate 25 MG tablet  Commonly known as: LOPRESSOR     ondansetron 4 MG disintegrating tablet  Commonly known as: ZOFRAN-ODT  Take 1 tablet by mouth 3 times daily as needed for Nausea

## 2025-06-21 NOTE — ED NOTES
I have reviewed discharge instructions with the patient. The patient verbalized understanding. Discharge medications discussed with patient. No questions at this time.To car via wheelchair

## 2025-06-21 NOTE — ED NOTES
I have reviewed discharge instructions with the patient. The patient verbalized understanding. Discharge medications discussed with patient. No questions at this time. To car via wheelchair

## (undated) DEVICE — NEEDLE HYPO 30GA L0.5IN BGE POLYPR HUB S STL REG BVL STR

## (undated) DEVICE — SYRINGE MEDICAL 3ML CLEAR PLASTIC STANDARD NON CONTROL LUERLOCK TIP DISPOSABLE

## (undated) DEVICE — GLOVE SURG SZ 65 THK91MIL LTX FREE SYN POLYISOPRENE

## (undated) DEVICE — CUFF BLD PRSS AD CLTH SGL TB W/ BAYNT CONN ROUNDED CORNER

## (undated) DEVICE — SOLUTION IRRIG 500ML STRL H2O NONPYROGENIC

## (undated) DEVICE — ENDOSCOPIC KIT COMPLIANCE ENDOKIT

## (undated) DEVICE — SYRINGE MED 30ML STD CLR PLAS LUERLOCK TIP N CTRL DISP

## (undated) DEVICE — SYRINGE MED 10ML LUERLOCK TIP W/O SFTY DISP

## (undated) DEVICE — IV START KIT: Brand: MEDLINE

## (undated) DEVICE — Device: Brand: MALYUGIN RING SYSTEM 6.25MM

## (undated) DEVICE — AGENT VISCOELASTIC SODIUM HYALURONATE 10 MG/ML PROVISC

## (undated) DEVICE — SET GRAV CK VLV NEEDLESS ST 3 GANGED 4WAY STPCOCK HI FLO 10

## (undated) DEVICE — TIP SUCT TRNSPAR RIB SURF STD BLB RIG NVENT W/ 5IN1 CONN DYND50138] MEDLINE INDUSTRIES INC]

## (undated) DEVICE — SLIT FULL HDL-2.8MM ANG C-CUT: Brand: SHARPOINT

## (undated) DEVICE — CONTAINER SPEC 20 ML LID NEUT BUFF FORMALIN 10 % POLYPR STS

## (undated) DEVICE — PACK CATRCT CUST AS835752] ALCON LABORATORIES INC]

## (undated) DEVICE — ELECTRODE PT RET AD L9FT HI MOIST COND ADH HYDRGEL CORDED